# Patient Record
Sex: FEMALE | Race: WHITE | NOT HISPANIC OR LATINO | Employment: FULL TIME | URBAN - METROPOLITAN AREA
[De-identification: names, ages, dates, MRNs, and addresses within clinical notes are randomized per-mention and may not be internally consistent; named-entity substitution may affect disease eponyms.]

---

## 2017-03-14 ENCOUNTER — HOSPITAL ENCOUNTER (OUTPATIENT)
Facility: MEDICAL CENTER | Age: 62
End: 2017-03-14
Attending: FAMILY MEDICINE
Payer: COMMERCIAL

## 2017-03-14 ENCOUNTER — OFFICE VISIT (OUTPATIENT)
Dept: MEDICAL GROUP | Facility: PHYSICIAN GROUP | Age: 62
End: 2017-03-14
Payer: COMMERCIAL

## 2017-03-14 ENCOUNTER — HOSPITAL ENCOUNTER (OUTPATIENT)
Dept: RADIOLOGY | Facility: MEDICAL CENTER | Age: 62
End: 2017-03-14
Attending: FAMILY MEDICINE
Payer: COMMERCIAL

## 2017-03-14 ENCOUNTER — TELEPHONE (OUTPATIENT)
Dept: MEDICAL GROUP | Facility: PHYSICIAN GROUP | Age: 62
End: 2017-03-14

## 2017-03-14 VITALS
HEIGHT: 67 IN | BODY MASS INDEX: 33.12 KG/M2 | SYSTOLIC BLOOD PRESSURE: 160 MMHG | WEIGHT: 211 LBS | TEMPERATURE: 97.9 F | HEART RATE: 74 BPM | OXYGEN SATURATION: 98 % | DIASTOLIC BLOOD PRESSURE: 90 MMHG

## 2017-03-14 DIAGNOSIS — N30.01 ACUTE CYSTITIS WITH HEMATURIA: ICD-10-CM

## 2017-03-14 DIAGNOSIS — R30.0 BURNING WITH URINATION: ICD-10-CM

## 2017-03-14 DIAGNOSIS — R10.9 LEFT FLANK PAIN: ICD-10-CM

## 2017-03-14 PROBLEM — R19.7 ACUTE DIARRHEA: Status: ACTIVE | Noted: 2017-03-14

## 2017-03-14 PROBLEM — R19.7 ACUTE DIARRHEA: Status: RESOLVED | Noted: 2017-03-14 | Resolved: 2017-03-14

## 2017-03-14 PROCEDURE — 74176 CT ABD & PELVIS W/O CONTRAST: CPT

## 2017-03-14 PROCEDURE — 87186 SC STD MICRODIL/AGAR DIL: CPT

## 2017-03-14 PROCEDURE — 99214 OFFICE O/P EST MOD 30 MIN: CPT | Performed by: FAMILY MEDICINE

## 2017-03-14 PROCEDURE — 87086 URINE CULTURE/COLONY COUNT: CPT

## 2017-03-14 PROCEDURE — 87077 CULTURE AEROBIC IDENTIFY: CPT

## 2017-03-14 RX ORDER — CIPROFLOXACIN 500 MG/1
500 TABLET, FILM COATED ORAL 2 TIMES DAILY
Qty: 20 TAB | Refills: 0 | Status: SHIPPED | OUTPATIENT
Start: 2017-03-14 | End: 2017-03-16 | Stop reason: SINTOL

## 2017-03-14 NOTE — TELEPHONE ENCOUNTER
----- Message from Anca Barboza D.O. sent at 3/14/2017 10:49 AM PDT -----  Please call pt: CT was negative for a kidney stone.  Please take current antibiotic until it is completed.  If you have any further pain, please let me know.    Anca Barboza D.O.    #please note I have added a urine culture

## 2017-03-14 NOTE — MR AVS SNAPSHOT
"        Brenda Davidson   3/14/2017 8:20 AM   Office Visit   MRN: [id number]    Department:  [facility]   Dept Phone:  [phone]    Description:  Female : 1955   Provider:  Anca Barboza D.O.           Reason for Visit     Dysuria x2weeks    Diarrhea x2weeks      Allergies as of 3/14/2017     Allergen Noted Reactions    Codeine 2009   Rash, Itching    Vicodin doesn't cause her any problems    Ace Inhibitors 2013       cough      You were diagnosed with     Acute cystitis with hematuria   [115368]   Uncontrolled. Prescription for Cipro 500 mg one tab by mouth twice a day ×10 days. Monitor for tolerance and effect.    Burning with urination   [675534]   Uncontrolled; symptoms secondary to problem #1.    Left flank pain   [701920]   Uncontrolled. Given type of pain and associated urinary symptoms concerning for renal stones; sent for CT evaluation. Monitor for results      Vital Signs     Blood Pressure Pulse Temperature Height Weight Body Mass Index    160/90 mmHg 74 36.6 °C (97.9 °F) 1.702 m (5' 7\") 95.709 kg (211 lb) 33.04 kg/m2    Oxygen Saturation Smoking Status                98% Never Smoker           Basic Information     Date Of Birth Sex Race Ethnicity Preferred Language    1955 Female White Non- English      Your appointments     Mar 14, 2017 11:00 AM   CT BODY WO 30 with VISTA CT 1   [facility] [employer] ([employer])    [address] [place] [address] [phone]           Taking medications as regularly scheduled is strongly encouraged.            2017  4:00 PM   Established Patient with Anca Barboza D.O.   [facility] ([employer])    [address] [place] [address] [phone]           You will be receiving a confirmation call a few days before your appointment from our automated call confirmation system.              Problem List              ICD-10-CM Priority Class Noted - Resolved    Chronic low back pain M54.5, G89.29   " Unknown - Present    Type II or unspecified type diabetes mellitus without mention of complication, uncontrolled E11.65   10/27/2014 - Present    Dyslipidemia E78.5   6/22/2015 - Present    Malignant neoplasm of cecum (CMS-HCC) C18.0   8/21/2015 - Present    Right foot pain M79.671   3/1/2016 - Present    Type 2 diabetes mellitus without complication (CMS-HCC) E11.9   3/1/2016 - Present    Essential hypertension I10   3/1/2016 - Present    Burning with urination R30.0   12/2/2016 - Present    Left flank pain R10.9   3/14/2017 - Present      Health Maintenance        Date Due Completion Dates    DIABETES MONOFILAMENT / LE EXAM 1955 ---    IMM DTaP/Tdap/Td Vaccine (1 - Tdap) 5/14/1973 ---    IMM ZOSTER VACCINE 5/14/2014 ---    RETINAL SCREENING 10/13/2015 10/13/2014 (Done)    Override on 10/13/2014: Done    IMM PNEUMOCOCCAL 19-64 (ADULT) MEDIUM RISK SERIES (1 of 1 - PPSV23) 10/19/2015 8/24/2015    PAP SMEAR 2/4/2016 2/4/2013 (Done)    Override on 2/4/2013: Done    URINE ACR / MICROALBUMIN 3/10/2016 3/10/2015, 11/23/2013    MAMMOGRAM 6/29/2016 6/29/2015, 3/16/2012, 11/20/2008, 11/20/2008    IMM INFLUENZA (1) 9/1/2016 ---    A1C SCREENING 10/12/2016 4/12/2016, 6/22/2015, 3/10/2015, 10/25/2014, 11/23/2013    FASTING LIPID PROFILE 4/12/2017 4/12/2016, 6/22/2015, 3/10/2015, 10/25/2014, 11/23/2013    SERUM CREATININE 4/12/2017 4/12/2016, 8/25/2015, 8/23/2015, 8/22/2015, 8/18/2015, 6/22/2015, 3/10/2015, 10/25/2014, 11/23/2013, 4/6/2012    COLONOSCOPY 8/13/2025 8/13/2015            Current Immunizations     13-VALENT PCV PREVNAR 8/24/2015  9:36 AM      Below and/or attached are the medications your provider expects you to take. Review all of your home medications and newly ordered medications with your provider and/or pharmacist. Follow medication instructions as directed by your provider and/or pharmacist. Please keep your medication list with you and share with your provider. Update the information when medications  are discontinued, doses are changed, or new medications (including over-the-counter products) are added; and carry medication information at all times in the event of emergency situations     Allergies:  CODEINE - Rash,Itching     ACE INHIBITORS - (reactions not documented)               Medications  Valid as of: March 14, 2017 -  9:06 AM    Generic Name Brand Name Tablet Size Instructions for use    Atorvastatin Calcium (Tab) LIPITOR 40 MG Take 1 Tab by mouth every evening.        Blood Glucose Monitoring Suppl (Misc) Blood Glucose Monitoring Suppl SUPPLIES Test strips order: Test strips for Accu Check Compact meter. Sig: use once daily before breakfast and prn ssx high or low sugar #150 RF x 3        Ciprofloxacin HCl (Tab) CIPRO 500 MG Take 1 Tab by mouth 2 times a day.        DiltiaZEM HCl Coated Beads (CAPSULE SR 24 HR) CARTIA  MG TAKE ONE CAPSULE BY MOUTH DAILY        DiltiaZEM HCl Coated Beads (CAPSULE SR 24 HR) CARDIZEM  MG Take 1 Cap by mouth every day.        GlyBURIDE (Tab) DIABETA 5 MG TAKE ONE TABLET BY MOUTH EVERY MORNING WITH BREAKFAST(GENERIC DIABETA)        Ibuprofen (Tab) MOTRIN 800 MG Take 1 Tab by mouth every 8 hours as needed.        Losartan Potassium (Tab) COZAAR 100 MG Take 1 Tab by mouth every day.        MetFORMIN HCl (Tab) GLUCOPHAGE 1000 MG Take 1 Tab by mouth 2 times a day, with meals.        .                 Medicines prescribed today were sent to:     [facility][id number] - [place] - [address] [place] [address]    Phone: [phone] Fax: [phone]    Open 24 Hours?: No      Medication refill instructions:       If your prescription bottle indicates you have medication refills left, it is not necessary to call your provider’s office. Please contact your pharmacy and they will refill your medication.    If your prescription bottle indicates you do not have any refills left, you may request refills at any time through one of the following ways: The  online [employer] system (except Urgent Care), by calling your provider’s office, or by asking your pharmacy to contact your provider’s office with a refill request. Medication refills are processed only during regular business hours and may not be available until the next business day. Your provider may request additional information or to have a follow-up visit with you prior to refilling your medication.   *Please Note: Medication refills are assigned a new Rx number when refilled electronically. Your pharmacy may indicate that no refills were authorized even though a new prescription for the same medication is available at the pharmacy. Please request the medicine by name with the pharmacy before contacting your provider for a refill.        Your To Do List     Future Labs/Procedures Complete By Expires    CT-RENAL COLIC EVALUATION(A/P W/O)  As directed 3/14/2018         [employer] Access Code: Activation code not generated  Current [employer] Status: Active

## 2017-03-14 NOTE — ASSESSMENT & PLAN NOTE
New problem. Patient is reporting left flank pain is from going on for about 2 weeks. She states that she did have intermittent pain prior to that for the last 3 months but over the last 2 weeks it is now become persistent. She has pain that is in the upper outer part of the left flank, wraps around the iliac crest and shoots down into her groin. Patient reports that this pain is persistent throughout the day; is present at night to the point she cannot lie down on her left side. Along with that she's had other urinary symptoms including burning with urination. She reports that she has had some fevers and chills but no nausea and vomiting.

## 2017-03-14 NOTE — ASSESSMENT & PLAN NOTE
New problem. Patient is here today reporting burning with urination been present for approximately 2 weeks. Patient states it occurs every time that she urinates. She denies any blood in the urine. She reports that she has had some nausea but no vomiting; occasional fevers and chills. This is been associated with left flank pain.

## 2017-03-14 NOTE — PROGRESS NOTES
Subjective:   Brenda Davidson is a 61 y.o. female here today for flank pain and burning with urination    Left flank pain  New problem. Patient is reporting left flank pain is from going on for about 2 weeks. She states that she did have intermittent pain prior to that for the last 3 months but over the last 2 weeks it is now become persistent. She has pain that is in the upper outer part of the left flank, wraps around the iliac crest and shoots down into her groin. Patient reports that this pain is persistent throughout the day; is present at night to the point she cannot lie down on her left side. Along with that she's had other urinary symptoms including burning with urination. She reports that she has had some fevers and chills but no nausea and vomiting.    Burning with urination  New problem. Patient is here today reporting burning with urination been present for approximately 2 weeks. Patient states it occurs every time that she urinates. She denies any blood in the urine. She reports that she has had some nausea but no vomiting; occasional fevers and chills. This is been associated with left flank pain.           Current medicines (including changes today)  Current Outpatient Prescriptions   Medication Sig Dispense Refill   • ciprofloxacin (CIPRO) 500 MG Tab Take 1 Tab by mouth 2 times a day. 20 Tab 0   • metformin (GLUCOPHAGE) 1000 MG tablet Take 1 Tab by mouth 2 times a day, with meals. 180 Tab 3   • losartan (COZAAR) 100 MG Tab Take 1 Tab by mouth every day. 90 Tab 3   • glyBURIDE (DIABETA) 5 MG Tab TAKE ONE TABLET BY MOUTH EVERY MORNING WITH BREAKFAST(GENERIC DIABETA) 90 Tab 3   • atorvastatin (LIPITOR) 40 MG Tab Take 1 Tab by mouth every evening. 90 Tab 3   • diltiazem CD (CARTIA XT) 180 MG CAPSULE SR 24 HR Take 1 Cap by mouth every day. 90 Cap 3   • CARTIA  MG CAPSULE SR 24 HR TAKE ONE CAPSULE BY MOUTH DAILY 90 Cap 0   • ibuprofen (MOTRIN) 800 MG Tab Take 1 Tab by mouth every 8 hours as  "needed. 30 Tab 1   • Blood Glucose Monitoring Suppl SUPPLIES MISC Test strips order: Test strips for Accu Check Compact meter. Sig: use once daily before breakfast and prn ssx high or low sugar #150 RF x 3 150 Strip 5     No current facility-administered medications for this visit.     She  has a past medical history of Hypercholesteremia; Hypertension; Cold (3/29/12); Chronic low back pain; Other specified disorder of intestines; Dental disorder; Anesthesia; DM (diabetes mellitus) (CMS-HCC); and Mass of cecum (9/15). She also has no past medical history of Breast cancer (CMS-HCC).    ROS   No chest pain, no shortness of breath, no abdominal pain  +flank pain and burning with urination     Objective:     Blood pressure 160/90, pulse 74, temperature 36.6 °C (97.9 °F), height 1.702 m (5' 7\"), weight 95.709 kg (211 lb), SpO2 98 %. Body mass index is 33.04 kg/(m^2).   Physical Exam:  Alert, oriented in no acute distress.  Eye contact is good, speech goal directed, affect calm  HEENT: conjunctiva non-injected, sclera non-icteric.  Pinna normal. TM pearly gray.   Oral mucous membranes pink and moist with no lesions.  Neck No adenopathy or masses in the neck or supraclavicular regions.  Lungs: clear to auscultation bilaterally with good excursion.  CV: regular rate and rhythm.  Abdomen: soft; left flank pain on palpation with radiation over the iliac crest; suprapubic pain  Ext: no edema, color normal, vascularity normal, temperature normal        Assessment and Plan:   The following treatment plan was discussed     1. Acute cystitis with hematuria      Uncontrolled. Prescription for Cipro 500 mg one tab by mouth twice a day ×10 days. Monitor for tolerance and effect.   2. Burning with urination      Uncontrolled; symptoms secondary to problem #1.   3. Left flank pain  CT-RENAL COLIC EVALUATION(A/P W/O)    Uncontrolled. Given type of pain and associated urinary symptoms concerning for renal stones; sent for CT evaluation. " Monitor for results       Followup: Return if symptoms worsen or fail to improve.

## 2017-03-16 ENCOUNTER — TELEPHONE (OUTPATIENT)
Dept: MEDICAL GROUP | Facility: PHYSICIAN GROUP | Age: 62
End: 2017-03-16

## 2017-03-16 LAB
BACTERIA UR CULT: ABNORMAL
SIGNIFICANT IND 70042: ABNORMAL
SITE SITE: ABNORMAL
SOURCE SOURCE: ABNORMAL

## 2017-03-16 RX ORDER — LEVOFLOXACIN 250 MG/1
250 TABLET, FILM COATED ORAL DAILY
Qty: 5 TAB | Refills: 0 | Status: SHIPPED | OUTPATIENT
Start: 2017-03-16 | End: 2017-03-21

## 2017-03-16 NOTE — TELEPHONE ENCOUNTER
----- Message from Anca Barboza D.O. sent at 3/16/2017 12:46 PM PDT -----  Please call the pt: the urine culture was positive for infection.  Please ask if she is doing better on the antibiotic.    Anca Barboza D.O.

## 2017-03-16 NOTE — TELEPHONE ENCOUNTER
Pt said that this medication has started to make her vomit after she's taking it. I advised her to stop taking the medication and we will send in a new one.

## 2017-05-25 ENCOUNTER — OFFICE VISIT (OUTPATIENT)
Dept: MEDICAL GROUP | Facility: PHYSICIAN GROUP | Age: 62
End: 2017-05-25
Payer: COMMERCIAL

## 2017-05-25 VITALS
SYSTOLIC BLOOD PRESSURE: 136 MMHG | BODY MASS INDEX: 32.96 KG/M2 | DIASTOLIC BLOOD PRESSURE: 78 MMHG | HEART RATE: 87 BPM | TEMPERATURE: 98 F | HEIGHT: 67 IN | WEIGHT: 210 LBS | OXYGEN SATURATION: 94 %

## 2017-05-25 DIAGNOSIS — K57.32 DIVERTICULITIS OF LARGE INTESTINE WITHOUT PERFORATION OR ABSCESS WITHOUT BLEEDING: ICD-10-CM

## 2017-05-25 DIAGNOSIS — R19.7 DIARRHEA OF PRESUMED INFECTIOUS ORIGIN: ICD-10-CM

## 2017-05-25 PROCEDURE — 99214 OFFICE O/P EST MOD 30 MIN: CPT | Performed by: FAMILY MEDICINE

## 2017-05-25 RX ORDER — METRONIDAZOLE 500 MG/1
500 TABLET ORAL 2 TIMES DAILY
Qty: 20 TAB | Refills: 0 | Status: SHIPPED | OUTPATIENT
Start: 2017-05-25 | End: 2017-06-04

## 2017-05-25 ASSESSMENT — PATIENT HEALTH QUESTIONNAIRE - PHQ9: CLINICAL INTERPRETATION OF PHQ2 SCORE: 0

## 2017-05-25 NOTE — ASSESSMENT & PLAN NOTE
New problem: multiple episodes of diarrhea that occur everyday for the past 2 months; has had associated LLQ pain. Patient reports that she has also had some fevers and chills on occasion. She denies any blood in the stool; she denies any significant foul odor associated with the stool. She reports that she has had some associated nausea but no vomiting and is still able to eat and drink without issue. She states that she had a colonoscopy 2 months ago which did show diverticulosis but no other issues.

## 2017-05-25 NOTE — MR AVS SNAPSHOT
"        Brenda Davidson   2017 3:40 PM   Office Visit   MRN: [id number]    Department:  [facility]   Dept Phone:  [phone]    Description:  Female : 1955   Provider:  Anca Barboza D.O.           Reason for Visit     LLQ Pain i3mybdxy. frequent BM, hair loss      Allergies as of 2017     Allergen Noted Reactions    Codeine 2009   Rash, Itching    Vicodin doesn't cause her any problems    Ace Inhibitors 2013       cough      You were diagnosed with     Diverticulitis of large intestine without perforation or abscess without bleeding   [335164]   Uncontrolled. Prescription for Flagyl 500 mg one tab by mouth twice a day ×10 days. Monitor for tolerance and effect    Diarrhea of presumed infectious origin   [009.3.ICD-9-CM]   Uncontrolled; could be symptoms secondary to problem #1. Given longevity will also send for stool studies for further evaluation; monitor for results      Vital Signs     Blood Pressure Pulse Temperature Height Weight Body Mass Index    136/78 mmHg 87 36.7 °C (98 °F) 1.702 m (5' 7\") 95.255 kg (210 lb) 32.88 kg/m2    Oxygen Saturation Smoking Status                94% Never Smoker           Basic Information     Date Of Birth Sex Race Ethnicity Preferred Language    1955 Female White Non- English      Your appointments     2017  1:00 PM   NEW TO YOU with Kiah Perdue M.D.   [facility] [place] (--)    [address]  [address]  [facility] [address] [phone]              Problem List              ICD-10-CM Priority Class Noted - Resolved    Chronic low back pain M54.5, G89.29   Unknown - Present    Type II or unspecified type diabetes mellitus without mention of complication, uncontrolled E11.65   10/27/2014 - Present    Dyslipidemia E78.5   2015 - Present    Malignant neoplasm of cecum (CMS-HCC) C18.0   2015 - Present    Right foot pain M79.671   3/1/2016 - Present    Type 2 diabetes mellitus without " complication (CMS-HCC) E11.9   3/1/2016 - Present    Essential hypertension I10   3/1/2016 - Present    Burning with urination R30.0   12/2/2016 - Present    Left flank pain R10.9   3/14/2017 - Present    Diarrhea of presumed infectious origin A09   5/25/2017 - Present      Health Maintenance        Date Due Completion Dates    DIABETES MONOFILAMENT / LE EXAM 1955 ---    IMM DTaP/Tdap/Td Vaccine (1 - Tdap) 5/14/1973 ---    IMM ZOSTER VACCINE 5/14/2014 ---    RETINAL SCREENING 10/13/2015 10/13/2014 (Done)    Override on 10/13/2014: Done    IMM PNEUMOCOCCAL 19-64 (ADULT) MEDIUM RISK SERIES (1 of 1 - PPSV23) 10/19/2015 8/24/2015    PAP SMEAR 2/4/2016 2/4/2013 (Done)    Override on 2/4/2013: Done    URINE ACR / MICROALBUMIN 3/10/2016 3/10/2015, 11/23/2013    MAMMOGRAM 6/29/2016 6/29/2015, 3/16/2012, 11/20/2008, 11/20/2008    A1C SCREENING 10/12/2016 4/12/2016, 6/22/2015, 3/10/2015, 10/25/2014, 11/23/2013    FASTING LIPID PROFILE 4/12/2017 4/12/2016, 6/22/2015, 3/10/2015, 10/25/2014, 11/23/2013    SERUM CREATININE 4/12/2017 4/12/2016, 8/25/2015, 8/23/2015, 8/22/2015, 8/18/2015, 6/22/2015, 3/10/2015, 10/25/2014, 11/23/2013, 4/6/2012    COLONOSCOPY 8/13/2025 8/13/2015            Current Immunizations     13-VALENT PCV PREVNAR 8/24/2015  9:36 AM      Below and/or attached are the medications your provider expects you to take. Review all of your home medications and newly ordered medications with your provider and/or pharmacist. Follow medication instructions as directed by your provider and/or pharmacist. Please keep your medication list with you and share with your provider. Update the information when medications are discontinued, doses are changed, or new medications (including over-the-counter products) are added; and carry medication information at all times in the event of emergency situations     Allergies:  CODEINE - Rash,Itching     ACE INHIBITORS - (reactions not documented)               Medications  Valid  as of: May 25, 2017 -  4:04 PM    Generic Name Brand Name Tablet Size Instructions for use    Atorvastatin Calcium (Tab) LIPITOR 40 MG Take 1 Tab by mouth every evening.        Blood Glucose Monitoring Suppl (Misc) Blood Glucose Monitoring Suppl SUPPLIES Test strips order: Test strips for Accu Check Compact meter. Sig: use once daily before breakfast and prn ssx high or low sugar #150 RF x 3        DilTIAZem HCl Coated Beads (CAPSULE SR 24 HR) CARTIA  MG TAKE ONE CAPSULE BY MOUTH DAILY        DilTIAZem HCl Coated Beads (CAPSULE SR 24 HR) CARDIZEM  MG Take 1 Cap by mouth every day.        GlyBURIDE (Tab) DIABETA 5 MG TAKE ONE TABLET BY MOUTH EVERY MORNING WITH BREAKFAST(GENERIC DIABETA)        Ibuprofen (Tab) MOTRIN 800 MG Take 1 Tab by mouth every 8 hours as needed.        Losartan Potassium (Tab) COZAAR 100 MG Take 1 Tab by mouth every day.        MetFORMIN HCl (Tab) GLUCOPHAGE 1000 MG Take 1 Tab by mouth 2 times a day, with meals.        MetroNIDAZOLE (Tab) FLAGYL 500 MG Take 1 Tab by mouth 2 Times a Day for 10 days.        .                 Medicines prescribed today were sent to:     [facility][id number] - [place] [address]    Phone: [phone] Fax: [phone]    Open 24 Hours?: No      Medication refill instructions:       If your prescription bottle indicates you have medication refills left, it is not necessary to call your provider’s office. Please contact your pharmacy and they will refill your medication.    If your prescription bottle indicates you do not have any refills left, you may request refills at any time through one of the following ways: The online [employer] system (except Urgent Care), by calling your provider’s office, or by asking your pharmacy to contact your provider’s office with a refill request. Medication refills are processed only during regular business hours and may not be available until the next business day. Your provider may  request additional information or to have a follow-up visit with you prior to refilling your medication.   *Please Note: Medication refills are assigned a new Rx number when refilled electronically. Your pharmacy may indicate that no refills were authorized even though a new prescription for the same medication is available at the pharmacy. Please request the medicine by name with the pharmacy before contacting your provider for a refill.        Your To Do List     Future Labs/Procedures Complete By Expires    CDIFF BY PCR  As directed 5/26/2018    COMPLETE O&P  As directed 5/26/2018    CULTURE STOOL  As directed 5/26/2018    STOOL WBC'S  As directed 5/26/2018         MyChart Access Code: Activation code not generated  Current [employer] Status: Active

## 2017-05-25 NOTE — PROGRESS NOTES
Subjective:   Brenda Davidson is a 62 y.o. female here today for diarrhea    Diarrhea of presumed infectious origin  New problem: multiple episodes of diarrhea that occur everyday for the past 2 months; has had associated LLQ pain. Patient reports that she has also had some fevers and chills on occasion. She denies any blood in the stool; she denies any significant foul odor associated with the stool. She reports that she has had some associated nausea but no vomiting and is still able to eat and drink without issue. She states that she had a colonoscopy 2 months ago which did show diverticulosis but no other issues.         Current medicines (including changes today)  Current Outpatient Prescriptions   Medication Sig Dispense Refill   • metronidazole (FLAGYL) 500 MG Tab Take 1 Tab by mouth 2 Times a Day for 10 days. 20 Tab 0   • metformin (GLUCOPHAGE) 1000 MG tablet Take 1 Tab by mouth 2 times a day, with meals. 180 Tab 3   • losartan (COZAAR) 100 MG Tab Take 1 Tab by mouth every day. 90 Tab 3   • glyBURIDE (DIABETA) 5 MG Tab TAKE ONE TABLET BY MOUTH EVERY MORNING WITH BREAKFAST(GENERIC DIABETA) 90 Tab 3   • atorvastatin (LIPITOR) 40 MG Tab Take 1 Tab by mouth every evening. 90 Tab 3   • diltiazem CD (CARTIA XT) 180 MG CAPSULE SR 24 HR Take 1 Cap by mouth every day. 90 Cap 3   • CARTIA  MG CAPSULE SR 24 HR TAKE ONE CAPSULE BY MOUTH DAILY 90 Cap 0   • ibuprofen (MOTRIN) 800 MG Tab Take 1 Tab by mouth every 8 hours as needed. 30 Tab 1   • Blood Glucose Monitoring Suppl SUPPLIES MISC Test strips order: Test strips for Accu Check Compact meter. Sig: use once daily before breakfast and prn ssx high or low sugar #150 RF x 3 150 Strip 5     No current facility-administered medications for this visit.     She  has a past medical history of Hypercholesteremia; Hypertension; Cold (3/29/12); Chronic low back pain; Other specified disorder of intestines; Dental disorder; Anesthesia; DM (diabetes mellitus) (CMS-[facility]);  "and Mass of cecum (9/15). She also has no past medical history of Breast cancer (CMS-HCC).    ROS   No chest pain, no shortness of breath  +diarrhea     Objective:     Blood pressure 136/78, pulse 87, temperature 36.7 °C (98 °F), height 1.702 m (5' 7\"), weight 95.255 kg (210 lb), SpO2 94 %. Body mass index is 32.88 kg/(m^2).   Physical Exam:  Alert, oriented in no acute distress.  Eye contact is good, speech goal directed, affect calm  HEENT: conjunctiva non-injected, sclera non-icteric.  Pinna normal. Oral mucous membranes pink and moist with no lesions.  Neck No adenopathy or masses in the neck or supraclavicular regions.  Lungs: clear to auscultation bilaterally with good excursion.  CV: regular rate and rhythm.  Abdomen: soft, mild TTP along LLQ; no guarding  Ext: no edema, color normal, vascularity normal, temperature normal        Assessment and Plan:   The following treatment plan was discussed     1. Diverticulitis of large intestine without perforation or abscess without bleeding      Uncontrolled. Prescription for Flagyl 500 mg one tab by mouth twice a day ×10 days. Monitor for tolerance and effect   2. Diarrhea of presumed infectious origin  STOOL WBC'S    CDIFF BY PCR    COMPLETE O&P    CULTURE STOOL    Uncontrolled; could be symptoms secondary to problem #1. Given longevity will also send for stool studies for further evaluation; monitor for results       Followup: Return if symptoms worsen or fail to improve.            "

## 2017-05-30 ENCOUNTER — TELEPHONE (OUTPATIENT)
Dept: MEDICAL GROUP | Facility: PHYSICIAN GROUP | Age: 62
End: 2017-05-30

## 2017-05-30 RX ORDER — ONDANSETRON 4 MG/1
4 TABLET, FILM COATED ORAL EVERY 4 HOURS PRN
Qty: 60 TAB | Refills: 0 | Status: SHIPPED | OUTPATIENT
Start: 2017-05-30 | End: 2017-09-22

## 2017-05-30 NOTE — TELEPHONE ENCOUNTER
There is no other good option to treat this condition.  She should take with food and I will send in antinausea medication to help.    Anca Barboza D.O.

## 2017-05-30 NOTE — TELEPHONE ENCOUNTER
1. Caller Name: Brenda Davidson                                           Call Back Number: [phone] (home)         Patient approves a detailed voicemail message: N\A    Pt called stating the flagyl makes her nauseas and she would like a new antibiotic

## 2017-06-10 ENCOUNTER — HOSPITAL ENCOUNTER (OUTPATIENT)
Facility: MEDICAL CENTER | Age: 62
End: 2017-06-10
Attending: FAMILY MEDICINE
Payer: COMMERCIAL

## 2017-06-10 DIAGNOSIS — R19.7 DIARRHEA OF PRESUMED INFECTIOUS ORIGIN: ICD-10-CM

## 2017-06-10 LAB
C DIFF DNA SPEC QL NAA+PROBE: NEGATIVE
C DIFF TOX A+B STL QL IA: NEGATIVE
C DIFF TOX GENS STL QL NAA+PROBE: ABNORMAL
G LAMBLIA+C PARVUM AG STL QL RAPID: NORMAL
SIGNIFICANT IND 70042: NORMAL
SITE SITE: NORMAL
SOURCE SOURCE: NORMAL
WBC STL QL MICRO: NORMAL

## 2017-06-10 PROCEDURE — 87329 GIARDIA AG IA: CPT

## 2017-06-10 PROCEDURE — 87324 CLOSTRIDIUM AG IA: CPT

## 2017-06-10 PROCEDURE — 87328 CRYPTOSPORIDIUM AG IA: CPT

## 2017-06-10 PROCEDURE — 87899 AGENT NOS ASSAY W/OPTIC: CPT

## 2017-06-10 PROCEDURE — 87493 C DIFF AMPLIFIED PROBE: CPT

## 2017-06-10 PROCEDURE — 87045 FECES CULTURE AEROBIC BACT: CPT

## 2017-06-10 PROCEDURE — 89055 LEUKOCYTE ASSESSMENT FECAL: CPT

## 2017-06-10 PROCEDURE — 87046 STOOL CULTR AEROBIC BACT EA: CPT

## 2017-06-11 DIAGNOSIS — A04.72 C. DIFFICILE DIARRHEA: ICD-10-CM

## 2017-06-11 LAB
E COLI SXT1+2 STL IA: NORMAL
SIGNIFICANT IND 70042: NORMAL
SITE SITE: NORMAL
SOURCE SOURCE: NORMAL

## 2017-06-11 RX ORDER — METRONIDAZOLE 500 MG/1
500 TABLET ORAL 3 TIMES DAILY
Qty: 42 TAB | Refills: 0 | Status: SHIPPED | OUTPATIENT
Start: 2017-06-11 | End: 2017-07-18

## 2017-06-12 ENCOUNTER — TELEPHONE (OUTPATIENT)
Dept: MEDICAL GROUP | Facility: PHYSICIAN GROUP | Age: 62
End: 2017-06-12

## 2017-06-12 NOTE — TELEPHONE ENCOUNTER
Serenity at the lab called me with positive c diff resulta for Brenda    POS-SeeToxn (A)    Please call patient

## 2017-06-12 NOTE — TELEPHONE ENCOUNTER
----- Message from FILI Montes sent at 6/11/2017  4:07 PM PDT -----  Please advise patient that her stool studies came back positive for an infection called C. Diff. I realize she was previously prescribed Flagyl for diverticulitis and had some stomach upset with it. Please clarify if she took this whole 10 days or not.     I have sent in a new prescription for Flagyl 500 mg every 8 hours for 14 days rather than 2 times daily. I see she already got plenty of anti-nausea medication.     She will need a follow up stool study to confirm resolution 1 week after completing antibiotics. This order has been placed already.    FILI Montes  Covering for Dr. Barboza

## 2017-06-13 LAB
BACTERIA STL CULT: NORMAL
E COLI SXT1+2 STL IA: NORMAL
SIGNIFICANT IND 70042: NORMAL
SITE SITE: NORMAL
SOURCE SOURCE: NORMAL

## 2017-07-05 ENCOUNTER — TELEPHONE (OUTPATIENT)
Dept: MEDICAL GROUP | Facility: PHYSICIAN GROUP | Age: 62
End: 2017-07-05

## 2017-07-05 NOTE — TELEPHONE ENCOUNTER
There is an order already placed by Keyon Chauhan in chart.She needs to come to the lab and  the collection kit.    Rivera Zamorano M.D.  Covering for

## 2017-07-05 NOTE — TELEPHONE ENCOUNTER
1. Caller Name: Brenda Davidson                                           Call Back Number: [phone] (home)         Patient approves a detailed voicemail message: N\A    Pt called asking if she can just get a stool kit for her cdiff recheck instead of coming in for an appt on Friday

## 2017-07-07 ENCOUNTER — APPOINTMENT (OUTPATIENT)
Dept: MEDICAL GROUP | Facility: PHYSICIAN GROUP | Age: 62
End: 2017-07-07
Payer: COMMERCIAL

## 2017-07-15 ENCOUNTER — HOSPITAL ENCOUNTER (OUTPATIENT)
Facility: MEDICAL CENTER | Age: 62
End: 2017-07-15
Attending: NURSE PRACTITIONER
Payer: COMMERCIAL

## 2017-07-15 DIAGNOSIS — A04.72 C. DIFFICILE DIARRHEA: ICD-10-CM

## 2017-07-15 LAB
C DIFF DNA SPEC QL NAA+PROBE: NEGATIVE
C DIFF TOX A+B STL QL IA: NEGATIVE
C DIFF TOX GENS STL QL NAA+PROBE: ABNORMAL

## 2017-07-15 PROCEDURE — 87493 C DIFF AMPLIFIED PROBE: CPT

## 2017-07-15 PROCEDURE — 87324 CLOSTRIDIUM AG IA: CPT

## 2017-07-17 ENCOUNTER — TELEPHONE (OUTPATIENT)
Dept: MEDICAL GROUP | Facility: PHYSICIAN GROUP | Age: 62
End: 2017-07-17

## 2017-07-17 NOTE — TELEPHONE ENCOUNTER
1. Caller Name: Brenda Davidson                                         Call Back Number: [phone] (home)       Patient approves a detailed voicemail message: N\A    2. Patient is requesting lab results dated: 7/15/17    3. Confirmed results are in chart. Patient advised they will be contacted once interpreted by provider. Patient states she has been on antibiotic for 2 weeks

## 2017-07-18 ENCOUNTER — OFFICE VISIT (OUTPATIENT)
Dept: MEDICAL GROUP | Facility: PHYSICIAN GROUP | Age: 62
End: 2017-07-18
Payer: COMMERCIAL

## 2017-07-18 ENCOUNTER — HOSPITAL ENCOUNTER (OUTPATIENT)
Dept: LAB | Facility: MEDICAL CENTER | Age: 62
End: 2017-07-18
Attending: NURSE PRACTITIONER
Payer: COMMERCIAL

## 2017-07-18 VITALS
OXYGEN SATURATION: 97 % | DIASTOLIC BLOOD PRESSURE: 70 MMHG | HEIGHT: 67 IN | WEIGHT: 207 LBS | TEMPERATURE: 98.6 F | SYSTOLIC BLOOD PRESSURE: 138 MMHG | BODY MASS INDEX: 32.49 KG/M2 | HEART RATE: 77 BPM

## 2017-07-18 DIAGNOSIS — A04.72 C. DIFFICILE DIARRHEA: ICD-10-CM

## 2017-07-18 DIAGNOSIS — R10.32 LLQ ABDOMINAL PAIN: ICD-10-CM

## 2017-07-18 PROBLEM — R10.9 LEFT FLANK PAIN: Status: RESOLVED | Noted: 2017-03-14 | Resolved: 2017-07-18

## 2017-07-18 LAB
ALBUMIN SERPL BCP-MCNC: 4.3 G/DL (ref 3.2–4.9)
ALBUMIN/GLOB SERPL: 1.3 G/DL
ALP SERPL-CCNC: 48 U/L (ref 30–99)
ALT SERPL-CCNC: 57 U/L (ref 2–50)
ANION GAP SERPL CALC-SCNC: 12 MMOL/L (ref 0–11.9)
AST SERPL-CCNC: 38 U/L (ref 12–45)
BASOPHILS # BLD AUTO: 0.9 % (ref 0–1.8)
BASOPHILS # BLD: 0.06 K/UL (ref 0–0.12)
BILIRUB SERPL-MCNC: 0.4 MG/DL (ref 0.1–1.5)
BUN SERPL-MCNC: 25 MG/DL (ref 8–22)
CALCIUM SERPL-MCNC: 9.8 MG/DL (ref 8.5–10.5)
CHLORIDE SERPL-SCNC: 108 MMOL/L (ref 96–112)
CO2 SERPL-SCNC: 19 MMOL/L (ref 20–33)
CREAT SERPL-MCNC: 1.07 MG/DL (ref 0.5–1.4)
EOSINOPHIL # BLD AUTO: 0.14 K/UL (ref 0–0.51)
EOSINOPHIL NFR BLD: 2.1 % (ref 0–6.9)
ERYTHROCYTE [DISTWIDTH] IN BLOOD BY AUTOMATED COUNT: 48.7 FL (ref 35.9–50)
GFR SERPL CREATININE-BSD FRML MDRD: 52 ML/MIN/1.73 M 2
GLOBULIN SER CALC-MCNC: 3.3 G/DL (ref 1.9–3.5)
GLUCOSE SERPL-MCNC: 194 MG/DL (ref 65–99)
HCT VFR BLD AUTO: 39.3 % (ref 37–47)
HGB BLD-MCNC: 12.5 G/DL (ref 12–16)
IMM GRANULOCYTES # BLD AUTO: 0.01 K/UL (ref 0–0.11)
IMM GRANULOCYTES NFR BLD AUTO: 0.1 % (ref 0–0.9)
LYMPHOCYTES # BLD AUTO: 3.02 K/UL (ref 1–4.8)
LYMPHOCYTES NFR BLD: 44.6 % (ref 22–41)
MCH RBC QN AUTO: 28.7 PG (ref 27–33)
MCHC RBC AUTO-ENTMCNC: 31.8 G/DL (ref 33.6–35)
MCV RBC AUTO: 90.1 FL (ref 81.4–97.8)
MONOCYTES # BLD AUTO: 0.58 K/UL (ref 0–0.85)
MONOCYTES NFR BLD AUTO: 8.6 % (ref 0–13.4)
NEUTROPHILS # BLD AUTO: 2.96 K/UL (ref 2–7.15)
NEUTROPHILS NFR BLD: 43.7 % (ref 44–72)
NRBC # BLD AUTO: 0 K/UL
NRBC BLD AUTO-RTO: 0 /100 WBC
PLATELET # BLD AUTO: 301 K/UL (ref 164–446)
PMV BLD AUTO: 10.9 FL (ref 9–12.9)
POTASSIUM SERPL-SCNC: 3.7 MMOL/L (ref 3.6–5.5)
PROT SERPL-MCNC: 7.6 G/DL (ref 6–8.2)
RBC # BLD AUTO: 4.36 M/UL (ref 4.2–5.4)
SODIUM SERPL-SCNC: 139 MMOL/L (ref 135–145)
WBC # BLD AUTO: 6.8 K/UL (ref 4.8–10.8)

## 2017-07-18 PROCEDURE — 36415 COLL VENOUS BLD VENIPUNCTURE: CPT

## 2017-07-18 PROCEDURE — 80053 COMPREHEN METABOLIC PANEL: CPT

## 2017-07-18 PROCEDURE — 85025 COMPLETE CBC W/AUTO DIFF WBC: CPT

## 2017-07-18 PROCEDURE — 99213 OFFICE O/P EST LOW 20 MIN: CPT | Performed by: NURSE PRACTITIONER

## 2017-07-18 RX ORDER — VANCOMYCIN HYDROCHLORIDE 250 MG/1
250 CAPSULE ORAL 4 TIMES DAILY
Qty: 56 CAP | Refills: 0 | Status: SHIPPED | OUTPATIENT
Start: 2017-07-18 | End: 2017-09-22

## 2017-07-18 ASSESSMENT — PAIN SCALES - GENERAL: PAINLEVEL: NO PAIN

## 2017-07-18 NOTE — TELEPHONE ENCOUNTER
Please call patient to discuss the message I sent on [email or web address]:    I see that this stool sample still shows positive C. difficile. Are you having any further symptoms of fever, abdominal pain, diarrhea, or blood in stool? If you are still having symptoms, we will need to continue treating him with antibiotics. If your symptoms have resolved, we will need to repeat the stool sample once more in about 4 weeks as sometimes it can take up to 6 weeks for the stool sample to return to a negative result    JEANNE Montes.

## 2017-07-18 NOTE — ASSESSMENT & PLAN NOTE
5/25 dx with diverticulitis after colonoscopy earlier this year revealed diverticulosis and she presented with two months of diarrhea and LLQ pain. Then stool studies revealed +C. Diff on 6/10. She completed the full 2 weeks of Flagyl 500 mg TID after result returned positive. F/u stool study 3 weeks post completion still with positive C. Diff. She still has pain in LLQ, sometimes up to 10/10; almost brought herself to ER the other day. She has frequent diarrhea t/o the day. Pain is keeping her awake at night. She has been sleeping with fan on, but does not normally. She is not aware of fevers though.   Denies any malaise or fatigue, or weight loss. No blood in stool. Flagyl has made her nauseated, but no vomiting.

## 2017-07-18 NOTE — MR AVS SNAPSHOT
"Brenda Davidson   2017 2:40 PM   Office Visit   MRN: [id number]    Department:  [facility]   Dept Phone:  [phone]    Description:  Female : 1955   Provider:  FILI Montes           Reason for Visit     Follow-Up CDIFFresult Postive    Abdominal Pain LLQ into groin      Allergies as of 2017     Allergen Noted Reactions    Codeine 2009   Rash, Itching    Vicodin doesn't cause her any problems    Ace Inhibitors 2013       cough    Flagyl [Metronidazole] 2017   Nausea      You were diagnosed with     C. difficile diarrhea   [151493]       LLQ abdominal pain   [212095]         Vital Signs     Blood Pressure Pulse Temperature Height Weight Body Mass Index    138/70 mmHg 77 37 °C (98.6 °F) 1.702 m (5' 7.01\") 93.895 kg (207 lb) 32.41 kg/m2    Oxygen Saturation Breastfeeding? Smoking Status             97% No Never Smoker          Basic Information     Date Of Birth Sex Race Ethnicity Preferred Language    1955 Female White Non- English      Problem List              ICD-10-CM Priority Class Noted - Resolved    Chronic low back pain M54.5, G89.29   Unknown - Present    Type II or unspecified type diabetes mellitus without mention of complication, uncontrolled E11.65   10/27/2014 - Present    Dyslipidemia E78.5   2015 - Present    History of colon cancer Z85.038   2015 - Present    Right foot pain M79.671   3/1/2016 - Present    Type 2 diabetes mellitus without complication (CMS-HCC) E11.9   3/1/2016 - Present    Essential hypertension I10   3/1/2016 - Present    C. difficile diarrhea A04.7   2017 - Present      Health Maintenance        Date Due Completion Dates    DIABETES MONOFILAMENT / LE EXAM 1955 ---    IMM DTaP/Tdap/Td Vaccine (1 - Tdap) 1973 ---    IMM ZOSTER VACCINE 2014 ---    RETINAL SCREENING 10/13/2015 10/13/2014 (Done)    Override on 10/13/2014: Done    IMM PNEUMOCOCCAL 19-64 (ADULT) MEDIUM RISK SERIES " (1 of 1 - PPSV23) 10/19/2015 8/24/2015    PAP SMEAR 2/4/2016 2/4/2013 (Done)    Override on 2/4/2013: Done    URINE ACR / MICROALBUMIN 3/10/2016 3/10/2015, 11/23/2013    MAMMOGRAM 6/29/2016 6/29/2015, 3/16/2012, 11/20/2008, 11/20/2008    A1C SCREENING 10/12/2016 4/12/2016, 6/22/2015, 3/10/2015, 10/25/2014, 11/23/2013    FASTING LIPID PROFILE 4/12/2017 4/12/2016, 6/22/2015, 3/10/2015, 10/25/2014, 11/23/2013    SERUM CREATININE 4/12/2017 4/12/2016, 8/25/2015, 8/23/2015, 8/22/2015, 8/18/2015, 6/22/2015, 3/10/2015, 10/25/2014, 11/23/2013, 4/6/2012    IMM INFLUENZA (1) 9/1/2017 ---    COLONOSCOPY 8/13/2025 8/13/2015            Current Immunizations     13-VALENT PCV PREVNAR 8/24/2015  9:36 AM      Below and/or attached are the medications your provider expects you to take. Review all of your home medications and newly ordered medications with your provider and/or pharmacist. Follow medication instructions as directed by your provider and/or pharmacist. Please keep your medication list with you and share with your provider. Update the information when medications are discontinued, doses are changed, or new medications (including over-the-counter products) are added; and carry medication information at all times in the event of emergency situations     Allergies:  CODEINE - Rash,Itching     ACE INHIBITORS - (reactions not documented)     FLAGYL - Nausea               Medications  Valid as of: July 18, 2017 -  3:17 PM    Generic Name Brand Name Tablet Size Instructions for use    Atorvastatin Calcium (Tab) LIPITOR 40 MG Take 1 Tab by mouth every evening.        Blood Glucose Monitoring Suppl (Misc) Blood Glucose Monitoring Suppl SUPPLIES Test strips order: Test strips for Accu Check Compact meter. Sig: use once daily before breakfast and prn ssx high or low sugar #150 RF x 3        DilTIAZem HCl Coated Beads (CAPSULE SR 24 HR) CARTIA  MG TAKE ONE CAPSULE BY MOUTH DAILY        DilTIAZem HCl Coated Beads (CAPSULE SR 24  HR) CARDIZEM  MG Take 1 Cap by mouth every day.        GlyBURIDE (Tab) DIABETA 5 MG TAKE ONE TABLET BY MOUTH EVERY MORNING WITH BREAKFAST(GENERIC DIABETA)        Ibuprofen (Tab) MOTRIN 800 MG Take 1 Tab by mouth every 8 hours as needed.        Losartan Potassium (Tab) COZAAR 100 MG Take 1 Tab by mouth every day.        MetFORMIN HCl (Tab) GLUCOPHAGE 1000 MG Take 1 Tab by mouth 2 times a day, with meals.        Ondansetron HCl (Tab) ZOFRAN 4 MG Take 1 Tab by mouth every four hours as needed for Nausea/Vomiting.        Vancomycin HCl (Cap) VANCOCIN 250 MG Take 1 Cap by mouth 4 times a day.        .                 Medicines prescribed today were sent to:     \[facility]\"" [facility] #[id number] - [place] - [address]    [address] [place] [address]    Phone: [phone] Fax: [phone]    Open 24 Hours?: No      Medication refill instructions:       If your prescription bottle indicates you have medication refills left, it is not necessary to call your provider’s office. Please contact your pharmacy and they will refill your medication.    If your prescription bottle indicates you do not have any refills left, you may request refills at any time through one of the following ways: The online [employer] system (except Urgent Care), by calling your provider’s office, or by asking your pharmacy to contact your provider’s office with a refill request. Medication refills are processed only during regular business hours and may not be available until the next business day. Your provider may request additional information or to have a follow-up visit with you prior to refilling your medication.   *Please Note: Medication refills are assigned a new Rx number when refilled electronically. Your pharmacy may indicate that no refills were authorized even though a new prescription for the same medication is available at the pharmacy. Please request the medicine by name with the pharmacy before contacting your provider for a refill.           Your To Do List     Future Labs/Procedures Complete By Expires    CBC WITH DIFFERENTIAL  As directed 7/19/2018    COMP METABOLIC PANEL  As directed 7/19/2018         MyChart Access Code: Activation code not generated  Current MyChart Status: Active

## 2017-07-19 ENCOUNTER — HOSPITAL ENCOUNTER (OUTPATIENT)
Dept: RADIOLOGY | Facility: MEDICAL CENTER | Age: 62
End: 2017-07-19
Attending: NURSE PRACTITIONER
Payer: COMMERCIAL

## 2017-07-19 PROCEDURE — 74177 CT ABD & PELVIS W/CONTRAST: CPT

## 2017-07-19 PROCEDURE — 700117 HCHG RX CONTRAST REV CODE 255: Performed by: NURSE PRACTITIONER

## 2017-07-19 RX ADMIN — IOHEXOL 100 ML: 350 INJECTION, SOLUTION INTRAVENOUS at 15:56

## 2017-07-19 NOTE — PROGRESS NOTES
Subjective:     Chief Complaint   Patient presents with   • Follow-Up     CDIFFresult Postive   • Abdominal Pain     LLQ into groin       HPI  Brenda Davidson is a 62 y.o. female here today for continued LLE pain and C Diff    C. difficile diarrhea  5/25 dx with diverticulitis after colonoscopy earlier this year revealed diverticulosis and she presented with two months of diarrhea and LLQ pain. Then stool studies revealed +C. Diff on 6/10. She completed the full 2 weeks of Flagyl 500 mg TID after result returned positive. F/u stool study 3 weeks post completion still with positive C. Diff. She still has pain in LLQ, sometimes up to 10/10; almost brought herself to ER the other day. She has frequent diarrhea t/o the day. Pain is keeping her awake at night. She has been sleeping with fan on, but does not normally. She is not aware of fevers though.   Denies any malaise or fatigue, or weight loss. No blood in stool. Flagyl has made her nauseated, but no vomiting.          Diagnoses of C. difficile diarrhea and LLQ abdominal pain were pertinent to this visit.    Allergies: Codeine; Ace inhibitors; and Flagyl  Current medicines (including changes today)  Current Outpatient Prescriptions   Medication Sig Dispense Refill   • vancomycin (VANCOCIN) 250 MG capsule Take 1 Cap by mouth 4 times a day. 56 Cap 0   • ondansetron (ZOFRAN) 4 MG Tab tablet Take 1 Tab by mouth every four hours as needed for Nausea/Vomiting. 60 Tab 0   • metformin (GLUCOPHAGE) 1000 MG tablet Take 1 Tab by mouth 2 times a day, with meals. 180 Tab 3   • losartan (COZAAR) 100 MG Tab Take 1 Tab by mouth every day. 90 Tab 3   • glyBURIDE (DIABETA) 5 MG Tab TAKE ONE TABLET BY MOUTH EVERY MORNING WITH BREAKFAST(GENERIC DIABETA) 90 Tab 3   • atorvastatin (LIPITOR) 40 MG Tab Take 1 Tab by mouth every evening. 90 Tab 3   • diltiazem CD (CARTIA XT) 180 MG CAPSULE SR 24 HR Take 1 Cap by mouth every day. 90 Cap 3   • CARTIA  MG CAPSULE SR 24 HR TAKE ONE  "CAPSULE BY MOUTH DAILY 90 Cap 0   • ibuprofen (MOTRIN) 800 MG Tab Take 1 Tab by mouth every 8 hours as needed. 30 Tab 1   • Blood Glucose Monitoring Suppl SUPPLIES MISC Test strips order: Test strips for Accu Check Compact meter. Sig: use once daily before breakfast and prn ssx high or low sugar #150 RF x 3 150 Strip 5     No current facility-administered medications for this visit.       She  has a past medical history of Hypercholesteremia; Hypertension; Cold (3/29/12); Chronic low back pain; Other specified disorder of intestines; Dental disorder; Anesthesia; DM (diabetes mellitus) (CMS-HCC); and Mass of cecum (9/15). She also has no past medical history of Breast cancer (CMS-HCC).      ROS  As stated in HPI      Objective:     Blood pressure 138/70, pulse 77, temperature 37 °C (98.6 °F), height 1.702 m (5' 7.01\"), weight 93.895 kg (207 lb), SpO2 97 %, not currently breastfeeding. Body mass index is 32.41 kg/(m^2).  Physical Exam:  General: Alert, oriented, in no acute distress.  Eye contact is good, speech goal directed, affect calm  CNs grossly intact.  Gross hearing intact.  Abdomen: Soft, ND, TTP over LLQ and upper abdomen and both epigastric region and BUQ. No rebound tenderness or guarding. No hepatosplenomegaly. Bowel sounds active.   Ext: no edema, normal color and temperature.   Gait steady.     Assessment and Plan:   Assessment/Plan:  1. C. difficile diarrhea  Not resolved based on symptoms, too soon for stool to be accurate with the negative result. Check labs. Start oral vanco due to continued symptoms   - CBC WITH DIFFERENTIAL; Future  - COMP METABOLIC PANEL; Future  - CT-ABDOMEN-PELVIS WITH  - vancomycin (VANCOCIN) 250 MG capsule; Take 1 Cap by mouth 4 times a day.  Dispense: 56 Cap; Refill: 0    2. LLQ abdominal pain  Not resolved. Due to history of diverticula on colonoscopy, left lower quadrant pain, diarrhea, and failed treatment with Flagyl, it is reasonable to obtain a CT scan at this time to " evaluate for diverticulitis. She has been having symptoms now for a total of nearly 4 months.  - CT-ABDOMEN-PELVIS WITH       Follow up:  Return pending results .    Educated in proper administration of medication(s) ordered today including safety, possible SE, risks, benefits, rationale and alternatives to therapy.   Supportive care, differential diagnoses, and indications for immediate follow-up discussed with patient.    Pathogenesis of diagnosis discussed including typical length and natural progression.    Instructed to return to clinic or nearest emergency department for any change in condition, further concerns, or worsening of symptoms.  Patient states understanding of the plan of care and discharge instructions.      Please note that this dictation was created using voice recognition software. I have made every reasonable attempt to correct obvious errors, but I expect that there are errors of grammar and possibly content that I did not discover before finalizing the note.    Followup: Return pending results . sooner should new symptoms or problems arise.

## 2017-07-20 ENCOUNTER — TELEPHONE (OUTPATIENT)
Dept: MEDICAL GROUP | Facility: PHYSICIAN GROUP | Age: 62
End: 2017-07-20

## 2017-07-20 RX ORDER — CIPROFLOXACIN 500 MG/1
500 TABLET, FILM COATED ORAL 2 TIMES DAILY
Qty: 14 TAB | Refills: 0 | Status: SHIPPED | OUTPATIENT
Start: 2017-07-20 | End: 2017-09-22

## 2017-07-20 NOTE — TELEPHONE ENCOUNTER
I have tried to call patient multiple times yesterday and today and she doesn't have her voicemail box set up. Please tell her to email me on [employer] to avoid this telephone tag. I sent her one just now.    JEANNE Montes.

## 2017-07-20 NOTE — TELEPHONE ENCOUNTER
Pt received the results of her CT scan.  She is concerned about the pain she is still having.  She is wondering if the scan said anything about the colon.

## 2017-08-09 ENCOUNTER — TELEPHONE (OUTPATIENT)
Dept: MEDICAL GROUP | Facility: PHYSICIAN GROUP | Age: 62
End: 2017-08-09

## 2017-08-09 DIAGNOSIS — A04.72 C. DIFFICILE DIARRHEA: ICD-10-CM

## 2017-08-09 NOTE — TELEPHONE ENCOUNTER
Stool test is ordered but she should wait to do this for at least 3 weeks after antibiotics have been completed.    JEANNE Montes.

## 2017-08-09 NOTE — TELEPHONE ENCOUNTER
1. Caller Name: Brenda Davidson                                         Call Back Number: [phone] (home)       Patient approves a detailed voicemail message: N\A    2. SPECIFIC Action To Be Taken: lab orders     3. Diagnosis/Clinical Reason for Request: C-Diff  Pt wants to see if it has cleared up Just finished antibiotics    4. Specialty & Provider Name/Lab/Imaging Location: [facility]    5. Is appointment scheduled for requested order/referral: no    Patient informed they will receive a return phone call from the office ONLY if there are any questions before processing their request. Advised to call back if they haven't received a call from the referral department in 5 days.

## 2017-08-09 NOTE — TELEPHONE ENCOUNTER
VOICEMAIL  1. Caller Name: Brenda Davidson                      Call Back Number: [phone] (home)     2. Message: Left detail message any question to call    3. Patient approves office to leave a detailed voicemail/MyChart message: N\A

## 2017-08-26 ENCOUNTER — HOSPITAL ENCOUNTER (OUTPATIENT)
Facility: MEDICAL CENTER | Age: 62
End: 2017-08-26
Attending: NURSE PRACTITIONER
Payer: COMMERCIAL

## 2017-08-26 DIAGNOSIS — A04.72 C. DIFFICILE DIARRHEA: ICD-10-CM

## 2017-08-26 LAB
C DIFF DNA SPEC QL NAA+PROBE: NEGATIVE
C DIFF TOX A+B STL QL IA: NEGATIVE
C DIFF TOX GENS STL QL NAA+PROBE: NORMAL

## 2017-08-26 PROCEDURE — 87493 C DIFF AMPLIFIED PROBE: CPT

## 2017-08-26 PROCEDURE — 87324 CLOSTRIDIUM AG IA: CPT

## 2017-09-22 ENCOUNTER — OFFICE VISIT (OUTPATIENT)
Dept: MEDICAL GROUP | Facility: PHYSICIAN GROUP | Age: 62
End: 2017-09-22
Payer: COMMERCIAL

## 2017-09-22 VITALS
HEIGHT: 67 IN | BODY MASS INDEX: 32.18 KG/M2 | WEIGHT: 205 LBS | TEMPERATURE: 98.8 F | RESPIRATION RATE: 14 BRPM | OXYGEN SATURATION: 98 % | SYSTOLIC BLOOD PRESSURE: 148 MMHG | HEART RATE: 90 BPM | DIASTOLIC BLOOD PRESSURE: 84 MMHG

## 2017-09-22 DIAGNOSIS — E11.9 TYPE 2 DIABETES MELLITUS WITHOUT COMPLICATION, WITHOUT LONG-TERM CURRENT USE OF INSULIN (HCC): ICD-10-CM

## 2017-09-22 DIAGNOSIS — A04.72 C. DIFFICILE DIARRHEA: ICD-10-CM

## 2017-09-22 DIAGNOSIS — I10 ESSENTIAL HYPERTENSION: ICD-10-CM

## 2017-09-22 PROCEDURE — 99214 OFFICE O/P EST MOD 30 MIN: CPT | Performed by: FAMILY MEDICINE

## 2017-09-22 NOTE — PROGRESS NOTES
Subjective:   Brenda Davidson is a 62 y.o. female here today for Diabetes, hypertension, C. difficile    Type 2 diabetes mellitus without complication  Ongoing issue. Patient reports 100% compliance with metformin 1000 mg twice daily and glyburide 5 mg once daily. She denies any issues with abdominal pain, bloating, cramping secondary to medication. Blood work completed 2 months ago did show mildly elevated fasting blood sugar but at that time she was dealing with an infection.    Essential hypertension  Ongoing issue. Patient reports 100% compliance with losartan 100 mg daily and diltiazem 180 mg daily. Patient denies any issues with headache, dizziness, chest pain. Chart review shows that blood pressure slightly above the recommended range.    C. difficile diarrhea  Ongoing issue. Patient reports that she has been evaluated with new test which shows that the infection has been resolved. She continues to have some abdominal bloating and diarrhea that occurs once to twice a month. Overall she states that she is doing significantly better.         Current medicines (including changes today)  Current Outpatient Prescriptions   Medication Sig Dispense Refill   • metformin (GLUCOPHAGE) 1000 MG tablet Take 1 Tab by mouth 2 times a day, with meals. 180 Tab 3   • losartan (COZAAR) 100 MG Tab Take 1 Tab by mouth every day. 90 Tab 3   • glyBURIDE (DIABETA) 5 MG Tab TAKE ONE TABLET BY MOUTH EVERY MORNING WITH BREAKFAST(GENERIC DIABETA) 90 Tab 3   • atorvastatin (LIPITOR) 40 MG Tab Take 1 Tab by mouth every evening. 90 Tab 3   • diltiazem CD (CARTIA XT) 180 MG CAPSULE SR 24 HR Take 1 Cap by mouth every day. 90 Cap 3   • CARTIA  MG CAPSULE SR 24 HR TAKE ONE CAPSULE BY MOUTH DAILY 90 Cap 0   • Blood Glucose Monitoring Suppl SUPPLIES MISC Test strips order: Test strips for Accu Check Compact meter. Sig: use once daily before breakfast and prn ssx high or low sugar #150 RF x 3 150 Strip 5   • ibuprofen (MOTRIN) 800 MG  "Tab Take 1 Tab by mouth every 8 hours as needed. 30 Tab 1     No current facility-administered medications for this visit.      She  has a past medical history of Anesthesia; Chronic low back pain; Cold (3/29/12); Dental disorder; DM (diabetes mellitus) (CMS-HCC); Hypercholesteremia; Hypertension; Mass of cecum (9/15); and Other specified disorder of intestines. She also has no past medical history of Breast cancer (CMS-HCC).    ROS   No chest pain, no shortness of breath, no abdominal pain  +Diarrhea     Objective:     Blood pressure 148/84, pulse 90, temperature 37.1 °C (98.8 °F), resp. rate 14, height 1.702 m (5' 7\"), weight 93 kg (205 lb), SpO2 98 %. Body mass index is 32.11 kg/m².   Physical Exam:  Alert, oriented in no acute distress.  Eye contact is good, speech goal directed, affect calm  HEENT: conjunctiva non-injected, sclera non-icteric.  Pinna normal. Oral mucous membranes pink and moist with no lesions.  Neck No adenopathy or masses in the neck or supraclavicular regions.  Lungs: clear to auscultation bilaterally with good excursion.  CV: regular rate and rhythm.  Abdomen: soft, nontender, No CVAT; Obese; no guarding  Ext: no edema, color normal, vascularity normal, temperature normal        Assessment and Plan:   The following treatment plan was discussed     1. C. difficile diarrhea      Resolved. Monitor   2. Type 2 diabetes mellitus without complication, without long-term current use of insulin (CMS-HCC)  COMP METABOLIC PANEL    HEMOGLOBIN A1C    Stable. Recheck labs in 3 months; continue current medications; monitor   3. Essential hypertension      Stable. Continue current medications; reevaluate at next appointment       Followup: Return in about 3 months (around 12/22/2017) for lab review, Short.            "

## 2017-09-22 NOTE — ASSESSMENT & PLAN NOTE
Ongoing issue. Patient reports 100% compliance with metformin 1000 mg twice daily and glyburide 5 mg once daily. She denies any issues with abdominal pain, bloating, cramping secondary to medication. Blood work completed 2 months ago did show mildly elevated fasting blood sugar but at that time she was dealing with an infection.

## 2017-09-22 NOTE — ASSESSMENT & PLAN NOTE
Ongoing issue. Patient reports that she has been evaluated with new test which shows that the infection has been resolved. She continues to have some abdominal bloating and diarrhea that occurs once to twice a month. Overall she states that she is doing significantly better.

## 2017-11-06 ENCOUNTER — OFFICE VISIT (OUTPATIENT)
Dept: MEDICAL GROUP | Facility: PHYSICIAN GROUP | Age: 62
End: 2017-11-06
Payer: COMMERCIAL

## 2017-11-06 ENCOUNTER — HOSPITAL ENCOUNTER (OUTPATIENT)
Facility: MEDICAL CENTER | Age: 62
End: 2017-11-06
Attending: FAMILY MEDICINE
Payer: COMMERCIAL

## 2017-11-06 VITALS
BODY MASS INDEX: 32.49 KG/M2 | DIASTOLIC BLOOD PRESSURE: 96 MMHG | SYSTOLIC BLOOD PRESSURE: 150 MMHG | TEMPERATURE: 99.3 F | OXYGEN SATURATION: 93 % | RESPIRATION RATE: 12 BRPM | HEIGHT: 67 IN | HEART RATE: 82 BPM | WEIGHT: 207 LBS

## 2017-11-06 DIAGNOSIS — B02.9 HERPES ZOSTER WITHOUT COMPLICATION: ICD-10-CM

## 2017-11-06 DIAGNOSIS — R82.90 BAD ODOR OF URINE: ICD-10-CM

## 2017-11-06 LAB
APPEARANCE UR: NORMAL
BILIRUB UR STRIP-MCNC: NORMAL MG/DL
COLOR UR AUTO: NORMAL
GLUCOSE UR STRIP.AUTO-MCNC: NORMAL MG/DL
KETONES UR STRIP.AUTO-MCNC: NORMAL MG/DL
LEUKOCYTE ESTERASE UR QL STRIP.AUTO: NORMAL
NITRITE UR QL STRIP.AUTO: NORMAL
PH UR STRIP.AUTO: 5 [PH] (ref 5–8)
PROT UR QL STRIP: NORMAL MG/DL
RBC UR QL AUTO: NORMAL
SP GR UR STRIP.AUTO: 1.01
UROBILINOGEN UR STRIP-MCNC: NORMAL MG/DL

## 2017-11-06 PROCEDURE — 87077 CULTURE AEROBIC IDENTIFY: CPT

## 2017-11-06 PROCEDURE — 87186 SC STD MICRODIL/AGAR DIL: CPT

## 2017-11-06 PROCEDURE — 87086 URINE CULTURE/COLONY COUNT: CPT

## 2017-11-06 PROCEDURE — 99214 OFFICE O/P EST MOD 30 MIN: CPT | Performed by: FAMILY MEDICINE

## 2017-11-06 PROCEDURE — 81002 URINALYSIS NONAUTO W/O SCOPE: CPT | Performed by: FAMILY MEDICINE

## 2017-11-06 RX ORDER — VALACYCLOVIR HYDROCHLORIDE 1 G/1
1000 TABLET, FILM COATED ORAL 3 TIMES DAILY
Qty: 21 TAB | Refills: 0 | Status: SHIPPED | OUTPATIENT
Start: 2017-11-06 | End: 2017-11-13

## 2017-11-06 RX ORDER — OXYCODONE HYDROCHLORIDE 5 MG/1
5 TABLET ORAL EVERY 6 HOURS PRN
Qty: 30 TAB | Refills: 0 | Status: SHIPPED | OUTPATIENT
Start: 2017-11-06 | End: 2017-12-13

## 2017-11-06 NOTE — PROGRESS NOTES
Subjective:   Brenda Davidson is a 62 y.o. female here today for rash and dysuria    HPI :   Patient is here with painful rash that started 4 days back. Located in the right lower flank up to the upper back. Reports that it is very painful, to touch. She has also been experiencing some chills but no fever. She has a history of shingles about 10 years back when she had to be hospitalized. She did take Advil 800 mg for pain but did not help her.Rash is also itchy.    She is also a type II diabetic.    Patient is also complaining of occasional dysuria and bad odor in urine. No vaginal discharge.      Current medicines (including changes today)  Current Outpatient Prescriptions   Medication Sig Dispense Refill   • valacyclovir (VALTREX) 1 GM Tab Take 1 Tab by mouth 3 times a day for 7 days. 21 Tab 0   • oxycodone immediate-release (ROXICODONE) 5 MG Tab Take 1 Tab by mouth every 6 hours as needed for Severe Pain. 30 Tab 0   • metformin (GLUCOPHAGE) 1000 MG tablet Take 1 Tab by mouth 2 times a day, with meals. 180 Tab 3   • losartan (COZAAR) 100 MG Tab Take 1 Tab by mouth every day. 90 Tab 3   • glyBURIDE (DIABETA) 5 MG Tab TAKE ONE TABLET BY MOUTH EVERY MORNING WITH BREAKFAST(GENERIC DIABETA) 90 Tab 3   • atorvastatin (LIPITOR) 40 MG Tab Take 1 Tab by mouth every evening. 90 Tab 3   • diltiazem CD (CARTIA XT) 180 MG CAPSULE SR 24 HR Take 1 Cap by mouth every day. 90 Cap 3   • ibuprofen (MOTRIN) 800 MG Tab Take 1 Tab by mouth every 8 hours as needed. 30 Tab 1   • Blood Glucose Monitoring Suppl SUPPLIES MISC Test strips order: Test strips for Accu Check Compact meter. Sig: use once daily before breakfast and prn ssx high or low sugar #150 RF x 3 150 Strip 5   • CARTIA  MG CAPSULE SR 24 HR TAKE ONE CAPSULE BY MOUTH DAILY 90 Cap 0     No current facility-administered medications for this visit.      She  has a past medical history of Anesthesia; Chronic low back pain; Cold (3/29/12); Dental disorder; DM (diabetes  "mellitus) (CMS-HCC); Hypercholesteremia; Hypertension; Mass of cecum (9/15); and Other specified disorder of intestines. She also has no past medical history of Breast cancer (CMS-HCC).    ROS   No chest pain, no shortness of breath, no abdominal pain       Objective:     Blood pressure 150/96, pulse 82, temperature 37.4 °C (99.3 °F), resp. rate 12, height 1.702 m (5' 7\"), weight 93.9 kg (207 lb), SpO2 93 %. Body mass index is 32.42 kg/m².     PHYSICAL EXAM     GEN: Alert and oriented,well appearing, no acute distress  SKIN: warm, dry to touch, erythematous vesiculobullous rash without crusting on the right flank to the right upper back, + tenderness  PSYCH: mood and affect normal, judgement normal  EYES: Conjunctiva clear, lids normal,pupils equal round and reactive  ENMT: Normal external nose and ears,EACs normal appearing, TM pearly gray with normal light reflex bilaterally,nasal mucosa and turbinates normal appearing without erythema or edema, lips without lesions,good dentition,oropharynx clear  Neck : Trachea midline, no masses or swelling, no thyromegaly  LYMPHATIC : No cervical or supraclavicular lymphadenopathy  RESPIRATORY : Unlabored respiratory effort, no distress noted, clear to auscultation bilaterally, no wheeze, rhonchi, crackles  CARDIOVASCULAR: RRR, S1 S2 normal, no murmurs  MUSCULOSKELETAL : Normal gait and stance, no obvious abnormalities   NEURO: No overt focal neurologic deficits,sensation intact        Assessment and Plan:   The following treatment plan was discussed    1. Herpes zoster without complication  New problem.Painful rash as described above consistent with shingles.  Will treat with Valtrex for 7 days due to active vesicles present.Continue ibuprofen as needed for pain.Rx for oxycodone to take as needed for severe pain.  Advised to apply Caladryl lotion locally.May take Benadryl prn for itching.  Keep lesions covered and practice hand hygiene measures  - valacyclovir (VALTREX) 1 GM " Tab; Take 1 Tab by mouth 3 times a day for 7 days.  Dispense: 21 Tab; Refill: 0  - oxycodone immediate-release (ROXICODONE) 5 MG Tab; Take 1 Tab by mouth every 6 hours as needed for Severe Pain.  Dispense: 30 Tab; Refill: 0    2. Bad odor of urine  New problem.POCT UA - few leuks, no other signs for infection  Will send for urine culture.No antibiotics at this time  - POCT Urinalysis  - URINE CULTURE(NEW); Future    Followup: Return in about 4 weeks (around 12/4/2017) for follow up on on shingles.         Please note that this dictation was created using voice recognition software. I have made every reasonable attempt to correct obvious errors, but I expect that there are errors of grammar and possibly content that I did not discover before finalizing the note.

## 2017-11-06 NOTE — LETTER
November 6, 2017         Patient: Brenda Davidson   YOB: 1955   Date of Visit: 11/6/2017           To Whom it May Concern:    Brenda Davidson was seen in my clinic on 11/6/2017. She may return to work on 11/13/2017.  She may work if symptoms start to improve.    If you have any questions or concerns, please don't hesitate to call.        Sincerely,           Rivera Zamorano M.D.  Electronically Signed

## 2017-11-07 DIAGNOSIS — R82.90 BAD ODOR OF URINE: ICD-10-CM

## 2017-11-09 ENCOUNTER — TELEPHONE (OUTPATIENT)
Dept: MEDICAL GROUP | Facility: PHYSICIAN GROUP | Age: 62
End: 2017-11-09

## 2017-11-09 LAB
BACTERIA UR CULT: ABNORMAL
BACTERIA UR CULT: ABNORMAL
SIGNIFICANT IND 70042: ABNORMAL
SITE SITE: ABNORMAL
SOURCE SOURCE: ABNORMAL

## 2017-11-12 RX ORDER — NITROFURANTOIN 25; 75 MG/1; MG/1
100 CAPSULE ORAL 2 TIMES DAILY
Qty: 10 CAP | Refills: 0 | Status: SHIPPED | OUTPATIENT
Start: 2017-11-12 | End: 2017-11-17

## 2017-12-13 ENCOUNTER — OFFICE VISIT (OUTPATIENT)
Dept: MEDICAL GROUP | Facility: PHYSICIAN GROUP | Age: 62
End: 2017-12-13
Payer: COMMERCIAL

## 2017-12-13 VITALS
HEART RATE: 79 BPM | TEMPERATURE: 98.8 F | BODY MASS INDEX: 32.96 KG/M2 | SYSTOLIC BLOOD PRESSURE: 132 MMHG | DIASTOLIC BLOOD PRESSURE: 82 MMHG | HEIGHT: 67 IN | WEIGHT: 210 LBS | OXYGEN SATURATION: 95 %

## 2017-12-13 DIAGNOSIS — M25.512 ACUTE PAIN OF LEFT SHOULDER: ICD-10-CM

## 2017-12-13 PROCEDURE — 99213 OFFICE O/P EST LOW 20 MIN: CPT | Performed by: FAMILY MEDICINE

## 2017-12-14 NOTE — ASSESSMENT & PLAN NOTE
New problem. Patient is reporting that 3 days ago she woke up and started having pain at the base of the neck on the left side that is radiated into the top part of the shoulder. She describes the pain as achy to sharp in nature; comes and goes; it is painful to mobilize the arm. She denies any numbness or tingling; she denies any arm weakness. She has tried over-the-counter ibuprofen and this is only mildly beneficial.

## 2017-12-19 RX ORDER — CYCLOBENZAPRINE HCL 5 MG
5 TABLET ORAL 2 TIMES DAILY PRN
Qty: 30 TAB | Refills: 0 | Status: SHIPPED | OUTPATIENT
Start: 2017-12-19 | End: 2018-08-17

## 2018-02-21 RX ORDER — GLYBURIDE 5 MG/1
TABLET ORAL
Qty: 90 TAB | Refills: 2 | Status: SHIPPED | OUTPATIENT
Start: 2018-02-21 | End: 2018-10-31 | Stop reason: SDUPTHER

## 2018-02-23 RX ORDER — ATORVASTATIN CALCIUM 40 MG/1
TABLET, FILM COATED ORAL
Qty: 90 TAB | Refills: 2 | Status: SHIPPED | OUTPATIENT
Start: 2018-02-23 | End: 2018-12-31 | Stop reason: SDUPTHER

## 2018-03-07 ENCOUNTER — OFFICE VISIT (OUTPATIENT)
Dept: URGENT CARE | Facility: PHYSICIAN GROUP | Age: 63
End: 2018-03-07
Payer: COMMERCIAL

## 2018-03-07 VITALS
TEMPERATURE: 100 F | HEART RATE: 88 BPM | WEIGHT: 210 LBS | HEIGHT: 67 IN | RESPIRATION RATE: 20 BRPM | DIASTOLIC BLOOD PRESSURE: 94 MMHG | SYSTOLIC BLOOD PRESSURE: 156 MMHG | BODY MASS INDEX: 32.96 KG/M2 | OXYGEN SATURATION: 93 %

## 2018-03-07 DIAGNOSIS — J11.1 INFLUENZA: ICD-10-CM

## 2018-03-07 PROCEDURE — 99214 OFFICE O/P EST MOD 30 MIN: CPT | Performed by: PHYSICIAN ASSISTANT

## 2018-03-07 RX ORDER — ALBUTEROL SULFATE 90 UG/1
2 AEROSOL, METERED RESPIRATORY (INHALATION) EVERY 4 HOURS PRN
Qty: 1 INHALER | Refills: 0 | Status: SHIPPED | OUTPATIENT
Start: 2018-03-07 | End: 2018-10-31

## 2018-03-07 ASSESSMENT — ENCOUNTER SYMPTOMS
NAUSEA: 1
COUGH: 1
FEVER: 1
PSYCHIATRIC NEGATIVE: 1
MYALGIAS: 1
EYES NEGATIVE: 1
SORE THROAT: 1
CHILLS: 1
CARDIOVASCULAR NEGATIVE: 1
HEADACHES: 1
SINUS PAIN: 1

## 2018-03-07 NOTE — PATIENT INSTRUCTIONS
Flonase and nasal saline irrigation (netti pot or Vitaly Med Sinus Rinse).  Used distilled water or boiled tap water with nasal flushes, not straight tap water.  Humidifier at bedtime.  Hot steam showers to loosen up mucous.  Cough medicine at bedtime (nyquil).  Delsym during the day.  Lots of fluids, tea with honey.  Ibuprofen for headache, fever, chills.  Be sure to take with food.  Return if worsening: Yellow thicker mucus changes, worsening pain around the eyes and radiates to the teeth, and fever over 101°F.

## 2018-03-07 NOTE — PROGRESS NOTES
Subjective:      Brenda Davidson is a 62 y.o. female who presents with Cough (sore throat, congestion x 4 days)            HPI  Chief Complaint   Patient presents with   • Cough     sore throat, congestion x 4 days       HPI:  Brenda Davidson is a 62 y.o. female who presents with URI x 4 days.  Fever has been 102 on Sunday.  No flu yet.  Body aches.  Nausea.  Low appetite.  Nyquil at bedtime and zicam.  Cough is non productive.  Patient denies SOB, chest pain, palpitations, no v/d.      Past Medical History:   Diagnosis Date   • Anesthesia     hard time waking up   • Chronic low back pain    • Cold 3/29/12    COLD   • Dental disorder    • DM (diabetes mellitus) (CMS-[facility])     oral medication   • Hypercholesteremia    • Hypertension    • Mass of cecum 9/15    tubulovillous adenoma polyp   • Other specified disorder of intestines        Past Surgical History:   Procedure Laterality Date   • BOWEL RESECTION LAPAROSCOPIC  8/21/2015    Procedure: LAPAROSCOPIC ILEOCOLIC RESECTION;  Surgeon: Kris Garrison M.D.;  Location: SURGERY [facility];  Service:    • CHOLECYSTECTOMY  8/21/2015    Procedure: LAPAROSCOPIC CHOLECYSTECTOMY;  Surgeon: Kris Garrison M.D.;  Location: SURGERY [facility];  Service:    • COLONOSCOPY WITH POLYP  8/13/15    multiple polyps, tumor cecum, diverticulosis, int hemorrhoids   • ARTHROSCOPY, KNEE  3/14    torn meniscus   • PELVISCOPY  4/6/2012    Performed by HILDA MACE at SURGERY SAME DAY [facility] ORS   • LYSIS ADHESIONS GYN  4/6/2012    Performed by HILDA MACE at SURGERY SAME DAY [facility] ORS   • COLONOSCOPY WITH POLYP  2012    3 polyps - hyperplastic, benign serrated, tubulovillous adenoma , scattered diverticula sigmoid colon - [facility]   • OTHER      TONSILLECTOMY   • OTHER      REPAIR LT URETER AGE 19       Family History   Problem Relation Age of Onset   • Cancer Mother      breast cancer     No pertinent family history.    Social History     Social History   • Marital  "status: Single     Spouse name: N/A   • Number of children: N/A   • Years of education: N/A     Occupational History   • Not on file.     Social History Main Topics   • Smoking status: Never Smoker   • Smokeless tobacco: Never Used   • Alcohol use No   • Drug use: No   • Sexual activity: Not Currently      Comment: work: retail.      Other Topics Concern   • Not on file     Social History Narrative   • No narrative on file         Current Outpatient Prescriptions:   •  atorvastatin, TAKE ONE TABLET BY MOUTH EVERY EVENING, 3/6/2018  •  glyBURIDE, TAKE ONE TABLET BY MOUTH EVERY MORNING WITH BREAKFAST, 3/6/2018  •  metformin, TAKE ONE TABLET BY MOUTH TWICE A DAY WITH MEALS, 3/6/2018  •  losartan, TAKE ONE TABLET BY MOUTH DAILY, 3/6/2018  •  cyclobenzaprine, 5 mg, Oral, BID PRN, 3/6/2018  •  CARTIA XT, TAKE ONE CAPSULE BY MOUTH DAILY, 3/6/2018  •  ibuprofen, 800 mg, Oral, Q8HRS PRN, 3/6/2018  •  Blood Glucose Monitoring Suppl, Test strips order: Test strips for Accu Check Compact meter. Sig: use once daily before breakfast and prn ssx high or low sugar #150 RF x 3, 3/6/2018  •  CARTIA XT, TAKE ONE CAPSULE BY MOUTH DAILY    Allergies   Allergen Reactions   • Codeine Rash and Itching     Vicodin doesn't cause her any problems   • Ace Inhibitors      cough   • Flagyl [Metronidazole] Nausea        Review of Systems   Constitutional: Positive for chills, fever and malaise/fatigue.   HENT: Positive for congestion, sinus pain and sore throat.    Eyes: Negative.    Respiratory: Positive for cough.    Cardiovascular: Negative.    Gastrointestinal: Positive for nausea.   Genitourinary: Negative.    Musculoskeletal: Positive for myalgias.   Skin: Negative.    Neurological: Positive for headaches.   Endo/Heme/Allergies: Negative.    Psychiatric/Behavioral: Negative.           Objective:     /94   Pulse 88   Temp 37.8 °C (100 °F)   Resp 20   Ht 1.702 m (5' 7\")   Wt 95.3 kg (210 lb)   SpO2 93%   BMI 32.89 kg/m²  "     Physical Exam       Nursing note and vital signs reviewed.    Constitutional:  Appropriately groomed, pleasant affect, well nourished, and in no acute distress.    HEENT:  Head: Atruamatic, normocephalic.    Ears:  EAC with mild cerumen bilaterally, not erythematous.  TM’s pearly gray with cone of light present and umbo and malleolus visible bilaterally.  No bulging or fluid bubbles present in middle ear.    Eyes:  PERRLA, EOM's full, sclera white, conjunctiva not erythematous, and medial canthus without exudate bilaterally.    Nose:  Nares patent bilaterally.  Nasal mucosa edematous with clear rhinorrhea bilaterally.  Frontal and maxillary sinuses not tender to percussion.    Throat:  Oropharynx erythematous, with no enlargement of the palatine tonsils bilaterally with no exudates.    Posterior oropharynx with cobblestoning and clear to white post nasal drainage present.  Soft palate rises symmetrically bilaterally and uvula midline.  Neck supple, with mild proximal anterior cervical chain lymphadenopathy that is soft and mobile to palpation.      Lungs: Respiratory effort within normal limits. Lungs with slight inspiratory wheezes to auscultation. No crackles or rhonchi noted.     Heart:  Regular rate and rhythm without rubs, murmurs, or gallops. Dorsalis pedis and radial pulses 2+ bilaterally. No lower extremity edema.    Muscle skeletal:  Gait and station wnl, non antalgic.    Derm:  No lesions or rashes. Overall good turgor pressure.     Psychiatric:  Normal judgement, mood and affect.;     Assessment/Plan:     1. Influenza  Hydrocod Polst-CPM Polst ER (TUSSIONEX PENNKINETIC ER) 10-8 MG/5ML Suspension Extended Release    albuterol 108 (90 Base) MCG/ACT Aero Soln inhalation aerosol      Patient presents with flulike symptoms outside of treatment or testing window. On exam patient does have slight inspiratory wheezes to auscultation with a slightly elevated temperature. Oxygen saturation 93% room air. Plan to  treat with cough medicine and albuterol inhaler.    Narcotic use report obtained with no indication of narcotic overuse or misuse and deemed necessary for treatment. Sedation, dependence, and constipation precautions given. Avoid use while driving or operating heavy machinery.     Patient was in agreement with this treatment plan and seemed to understand without barriers. All questions were encouraged and answered.  Reviewed signs and symptoms of when to seek emergency medical care.     Please note that this dictation was created using voice recognition software.  I have made every reasonable attempt to correct obvious errors, but I expect there are errors of sia and possibly content that I did not discover before finalizing the note.

## 2018-03-07 NOTE — LETTER
March 7, 2018         Patient: Brenda Davidson   YOB: 1955   Date of Visit: 3/7/2018           To Whom it May Concern:    Brenda Davidson was seen in my clinic on 3/7/2018. Please excuse her from work 3/6-3/9.  May return sooner if able.    If you have any questions or concerns, please don't hesitate to call.        Sincerely,           Ludwin Arce P.A.-C.  Electronically Signed

## 2018-03-17 ENCOUNTER — APPOINTMENT (OUTPATIENT)
Dept: RADIOLOGY | Facility: IMAGING CENTER | Age: 63
End: 2018-03-17
Attending: PHYSICIAN ASSISTANT
Payer: COMMERCIAL

## 2018-03-17 ENCOUNTER — OFFICE VISIT (OUTPATIENT)
Dept: URGENT CARE | Facility: PHYSICIAN GROUP | Age: 63
End: 2018-03-17
Payer: COMMERCIAL

## 2018-03-17 VITALS
HEART RATE: 90 BPM | TEMPERATURE: 98.9 F | DIASTOLIC BLOOD PRESSURE: 82 MMHG | OXYGEN SATURATION: 93 % | BODY MASS INDEX: 32.8 KG/M2 | SYSTOLIC BLOOD PRESSURE: 150 MMHG | WEIGHT: 209 LBS | HEIGHT: 67 IN

## 2018-03-17 DIAGNOSIS — J22 LOWER RESPIRATORY INFECTION (E.G., BRONCHITIS, PNEUMONIA, PNEUMONITIS, PULMONITIS): Primary | ICD-10-CM

## 2018-03-17 DIAGNOSIS — R05.9 COUGH: ICD-10-CM

## 2018-03-17 DIAGNOSIS — G93.31 POST-INFLUENZA SYNDROME: ICD-10-CM

## 2018-03-17 PROCEDURE — 99214 OFFICE O/P EST MOD 30 MIN: CPT | Performed by: PHYSICIAN ASSISTANT

## 2018-03-17 PROCEDURE — 71046 X-RAY EXAM CHEST 2 VIEWS: CPT | Mod: TC | Performed by: PHYSICIAN ASSISTANT

## 2018-03-17 RX ORDER — AZITHROMYCIN 250 MG/1
TABLET, FILM COATED ORAL
Qty: 6 TAB | Refills: 0 | Status: SHIPPED | OUTPATIENT
Start: 2018-03-17 | End: 2018-04-02

## 2018-03-17 ASSESSMENT — ENCOUNTER SYMPTOMS
COUGH: 1
SHORTNESS OF BREATH: 1
MUSCULOSKELETAL NEGATIVE: 1
CHILLS: 1
CARDIOVASCULAR NEGATIVE: 1
WHEEZING: 1
HEADACHES: 1

## 2018-03-17 NOTE — PROGRESS NOTES
Subjective:      Brenda Davidson is a 62 y.o. female who presents with Flu Like Symptoms (x 1.5 week, dx of flu.. cough is not getting better )            HPI  Chief Complaint   Patient presents with   • Flu Like Symptoms     x 1.5 week, dx of flu.. cough is not getting better        HPI:  Brenda Davidson is a 62 y.o. female who presents with flu lke symptoms not improving now for 1.5 weeks.  Missed work last week and feeling worse.  Nonproductive cough. Worsening chest tightness. Albuterol inhaler does seem to help slightly. Cough medicine did work it at times but she is not out of this. Patient denies  chest pain, palpitations, fever, or n/v/d.      Past Medical History:   Diagnosis Date   • Anesthesia     hard time waking up   • Chronic low back pain    • Cold 3/29/12    COLD   • Dental disorder    • DM (diabetes mellitus) (CMS-[facility])     oral medication   • Hypercholesteremia    • Hypertension    • Mass of cecum 9/15    tubulovillous adenoma polyp   • Other specified disorder of intestines        Past Surgical History:   Procedure Laterality Date   • BOWEL RESECTION LAPAROSCOPIC  8/21/2015    Procedure: LAPAROSCOPIC ILEOCOLIC RESECTION;  Surgeon: Kris Garrison M.D.;  Location: SURGERY [facility];  Service:    • CHOLECYSTECTOMY  8/21/2015    Procedure: LAPAROSCOPIC CHOLECYSTECTOMY;  Surgeon: Kris Garrison M.D.;  Location: SURGERY [facility];  Service:    • COLONOSCOPY WITH POLYP  8/13/15    multiple polyps, tumor cecum, diverticulosis, int hemorrhoids   • ARTHROSCOPY, KNEE  3/14    torn meniscus   • PELVISCOPY  4/6/2012    Performed by HILDA MACE at SURGERY SAME DAY [facility] ORS   • LYSIS ADHESIONS GYN  4/6/2012    Performed by HILDA MACE at SURGERY SAME DAY [facility] ORS   • COLONOSCOPY WITH POLYP  2012    3 polyps - hyperplastic, benign serrated, tubulovillous adenoma , scattered diverticula sigmoid colon - [facility]   • OTHER      TONSILLECTOMY   • OTHER      REPAIR LT URETER AGE 19        Family History   Problem Relation Age of Onset   • Cancer Mother      breast cancer     No pertinent family history.    Social History     Social History   • Marital status: Single     Spouse name: N/A   • Number of children: N/A   • Years of education: N/A     Occupational History   • Not on file.     Social History Main Topics   • Smoking status: Never Smoker   • Smokeless tobacco: Never Used   • Alcohol use No   • Drug use: No   • Sexual activity: Not Currently      Comment: work: retail.      Other Topics Concern   • Not on file     Social History Narrative   • No narrative on file         Current Outpatient Prescriptions:   •  atorvastatin, TAKE ONE TABLET BY MOUTH EVERY EVENING, 3/17/2018  •  glyBURIDE, TAKE ONE TABLET BY MOUTH EVERY MORNING WITH BREAKFAST, 3/17/2018  •  metformin, TAKE ONE TABLET BY MOUTH TWICE A DAY WITH MEALS, 3/17/2018  •  losartan, TAKE ONE TABLET BY MOUTH DAILY, 3/17/2018  •  cyclobenzaprine, 5 mg, Oral, BID PRN, 3/17/2018  •  CARTIA XT, TAKE ONE CAPSULE BY MOUTH DAILY, 3/17/2018  •  ibuprofen, 800 mg, Oral, Q8HRS PRN, 3/17/2018  •  Blood Glucose Monitoring Suppl, Test strips order: Test strips for Accu Check Compact meter. Sig: use once daily before breakfast and prn ssx high or low sugar #150 RF x 3, 3/17/2018  •  albuterol, 2 Puff, Inhalation, Q4HRS PRN  •  CARTIA XT, TAKE ONE CAPSULE BY MOUTH DAILY    Allergies   Allergen Reactions   • Codeine Rash and Itching     Vicodin doesn't cause her any problems   • Ace Inhibitors      cough   • Flagyl [Metronidazole] Nausea        Review of Systems   Constitutional: Positive for chills and malaise/fatigue.   HENT: Positive for congestion.    Respiratory: Positive for cough, shortness of breath and wheezing.    Cardiovascular: Negative.    Genitourinary: Negative.    Musculoskeletal: Negative.    Skin: Negative.    Neurological: Positive for headaches.   All other systems reviewed and are negative.         Objective:     /82    "Pulse 90   Temp 37.2 °C (98.9 °F)   Ht 1.702 m (5' 7\")   Wt 94.8 kg (209 lb)   SpO2 93%   Breastfeeding? No   BMI 32.73 kg/m²      Physical Exam        Nursing note and vitals reviewed.    Constitutional:   Appropriately groomed, pleasant affect, well nourished, in NAD.    Head:   Normocephalic, atraumatic.    Eyes:   PERRLA, EOM's full, sclera white, conjunctiva not erythematous, and medial canthus without exudate bilaterally.    Ears:  Auricle and tragus non-tender to manipulation.  No pre-auricular lymphadenopathy or mastoid ttp.  EACs with mild cerumen bilaterally, not erythematous.  TM’s pearly gray with cone of light present and umbo and malleolus visible bilaterally.  No bulging or fluid bubbles present in middle ear.  Hearing grossly intact to voice.    Nose:  Nares not patent bilaterally.  Nasal mucosa edematous with white rhinorrhea bilaterally.  Mild sinus tenderness to percussion.    Throat:  Dentition wnl, mucosa moist without lesions.  Oropharynx mildly erythematous, with no enlargement of the palatine tonsils bilaterally with no exudates.    Post nasal drainage  present.  Soft palate rises symmetrically bilaterally and uvula midline.      Neck: Neck supple, with mild anterior lymphadenopathy that is soft and mobile to palpation. Thyroid non-palpable without tenderness or nodules. No supraclavicular lymphadenopathy.    Lungs:  Respiratory effort not labored without accessory muscle use.  Lungs with inspiratory wheezes to auscultation. No rales. Rhonchi cleared with cough.    Heart:  RRR, without murmurs rubs or gallops.  Radial and dorsalis pedis pulse 2+ bilaterally.  No LE edema.    Musculoskeletal:  Gait non-antalgic with a narrow base.    Derm:  Skin without rashes or lesions with good turgor pressure.      Psychiatric:  Mood, affect, and judgement appropriate.       3/17/2018 2:19 PM    HISTORY/REASON FOR EXAM:  Post influenza syndrome with cough for one week      TECHNIQUE/EXAM DESCRIPTION " AND NUMBER OF VIEWS:  Two views of the chest.    COMPARISON:  5/8/2013    FINDINGS:      The mediastinal and cardiac silhouette is unremarkable.    There is atherosclerosis of the aorta.    The lung parenchyma is clear.    There is no significant pleural effusion.    There is no visible pneumothorax.    There are no acute bony abnormalities.      Impression       1.  Unremarkable two view chest.       Assessment/Plan:     1. Post-influenza syndrome  DX-CHEST-2 VIEWS   2. Cough  DX-CHEST-2 VIEWS      Patient presents with post-influenza syndrome now with worsening cough is still nonproductive. Has been using albuterol inhaler with slight improvement. On exam patient's oxygen saturation is 92% room air. Lungs tight to auscultation with no significant crackles noted. The patient's diabetic status plan to treat with azithromycin and advised increasing probiotics. Also refilled patient's cough medicine. Advised continue with albuterol inhaler to improving.    Patient was in agreement with this treatment plan and seemed to understand without barriers. All questions were encouraged and answered.  Reviewed signs and symptoms of when to seek emergency medical care.     Please note that this dictation was created using voice recognition software.  I have made every reasonable attempt to correct obvious errors, but I expect there are errors of sia and possibly content that I did not discover before finalizing the note.

## 2018-04-02 ENCOUNTER — OFFICE VISIT (OUTPATIENT)
Dept: MEDICAL GROUP | Facility: PHYSICIAN GROUP | Age: 63
End: 2018-04-02
Payer: COMMERCIAL

## 2018-04-02 VITALS
TEMPERATURE: 99.1 F | BODY MASS INDEX: 33.27 KG/M2 | SYSTOLIC BLOOD PRESSURE: 136 MMHG | RESPIRATION RATE: 16 BRPM | WEIGHT: 212 LBS | DIASTOLIC BLOOD PRESSURE: 72 MMHG | HEART RATE: 82 BPM | OXYGEN SATURATION: 94 % | HEIGHT: 67 IN

## 2018-04-02 DIAGNOSIS — K62.5 BRIGHT RED RECTAL BLEEDING: ICD-10-CM

## 2018-04-02 PROBLEM — R82.90 BAD ODOR OF URINE: Status: RESOLVED | Noted: 2017-11-06 | Resolved: 2018-04-02

## 2018-04-02 PROBLEM — A04.72 C. DIFFICILE DIARRHEA: Status: RESOLVED | Noted: 2017-05-25 | Resolved: 2018-04-02

## 2018-04-02 PROCEDURE — 99214 OFFICE O/P EST MOD 30 MIN: CPT | Performed by: NURSE PRACTITIONER

## 2018-04-02 RX ORDER — HYDROCORTISONE ACETATE 25 MG/1
25 SUPPOSITORY RECTAL EVERY 12 HOURS
Qty: 10 SUPPOSITORY | Refills: 0 | Status: SHIPPED | OUTPATIENT
Start: 2018-04-02 | End: 2018-08-17

## 2018-04-02 NOTE — ASSESSMENT & PLAN NOTE
She has been having bright red blood in stool for two weeks with every bowel movement. It is occurring every single time. She feels like it is a significant amount.   There has been no diarrhea, constipation, or change in stool.   No pain with bowel movement. Has occasional nausea and lower abd pain, but it is very sporadic and mild.   States she does know she has had hemorrhoids externally, but there is no pain with wiping.  She did have C. Diff infection about 12 months ago and this resolved. Last colonoscopy 2015 showed diverticula in sigmoid and descending colon.    No F/C. +fatigue. No dizziness or lightheadedness.   No chest pain or PAIGE.   No change in medications. She did take a z-pack 3 weeks ago for URI. She did have colon resection in 2015 with Dr. Garrison.

## 2018-04-02 NOTE — PROGRESS NOTES
Subjective:     Chief Complaint   Patient presents with   • Stool Color Change     bleeding when using bathroom, bright red blood x2 wks, cramping, nausea       HPI  Brenda Davidson is a 62 y.o. female here today for blood in stool     Bright red rectal bleeding  She has been having bright red blood in stool for two weeks with every bowel movement. It is occurring every single time. She feels like it is a significant amount.   There has been no diarrhea, constipation, or change in stool.   No pain with bowel movement. Has occasional nausea and lower abd pain, but it is very sporadic and mild.   States she does know she has had hemorrhoids externally, but there is no pain with wiping.  She did have C. Diff infection about 12 months ago and this resolved. Last colonoscopy 2015 showed diverticula in sigmoid and descending colon.    No F/C. +fatigue. No dizziness or lightheadedness.   No chest pain or PAIGE.   No change in medications. She did take a z-pack 3 weeks ago for URI. She did have colon resection in 2015 with Dr. Garrison.        The encounter diagnosis was Bright red rectal bleeding.    Allergies: Codeine; Ace inhibitors; and Flagyl [metronidazole]  Current medicines (including changes today)  Current Outpatient Prescriptions   Medication Sig Dispense Refill   • hydrocortisone (ANUSOL-HC) 25 MG Suppos Insert 1 Suppository in rectum every 12 hours. 10 Suppository 0   • atorvastatin (LIPITOR) 40 MG Tab TAKE ONE TABLET BY MOUTH EVERY EVENING 90 Tab 2   • glyBURIDE (DIABETA) 5 MG Tab TAKE ONE TABLET BY MOUTH EVERY MORNING WITH BREAKFAST 90 Tab 2   • metformin (GLUCOPHAGE) 1000 MG tablet TAKE ONE TABLET BY MOUTH TWICE A DAY WITH MEALS 180 Tab 1   • CARTIA  MG CAPSULE SR 24 HR TAKE ONE CAPSULE BY MOUTH DAILY 90 Cap 2   • losartan (COZAAR) 100 MG Tab TAKE ONE TABLET BY MOUTH DAILY 90 Tab 2   • cyclobenzaprine (FLEXERIL) 5 MG tablet Take 1 Tab by mouth 2 times a day as needed for Muscle Spasms. 30 Tab 0   •  "ibuprofen (MOTRIN) 800 MG Tab Take 1 Tab by mouth every 8 hours as needed. 30 Tab 1   • Blood Glucose Monitoring Suppl SUPPLIES MISC Test strips order: Test strips for Accu Check Compact meter. Sig: use once daily before breakfast and prn ssx high or low sugar #150 RF x 3 150 Strip 5   • albuterol 108 (90 Base) MCG/ACT Aero Soln inhalation aerosol Inhale 2 Puffs by mouth every four hours as needed for Shortness of Breath. 1 Inhaler 0     No current facility-administered medications for this visit.        She  has a past medical history of Anesthesia; Chronic low back pain; Cold (3/29/12); Dental disorder; DM (diabetes mellitus) (CMS-HCC); Hypercholesteremia; Hypertension; Mass of cecum (9/15); and Other specified disorder of intestines. She also has no past medical history of Breast cancer (CMS-HCC).        ROS  As stated in HPI      Objective:     Blood pressure 136/72, pulse 82, temperature 37.3 °C (99.1 °F), resp. rate 16, height 1.702 m (5' 7\"), weight 96.2 kg (212 lb), SpO2 94 %. Body mass index is 33.2 kg/m².  Physical Exam:  General: Alert, oriented, in no acute distress.  Eye contact is good, speech goal directed, affect calm  CNs grossly intact.  HEENT: conjunctiva non-injected, sclera non-icteric, EOMs intact.   Gross hearing intact.  Lungs: unlabored. clear to auscultation bilaterally with good excursion.  CV: regular rate and rhythm  Abdomen: Soft, ND, mild TTP RUQ and LLQ. No TTP of RLQ or epigastric region. Bowel sounds active. Rectum with non-inflamed external hemorrhoids. No palpable masses of rectum. TTP at anterior side, but no definitely hemorrhoids only feel inflamed tissue. Occult blood positive.   Ext: no edema, normal color and temperature.   Skin: No rashes or lesions in visible areas  Gait steady.     Assessment and Plan:   Assessment/Plan:  1. Bright red rectal bleeding  Differentials: internal hemorrhoids, IBD, diverticulitis. Check labs. Pending CRP and sed rate, likely will treat for " presumptive diverticulitis d/t pain in LLQ. If labs normal, will treat as hemorrhoid with Anusol and f/u with GI. Monitor for diagnostic results.   - CBC WITH DIFFERENTIAL; Future  - Calprotectin, Fecal; Future  - CDIFF BY PCR; Future  - CULTURE STOOL; Future  - STOOL WBC'S; Future  - hydrocortisone (ANUSOL-HC) 25 MG Suppos; Insert 1 Suppository in rectum every 12 hours.  Dispense: 10 Suppository; Refill: 0  - CRP QUANTITIVE (NON-CARDIAC); Future  - WESTERGREN SED RATE; Future       Follow up:  Return in about 1 week (around 4/9/2018).    Educated in proper administration of medication(s) ordered today including safety, possible SE, risks, benefits, rationale and alternatives to therapy.   Supportive care, differential diagnoses, and indications for immediate follow-up discussed with patient.    Pathogenesis of diagnosis discussed including typical length and natural progression.    Instructed to return to clinic or nearest emergency department for any change in condition, further concerns, or worsening of symptoms.  Patient states understanding of the plan of care and discharge instructions.      Please note that this dictation was created using voice recognition software. I have made every reasonable attempt to correct obvious errors, but I expect that there are errors of grammar and possibly content that I did not discover before finalizing the note.    Followup: Return in about 1 week (around 4/9/2018). sooner should new symptoms or problems arise.

## 2018-04-03 ENCOUNTER — TELEPHONE (OUTPATIENT)
Dept: MEDICAL GROUP | Facility: PHYSICIAN GROUP | Age: 63
End: 2018-04-03

## 2018-04-03 ENCOUNTER — HOSPITAL ENCOUNTER (OUTPATIENT)
Dept: LAB | Facility: MEDICAL CENTER | Age: 63
End: 2018-04-03
Attending: NURSE PRACTITIONER
Payer: COMMERCIAL

## 2018-04-03 DIAGNOSIS — K62.5 BRIGHT RED RECTAL BLEEDING: ICD-10-CM

## 2018-04-03 LAB
BASOPHILS # BLD AUTO: 0.9 % (ref 0–1.8)
BASOPHILS # BLD: 0.06 K/UL (ref 0–0.12)
BUN BLD-MCNC: 36 MG/DL (ref 8–22)
CHLORIDE BLD-SCNC: 110 MMOL/L (ref 96–112)
CO2 BLD-SCNC: 23 MMOL/L (ref 20–33)
CREAT BLD-MCNC: 1.2 MG/DL (ref 0.5–1.4)
CRP SERPL HS-MCNC: 0.33 MG/DL (ref 0–0.75)
EOSINOPHIL # BLD AUTO: 0.31 K/UL (ref 0–0.51)
EOSINOPHIL NFR BLD: 4.5 % (ref 0–6.9)
ERYTHROCYTE [DISTWIDTH] IN BLOOD BY AUTOMATED COUNT: 48.3 FL (ref 35.9–50)
ERYTHROCYTE [SEDIMENTATION RATE] IN BLOOD BY WESTERGREN METHOD: 26 MM/HOUR (ref 0–30)
GLUCOSE BLD-MCNC: 223 MG/DL (ref 65–99)
HCT VFR BLD AUTO: 39.5 % (ref 37–47)
HGB BLD-MCNC: 12.9 G/DL (ref 12–16)
IMM GRANULOCYTES # BLD AUTO: 0.01 K/UL (ref 0–0.11)
IMM GRANULOCYTES NFR BLD AUTO: 0.1 % (ref 0–0.9)
LYMPHOCYTES # BLD AUTO: 2.77 K/UL (ref 1–4.8)
LYMPHOCYTES NFR BLD: 40.4 % (ref 22–41)
MCH RBC QN AUTO: 30 PG (ref 27–33)
MCHC RBC AUTO-ENTMCNC: 32.7 G/DL (ref 33.6–35)
MCV RBC AUTO: 91.9 FL (ref 81.4–97.8)
MONOCYTES # BLD AUTO: 0.64 K/UL (ref 0–0.85)
MONOCYTES NFR BLD AUTO: 9.3 % (ref 0–13.4)
NEUTROPHILS # BLD AUTO: 3.07 K/UL (ref 2–7.15)
NEUTROPHILS NFR BLD: 44.8 % (ref 44–72)
NRBC # BLD AUTO: 0 K/UL
NRBC BLD-RTO: 0 /100 WBC
PLATELET # BLD AUTO: 328 K/UL (ref 164–446)
PMV BLD AUTO: 10.5 FL (ref 9–12.9)
POTASSIUM BLD-SCNC: 4 MMOL/L (ref 3.6–5.5)
RBC # BLD AUTO: 4.3 M/UL (ref 4.2–5.4)
SODIUM BLD-SCNC: 141 MMOL/L (ref 135–145)
WBC # BLD AUTO: 6.9 K/UL (ref 4.8–10.8)

## 2018-04-03 PROCEDURE — 84132 ASSAY OF SERUM POTASSIUM: CPT

## 2018-04-03 PROCEDURE — 86140 C-REACTIVE PROTEIN: CPT

## 2018-04-03 PROCEDURE — 36415 COLL VENOUS BLD VENIPUNCTURE: CPT

## 2018-04-03 PROCEDURE — 85025 COMPLETE CBC W/AUTO DIFF WBC: CPT

## 2018-04-03 PROCEDURE — 82565 ASSAY OF CREATININE: CPT

## 2018-04-03 PROCEDURE — 84295 ASSAY OF SERUM SODIUM: CPT

## 2018-04-03 PROCEDURE — 84520 ASSAY OF UREA NITROGEN: CPT

## 2018-04-03 PROCEDURE — 82374 ASSAY BLOOD CARBON DIOXIDE: CPT

## 2018-04-03 PROCEDURE — 82435 ASSAY OF BLOOD CHLORIDE: CPT

## 2018-04-03 PROCEDURE — 82947 ASSAY GLUCOSE BLOOD QUANT: CPT

## 2018-04-03 PROCEDURE — 85652 RBC SED RATE AUTOMATED: CPT

## 2018-04-03 NOTE — TELEPHONE ENCOUNTER
MEDICATION PRIOR AUTHORIZATION NEEDED:    1. Name of Medication: hydrocortisone (ANUSOL-HC) 25 MG Suppos    2. Requested By (Name of Pharmacy): [facility]     3. Is insurance on file current? Yes      4. What is the name & phone number of the 3rd party payor? CoverMyMeds:    Key: [id number]    Patient last name: Stuart VEGA 1955

## 2018-04-04 ENCOUNTER — PATIENT MESSAGE (OUTPATIENT)
Dept: MEDICAL GROUP | Facility: PHYSICIAN GROUP | Age: 63
End: 2018-04-04

## 2018-04-04 DIAGNOSIS — K62.5 BRIGHT RED BLOOD PER RECTUM: ICD-10-CM

## 2018-04-04 DIAGNOSIS — K92.1 BLOODY STOOL: ICD-10-CM

## 2018-04-04 NOTE — TELEPHONE ENCOUNTER
FINAL PRIOR AUTHORIZATION STATUS:    1.  Name of Medication & Dose: hydrocortisone (ANUSOL-HC) 25 MG Suppos     2. Prior Auth Status: Denied.  Reason: Plan Exclusion    3. Action Taken: Pharmacy Notified: N\A Patient Notified: N\A

## 2018-04-06 ENCOUNTER — TELEPHONE (OUTPATIENT)
Dept: MEDICAL GROUP | Facility: PHYSICIAN GROUP | Age: 63
End: 2018-04-06

## 2018-04-06 RX ORDER — CIPROFLOXACIN 500 MG/1
500 TABLET, FILM COATED ORAL 2 TIMES DAILY
Qty: 14 TAB | Refills: 0 | Status: SHIPPED | OUTPATIENT
Start: 2018-04-06 | End: 2018-08-17

## 2018-04-06 RX ORDER — AMOXICILLIN AND CLAVULANATE POTASSIUM 875; 125 MG/1; MG/1
1 TABLET, FILM COATED ORAL 2 TIMES DAILY
Qty: 14 TAB | Refills: 0 | Status: SHIPPED | OUTPATIENT
Start: 2018-04-06 | End: 2018-08-17

## 2018-04-06 RX ORDER — CIPROFLOXACIN 500 MG/1
500 TABLET, FILM COATED ORAL 2 TIMES DAILY
Qty: 14 TAB | Refills: 0 | Status: CANCELLED | OUTPATIENT
Start: 2018-04-06

## 2018-04-06 NOTE — TELEPHONE ENCOUNTER
The patient has informed me that she cannot get in touch with GI for my referral as I highly suspect hemorrhoids. She does still have bright red blood in her stool. She is going to get the stool studies to me tomorrow. Still denies any pain, but due to the bright red blood in the stool and LLQ pain on exam with history of diverticulosis, I am going to prophylactically treat for diverticulitis. I will get in touch with her Monday to follow up    JEANNE Urena.

## 2018-04-06 NOTE — TELEPHONE ENCOUNTER
Please call patient to tell her the two antibiotics have been sent to pharmacy. If she notices symptoms of lower blood sugars, please stop glyburide for 7 days while on antibiotics as cipro and glyburide can make sugars lower.     JEANNE Urena.            Patient informed me she could not get in touch with GI. She will try again on Monday. I am going to treat her for diverticulitis that she has history of diverticulosis and colonoscopy, LLQ tenderness on exam, and bright red blood in the stool. She will continue to call to follow up with GI for my suspicion of internal bleeding hemorrhoids. She will do stool studies Saturday.     JEANNE Urena.

## 2018-04-06 NOTE — TELEPHONE ENCOUNTER
Medication hydrocortisone (ANUSOL-HC) 25 MG Suppos has been denied for coverage.    Please send in a new Rx for different medication.

## 2018-04-07 ENCOUNTER — HOSPITAL ENCOUNTER (OUTPATIENT)
Facility: MEDICAL CENTER | Age: 63
End: 2018-04-07
Attending: NURSE PRACTITIONER
Payer: COMMERCIAL

## 2018-04-07 DIAGNOSIS — K62.5 BRIGHT RED RECTAL BLEEDING: ICD-10-CM

## 2018-04-07 LAB
C DIFF DNA SPEC QL NAA+PROBE: NEGATIVE
C DIFF TOX GENS STL QL NAA+PROBE: NEGATIVE
WBC STL QL MICRO: NORMAL

## 2018-04-07 PROCEDURE — 87045 FECES CULTURE AEROBIC BACT: CPT

## 2018-04-07 PROCEDURE — 87899 AGENT NOS ASSAY W/OPTIC: CPT

## 2018-04-07 PROCEDURE — 89055 LEUKOCYTE ASSESSMENT FECAL: CPT

## 2018-04-07 PROCEDURE — 83993 ASSAY FOR CALPROTECTIN FECAL: CPT

## 2018-04-07 PROCEDURE — 87046 STOOL CULTR AEROBIC BACT EA: CPT

## 2018-04-07 PROCEDURE — 87493 C DIFF AMPLIFIED PROBE: CPT

## 2018-04-08 LAB
E COLI SXT1+2 STL IA: NORMAL
SIGNIFICANT IND 70042: NORMAL
SITE SITE: NORMAL
SOURCE SOURCE: NORMAL

## 2018-04-10 LAB
BACTERIA STL CULT: NORMAL
CALPROTECTIN STL-MCNT: 105 UG/G
E COLI SXT1+2 STL IA: NORMAL
SIGNIFICANT IND 70042: NORMAL
SITE SITE: NORMAL
SOURCE SOURCE: NORMAL

## 2018-08-17 ENCOUNTER — OFFICE VISIT (OUTPATIENT)
Dept: MEDICAL GROUP | Facility: PHYSICIAN GROUP | Age: 63
End: 2018-08-17
Payer: COMMERCIAL

## 2018-08-17 ENCOUNTER — PATIENT MESSAGE (OUTPATIENT)
Dept: MEDICAL GROUP | Facility: PHYSICIAN GROUP | Age: 63
End: 2018-08-17

## 2018-08-17 VITALS
HEIGHT: 67 IN | SYSTOLIC BLOOD PRESSURE: 134 MMHG | DIASTOLIC BLOOD PRESSURE: 88 MMHG | TEMPERATURE: 98.8 F | WEIGHT: 212 LBS | HEART RATE: 85 BPM | OXYGEN SATURATION: 97 % | BODY MASS INDEX: 33.27 KG/M2

## 2018-08-17 DIAGNOSIS — E11.9 TYPE 2 DIABETES MELLITUS WITHOUT COMPLICATION, WITHOUT LONG-TERM CURRENT USE OF INSULIN (HCC): ICD-10-CM

## 2018-08-17 DIAGNOSIS — R35.1 NOCTURIA MORE THAN TWICE PER NIGHT: ICD-10-CM

## 2018-08-17 DIAGNOSIS — I10 ESSENTIAL HYPERTENSION: ICD-10-CM

## 2018-08-17 DIAGNOSIS — S91.311A LACERATION OF RIGHT FOOT, INITIAL ENCOUNTER: ICD-10-CM

## 2018-08-17 PROBLEM — K62.5 BRIGHT RED RECTAL BLEEDING: Status: RESOLVED | Noted: 2018-04-02 | Resolved: 2018-08-17

## 2018-08-17 PROBLEM — B02.9 HERPES ZOSTER WITHOUT COMPLICATION: Status: RESOLVED | Noted: 2017-11-06 | Resolved: 2018-08-17

## 2018-08-17 PROBLEM — M25.512 ACUTE PAIN OF LEFT SHOULDER: Status: RESOLVED | Noted: 2017-12-13 | Resolved: 2018-08-17

## 2018-08-17 PROCEDURE — 99214 OFFICE O/P EST MOD 30 MIN: CPT | Performed by: NURSE PRACTITIONER

## 2018-08-17 ASSESSMENT — PATIENT HEALTH QUESTIONNAIRE - PHQ9: CLINICAL INTERPRETATION OF PHQ2 SCORE: 0

## 2018-08-17 NOTE — PROGRESS NOTES
Subjective:     Chief Complaint   Patient presents with   • Laceration     right foot [id number]   • Urinary Frequency     getting up mult/times at night       HPI  Brenda Davidson is a 63 y.o. female here today for foot injury. She additionally has chronic medical conditions that she has not been following up on.    She lacerated her right foot by stepping on a ladder 3 weeks ago. She was not evaluated at the time. There was an open wound and she treated this on her own at home. She says the wound has closed, but there continues to be ongoing severe pain in the bottom of the foot. There is no erythema, edema, or purulent drainage.    She has urinary frequency during nighttime only for the past few years. Going about 3-4 times night, moderate-large amount of urine produced. No frequency during the day. She hardly goes at all during the day. No dysuria, foul odor, hematuria. There is no LE during the day.  She does have diabetes and eats dinner about 3 hours before bed. She has not had any labs to monitor diabetes now for about 2 years. She feels well.     Essential hypertension  Long-standing problem currently managed with losartan and cartia. Blood pressure generally remains 130s/70s-80s. Due for annual labs    Type 2 diabetes mellitus without complication  Ongoing chronic problem that has not been followed up on as recommended by patient. Last A1c was 2 years ago. She continues to use metformin and glyburide       Diagnoses of Laceration of right foot, initial encounter, Essential hypertension, Type 2 diabetes mellitus without complication, without long-term current use of insulin (HCC), and Nocturia more than twice per night were pertinent to this visit.    Allergies: Codeine; Ace inhibitors; and Flagyl [metronidazole]  Current medicines (including changes today)  Current Outpatient Prescriptions   Medication Sig Dispense Refill   • atorvastatin (LIPITOR) 40 MG Tab TAKE ONE TABLET BY MOUTH EVERY EVENING 90 Tab  "2   • glyBURIDE (DIABETA) 5 MG Tab TAKE ONE TABLET BY MOUTH EVERY MORNING WITH BREAKFAST 90 Tab 2   • metformin (GLUCOPHAGE) 1000 MG tablet TAKE ONE TABLET BY MOUTH TWICE A DAY WITH MEALS 180 Tab 1   • CARTIA  MG CAPSULE SR 24 HR TAKE ONE CAPSULE BY MOUTH DAILY 90 Cap 2   • losartan (COZAAR) 100 MG Tab TAKE ONE TABLET BY MOUTH DAILY 90 Tab 2   • ibuprofen (MOTRIN) 800 MG Tab Take 1 Tab by mouth every 8 hours as needed. 30 Tab 1   • Blood Glucose Monitoring Suppl SUPPLIES MISC Test strips order: Test strips for Accu Check Compact meter. Sig: use once daily before breakfast and prn ssx high or low sugar #150 RF x 3 150 Strip 5   • tetanus-dipth-acell pertussis (ADACEL) 5-2-15.5 LF-MCG/0.5 Suspension 0.5 mL by Intramuscular route Once PRN for up to 1 dose. 0.5 mL 0   • albuterol 108 (90 Base) MCG/ACT Aero Soln inhalation aerosol Inhale 2 Puffs by mouth every four hours as needed for Shortness of Breath. 1 Inhaler 0     No current facility-administered medications for this visit.        She  has a past medical history of Anesthesia; Chronic low back pain; Cold (3/29/12); Dental disorder; DM (diabetes mellitus) ([facility]); Hypercholesteremia; Hypertension; Mass of cecum (9/15); and Other specified disorder of intestines. She also has no past medical history of Breast cancer ([facility]).      ROS  As stated in HPI and additionally  Gen: No F/C  Neuro: No HA or dizziness  CV: +nocturia. No chest pain, PND, or LE edema  Endo: No polyuria or polydipsia  : see HPI      Objective:     Blood pressure 134/88, pulse 85, temperature 37.1 °C (98.8 °F), height 1.702 m (5' 7\"), weight 96.2 kg (212 lb), SpO2 97 %. Body mass index is 33.2 kg/m².  Physical Exam:  General: Alert, oriented, in no acute distress.  Eye contact is good, speech goal directed, affect calm  CNs grossly intact.  HEENT: conjunctiva non-injected, sclera non-icteric, EOMs intact  Gross hearing intact.  Lungs: unlabored. clear to auscultation bilaterally with good " excursion.  CV: regular rate and rhythm.  Ext: no edema, normal color and temperature.   Skin: No rashes or lesions in visible areas  MSK: bottom of right foot directly in center with closed puncture with and significant TTP within 0.5 cm around site. No erythema, edema, warmth, or drainage  Gait steady.     Assessment and Plan:   Assessment/Plan:  1. Laceration of right foot, initial encounter  Check x-ray. She will get TDAP at pharmacy. Consider MRI d/t severity of pain and concern for tendon involvement  - CBC WITH DIFFERENTIAL; Future  - DX-FOOT-COMPLETE 3+ RIGHT; Future    2. Essential hypertension  Stable. Continue current meds check labs f/u with PCP  - COMP METABOLIC PANEL; Future  - LIPID PROFILE; Future  - TSH WITH REFLEX TO FT4; Future    3. Type 2 diabetes mellitus without complication, without long-term current use of insulin (HCC)  Status unk. Check labs  - LIPID PROFILE; Future  - HEMOGLOBIN A1C; Future  - MICROALBUMIN CREAT RATIO URINE; Future    4. Nocturia more than twice per night  Check UA. F/u with PCP  - URINALYSIS,CULTURE IF INDICATED; Future       Follow up:  Return pending labs.    Educated in proper administration of medication(s) ordered today including safety, possible SE, risks, benefits, rationale and alternatives to therapy.   Supportive care, differential diagnoses, and indications for immediate follow-up discussed with patient.    Pathogenesis of diagnosis discussed including typical length and natural progression.    Instructed to return to clinic or nearest emergency department for any change in condition, further concerns, or worsening of symptoms.  Patient states understanding of the plan of care and discharge instructions.      Please note that this dictation was created using voice recognition software. I have made every reasonable attempt to correct obvious errors, but I expect that there are errors of grammar and possibly content that I did not discover before finalizing the  note.    Followup: Return pending labs. sooner should new symptoms or problems arise.

## 2018-08-18 ENCOUNTER — HOSPITAL ENCOUNTER (OUTPATIENT)
Dept: RADIOLOGY | Facility: MEDICAL CENTER | Age: 63
End: 2018-08-18
Attending: NURSE PRACTITIONER
Payer: COMMERCIAL

## 2018-08-18 DIAGNOSIS — S91.311A LACERATION OF RIGHT FOOT, INITIAL ENCOUNTER: ICD-10-CM

## 2018-08-18 PROCEDURE — 73630 X-RAY EXAM OF FOOT: CPT | Mod: RT

## 2018-08-20 ENCOUNTER — PATIENT MESSAGE (OUTPATIENT)
Dept: MEDICAL GROUP | Facility: PHYSICIAN GROUP | Age: 63
End: 2018-08-20

## 2018-08-20 DIAGNOSIS — S99.921A INJURY OF RIGHT FOOT, INITIAL ENCOUNTER: ICD-10-CM

## 2018-08-20 DIAGNOSIS — Z23 NEED FOR VACCINATION: ICD-10-CM

## 2018-08-20 NOTE — TELEPHONE ENCOUNTER
From: Brenda Davidson  To: FILI Urena  Sent: 8/17/2018 5:37 PM PDT  Subject: RE:(No subject)    I will come in ASAP to get the xray,i can get my shot at [facility] in [facility],If it is a torn tendon what will be next? Thank you for your message   ----- Message -----  From: FILI Urena  Sent: 8/17/2018 4:58 PM PDT  To: Brenda Davidson  Subject: (No subject)  Jeremias Gutierrez,    MRI is recommended to evaluate the tendon in your foot, but your insurance will not cover this without x-ray first. This unfortunately will not give us any information, but is part of the process. Come in ASAP to get the x-ray. Open today until 7, Saturday and Sunday 8-5. Once x-ray is resulted, I can order MRI. Dr. Barboza does also want you to get a Tetanus vaccine even though it was plastic. We will be here tonight until 5:30 pm if you want to come in, otherwise anyone can give you the vaccine at your pharmacy I can send Rx over now for you.     FILI Urena

## 2018-08-22 NOTE — ASSESSMENT & PLAN NOTE
Long-standing problem currently managed with losartan and cartia. Blood pressure generally remains 130s/70s-80s. Due for annual labs

## 2018-08-22 NOTE — ASSESSMENT & PLAN NOTE
Ongoing chronic problem that has not been followed up on as recommended by patient. Last A1c was 2 years ago. She continues to use metformin and glyburide

## 2018-09-06 DIAGNOSIS — N81.10 VAGINAL PROLAPSE: ICD-10-CM

## 2018-09-11 ENCOUNTER — HOSPITAL ENCOUNTER (OUTPATIENT)
Dept: LAB | Facility: MEDICAL CENTER | Age: 63
End: 2018-09-11
Attending: NURSE PRACTITIONER
Payer: COMMERCIAL

## 2018-09-11 DIAGNOSIS — E11.9 TYPE 2 DIABETES MELLITUS WITHOUT COMPLICATION, WITHOUT LONG-TERM CURRENT USE OF INSULIN (HCC): ICD-10-CM

## 2018-09-11 DIAGNOSIS — I10 ESSENTIAL HYPERTENSION: ICD-10-CM

## 2018-09-11 DIAGNOSIS — R35.1 NOCTURIA MORE THAN TWICE PER NIGHT: ICD-10-CM

## 2018-09-11 DIAGNOSIS — S91.311A LACERATION OF RIGHT FOOT, INITIAL ENCOUNTER: ICD-10-CM

## 2018-09-11 LAB
ALBUMIN SERPL BCP-MCNC: 4.1 G/DL (ref 3.2–4.9)
ALBUMIN/GLOB SERPL: 1.2 G/DL
ALP SERPL-CCNC: 53 U/L (ref 30–99)
ALT SERPL-CCNC: 37 U/L (ref 2–50)
ANION GAP SERPL CALC-SCNC: 9 MMOL/L (ref 0–11.9)
APPEARANCE UR: CLEAR
AST SERPL-CCNC: 23 U/L (ref 12–45)
BACTERIA #/AREA URNS HPF: NEGATIVE /HPF
BASOPHILS # BLD AUTO: 1 % (ref 0–1.8)
BASOPHILS # BLD: 0.07 K/UL (ref 0–0.12)
BILIRUB SERPL-MCNC: 0.6 MG/DL (ref 0.1–1.5)
BILIRUB UR QL STRIP.AUTO: NEGATIVE
BUN SERPL-MCNC: 31 MG/DL (ref 8–22)
CALCIUM SERPL-MCNC: 9.8 MG/DL (ref 8.5–10.5)
CHLORIDE SERPL-SCNC: 108 MMOL/L (ref 96–112)
CHOLEST SERPL-MCNC: 166 MG/DL (ref 100–199)
CO2 SERPL-SCNC: 23 MMOL/L (ref 20–33)
COLOR UR: YELLOW
CREAT SERPL-MCNC: 1.13 MG/DL (ref 0.5–1.4)
CREAT UR-MCNC: 113 MG/DL
EOSINOPHIL # BLD AUTO: 0.31 K/UL (ref 0–0.51)
EOSINOPHIL NFR BLD: 4.4 % (ref 0–6.9)
EPI CELLS #/AREA URNS HPF: ABNORMAL /HPF
ERYTHROCYTE [DISTWIDTH] IN BLOOD BY AUTOMATED COUNT: 50.3 FL (ref 35.9–50)
EST. AVERAGE GLUCOSE BLD GHB EST-MCNC: 217 MG/DL
FASTING STATUS PATIENT QL REPORTED: NORMAL
GLOBULIN SER CALC-MCNC: 3.5 G/DL (ref 1.9–3.5)
GLUCOSE SERPL-MCNC: 192 MG/DL (ref 65–99)
GLUCOSE UR STRIP.AUTO-MCNC: NEGATIVE MG/DL
HBA1C MFR BLD: 9.2 % (ref 0–5.6)
HCT VFR BLD AUTO: 39.6 % (ref 37–47)
HDLC SERPL-MCNC: 45 MG/DL
HGB BLD-MCNC: 12.6 G/DL (ref 12–16)
HYALINE CASTS #/AREA URNS LPF: ABNORMAL /LPF
IMM GRANULOCYTES # BLD AUTO: 0.01 K/UL (ref 0–0.11)
IMM GRANULOCYTES NFR BLD AUTO: 0.1 % (ref 0–0.9)
KETONES UR STRIP.AUTO-MCNC: NEGATIVE MG/DL
LDLC SERPL CALC-MCNC: 87 MG/DL
LEUKOCYTE ESTERASE UR QL STRIP.AUTO: ABNORMAL
LYMPHOCYTES # BLD AUTO: 2.72 K/UL (ref 1–4.8)
LYMPHOCYTES NFR BLD: 38.2 % (ref 22–41)
MCH RBC QN AUTO: 29 PG (ref 27–33)
MCHC RBC AUTO-ENTMCNC: 31.8 G/DL (ref 33.6–35)
MCV RBC AUTO: 91.2 FL (ref 81.4–97.8)
MICRO URNS: ABNORMAL
MICROALBUMIN UR-MCNC: 1.6 MG/DL
MICROALBUMIN/CREAT UR: 14 MG/G (ref 0–30)
MONOCYTES # BLD AUTO: 0.63 K/UL (ref 0–0.85)
MONOCYTES NFR BLD AUTO: 8.8 % (ref 0–13.4)
NEUTROPHILS # BLD AUTO: 3.38 K/UL (ref 2–7.15)
NEUTROPHILS NFR BLD: 47.5 % (ref 44–72)
NITRITE UR QL STRIP.AUTO: NEGATIVE
NRBC # BLD AUTO: 0 K/UL
NRBC BLD-RTO: 0 /100 WBC
PH UR STRIP.AUTO: 6 [PH]
PLATELET # BLD AUTO: 301 K/UL (ref 164–446)
PMV BLD AUTO: 10.7 FL (ref 9–12.9)
POTASSIUM SERPL-SCNC: 4.4 MMOL/L (ref 3.6–5.5)
PROT SERPL-MCNC: 7.6 G/DL (ref 6–8.2)
PROT UR QL STRIP: NEGATIVE MG/DL
RBC # BLD AUTO: 4.34 M/UL (ref 4.2–5.4)
RBC # URNS HPF: ABNORMAL /HPF
RBC UR QL AUTO: NEGATIVE
SODIUM SERPL-SCNC: 140 MMOL/L (ref 135–145)
SP GR UR STRIP.AUTO: 1.02
TRIGL SERPL-MCNC: 170 MG/DL (ref 0–149)
TSH SERPL DL<=0.005 MIU/L-ACNC: 3.52 UIU/ML (ref 0.38–5.33)
UROBILINOGEN UR STRIP.AUTO-MCNC: 0.2 MG/DL
WBC # BLD AUTO: 7.1 K/UL (ref 4.8–10.8)
WBC #/AREA URNS HPF: ABNORMAL /HPF

## 2018-09-11 PROCEDURE — 84443 ASSAY THYROID STIM HORMONE: CPT

## 2018-09-11 PROCEDURE — 83036 HEMOGLOBIN GLYCOSYLATED A1C: CPT

## 2018-09-11 PROCEDURE — 36415 COLL VENOUS BLD VENIPUNCTURE: CPT

## 2018-09-11 PROCEDURE — 85025 COMPLETE CBC W/AUTO DIFF WBC: CPT

## 2018-09-11 PROCEDURE — 82570 ASSAY OF URINE CREATININE: CPT

## 2018-09-11 PROCEDURE — 80053 COMPREHEN METABOLIC PANEL: CPT

## 2018-09-11 PROCEDURE — 80061 LIPID PANEL: CPT

## 2018-09-11 PROCEDURE — 82043 UR ALBUMIN QUANTITATIVE: CPT

## 2018-09-11 PROCEDURE — 81001 URINALYSIS AUTO W/SCOPE: CPT

## 2018-10-03 ENCOUNTER — HOSPITAL ENCOUNTER (OUTPATIENT)
Dept: RADIOLOGY | Facility: MEDICAL CENTER | Age: 63
End: 2018-10-03
Attending: NURSE PRACTITIONER
Payer: COMMERCIAL

## 2018-10-03 ENCOUNTER — OFFICE VISIT (OUTPATIENT)
Dept: URGENT CARE | Facility: PHYSICIAN GROUP | Age: 63
End: 2018-10-03
Payer: COMMERCIAL

## 2018-10-03 VITALS
DIASTOLIC BLOOD PRESSURE: 92 MMHG | SYSTOLIC BLOOD PRESSURE: 136 MMHG | RESPIRATION RATE: 16 BRPM | OXYGEN SATURATION: 93 % | WEIGHT: 209 LBS | BODY MASS INDEX: 32.8 KG/M2 | HEIGHT: 67 IN | TEMPERATURE: 98.1 F | HEART RATE: 88 BPM

## 2018-10-03 DIAGNOSIS — M25.511 ACUTE PAIN OF RIGHT SHOULDER: ICD-10-CM

## 2018-10-03 DIAGNOSIS — M79.601 PAIN OF RIGHT UPPER EXTREMITY: ICD-10-CM

## 2018-10-03 PROCEDURE — 93000 ELECTROCARDIOGRAM COMPLETE: CPT | Performed by: NURSE PRACTITIONER

## 2018-10-03 PROCEDURE — 73080 X-RAY EXAM OF ELBOW: CPT | Mod: RT

## 2018-10-03 PROCEDURE — 99214 OFFICE O/P EST MOD 30 MIN: CPT | Performed by: NURSE PRACTITIONER

## 2018-10-03 PROCEDURE — 73030 X-RAY EXAM OF SHOULDER: CPT | Mod: RT

## 2018-10-03 RX ORDER — HYDROCODONE BITARTRATE AND ACETAMINOPHEN 5; 325 MG/1; MG/1
1-2 TABLET ORAL EVERY 6 HOURS PRN
Qty: 10 TAB | Refills: 0 | Status: SHIPPED | OUTPATIENT
Start: 2018-10-03 | End: 2018-10-11

## 2018-10-03 RX ORDER — PREDNISONE 10 MG/1
20 TABLET ORAL 2 TIMES DAILY
Qty: 20 TAB | Refills: 0 | Status: CANCELLED | OUTPATIENT
Start: 2018-10-03 | End: 2018-10-08

## 2018-10-03 NOTE — LETTER
October 3, 2018         Patient: Brenda Davidson   YOB: 1955   Date of Visit: 10/3/2018           To Whom it May Concern:    Brenda Davidson was seen in my clinic on 10/3/2018. She may be excused from work for three days.     If you have any questions or concerns, please don't hesitate to call.        Sincerely,           JEANNE Mccracken.  Electronically Signed

## 2018-10-03 NOTE — PROGRESS NOTES
Chief Complaint   Patient presents with   • Arm Pain     R arm, [id number]        HISTORY OF PRESENT ILLNESS: Patient is a 63 y.o. female who presents to urgent care today with complaints of right shoulder and arm pain. She awoke on Monday with her symptoms. She has had constant pain since. The pain is exacerbated by movement, is having limited range of motrin. Admits to intermittent tingling to 3/4/5th digits of right arm. Also reports some nausea with the pain, right sided neck, and upper back pain. She has taken advil and using heat for symptom relief. Denies chest pain, fever, chills, malaise, or shortness of breath. Denies any injury or trauma precipitating pain. She is diabetic and her BS have been in the upper 100s.     Patient Active Problem List    Diagnosis Date Noted   • Right foot pain 03/01/2016   • Type 2 diabetes mellitus without complication (HCC) 03/01/2016   • Essential hypertension 03/01/2016   • Dyslipidemia 06/22/2015   • Type II or unspecified type diabetes mellitus without mention of complication, uncontrolled 10/27/2014   • Chronic low back pain        Allergies:Codeine; Ace inhibitors; and Flagyl [metronidazole]    Current Outpatient Prescriptions Ordered in [facility]   Medication Sig Dispense Refill   • atorvastatin (LIPITOR) 40 MG Tab TAKE ONE TABLET BY MOUTH EVERY EVENING 90 Tab 2   • glyBURIDE (DIABETA) 5 MG Tab TAKE ONE TABLET BY MOUTH EVERY MORNING WITH BREAKFAST 90 Tab 2   • metformin (GLUCOPHAGE) 1000 MG tablet TAKE ONE TABLET BY MOUTH TWICE A DAY WITH MEALS 180 Tab 1   • CARTIA  MG CAPSULE SR 24 HR TAKE ONE CAPSULE BY MOUTH DAILY 90 Cap 2   • losartan (COZAAR) 100 MG Tab TAKE ONE TABLET BY MOUTH DAILY 90 Tab 2   • Blood Glucose Monitoring Suppl SUPPLIES MISC Test strips order: Test strips for Accu Check Compact meter. Sig: use once daily before breakfast and prn ssx high or low sugar #150 RF x 3 150 Strip 5   • tetanus-dipth-acell pertussis (ADACEL) 5-2-15.5 LF-MCG/0.5  Suspension 0.5 mL by Intramuscular route Once PRN for up to 1 dose. 0.5 mL 0   • albuterol 108 (90 Base) MCG/ACT Aero Soln inhalation aerosol Inhale 2 Puffs by mouth every four hours as needed for Shortness of Breath. 1 Inhaler 0   • ibuprofen (MOTRIN) 800 MG Tab Take 1 Tab by mouth every 8 hours as needed. 30 Tab 1     No current Epic-ordered facility-administered medications on file.        Past Medical History:   Diagnosis Date   • Anesthesia     hard time waking up   • Chronic low back pain    • Cold 3/29/12    COLD   • Dental disorder    • DM (diabetes mellitus) (HCC)     oral medication   • Hypercholesteremia    • Hypertension    • Mass of cecum 9/15    tubulovillous adenoma polyp   • Other specified disorder of intestines        Social History   Substance Use Topics   • Smoking status: Never Smoker   • Smokeless tobacco: Never Used   • Alcohol use No       Family Status   Relation Status   • Mo Alive   • Fa Alive     Family History   Problem Relation Age of Onset   • Cancer Mother         breast cancer       ROS:  Review of Systems   Constitutional: Negative for fever, chills, weight loss, malaise, and fatigue.   HENT: Negative for ear pain, nosebleeds, congestion, sore throat and neck pain.    Eyes: Negative for vision changes.   Neuro: Negative for headache, sensory changes, weakness, seizure, LOC.   Cardiovascular: Negative for chest pain, palpitations, orthopnea and leg swelling.   Respiratory: Negative for cough, sputum production, shortness of breath and wheezing.   Gastrointestinal: Negative for abdominal pain, nausea, vomiting or diarrhea.   Genitourinary: Negative for dysuria, urgency and frequency.  Musculoskeletal: Positive for right shoulder pain, radiating down right arm. Negative for falls, neck pain, back pain, joint pain, myalgias.   Skin: Negative for rash, diaphoresis.     Exam:  Blood pressure 136/92, pulse 88, temperature 36.7 °C (98.1 °F), temperature source Temporal, resp. rate 16,  "height 1.702 m (5' 7\"), weight 94.8 kg (209 lb), SpO2 93 %, not currently breastfeeding.  General: well-nourished, well-developed female in NAD  Head: normocephalic, atraumatic  Eyes: PERRLA, no conjunctival injection, acuity grossly intact, lids normal.  Ears: normal shape and symmetry, no tenderness, no discharge. External canals are without any significant edema or erythema. Tympanic membranes are without any inflammation, no effusion. Gross auditory acuity is intact.  Nose: symmetrical without tenderness, no discharge.  Mouth/Throat: reasonable hygiene, no erythema, exudates or tonsillar enlargement.  Neck: no masses, range of motion within normal limits, no tracheal deviation. No obvious thyroid enlargement.   Lymph: no cervical adenopathy. No supraclavicular adenopathy.   Neuro: alert and oriented. Cranial nerves 1-12 grossly intact. No sensory deficit.   Cardiovascular: regular rate and rhythm. No edema.  Pulmonary: no distress. Chest is symmetrical with respiration, no wheezes, crackles, or rhonchi.   Musculoskeletal: no clubbing, appropriate muscle tone, gait is stable. Right shoulder tenderness over deltoid, limited ROM in all directions, no deformity, bilateral arm strength 4/5. N/V intact.   Skin: warm, dry, intact, no clubbing, no cyanosis, no rashes.   Psych: appropriate mood, affect, judgement.         Assessment/Plan:  1. Acute pain of right shoulder  DX-SHOULDER 2+ RIGHT           DX shoulder reviewed by myself, radiology reading \"There is no evidence of acute fracture.  Mild osteoarthritis is noted at the AC joint.\"    DX elbow reviewed by myself, radiology reading \"No evidence of fracture or dislocation.\"      EKG Interpretation   Interpreted by me   Rhythm: normal sinus   Rate: normal   Axis: normal   Ectopy: none   Conduction: normal   ST Segments: no acute change   T Waves: borderline T waves in V2, V3  Clinical Impression: no acute ST changes, no acute changes to ECG from 8/2015        X-rays " are negative, pain is reproducible with movement, and EKG is without significant changes or ST elevation leading to believe cardiac etiology, suspect shoulder origin such as adhesive capsulitis. RICE. Norco for pain, sedative effects of medication discussed with patient, consent signed. Referral placed to ortho. [facility] Aware web site evaluation: I have obtained and reviewed patient utilization report from [facility] pharmacy database prior to writing prescription for controlled substance II, III or IV per [place] bill . Based on the report and my clinical assessment the prescription is medically necessary.   Supportive care, differential diagnoses, and indications for immediate follow-up discussed with patient.   Pathogenesis of diagnosis discussed including typical length and natural progression.   Instructed to return to clinic or nearest emergency department for any change in condition, further concerns, or worsening of symptoms.  Patient states understanding of the plan of care and discharge instructions.  Instructed to make an appointment, for follow up, with her primary care provider.        Please note that this dictation was created using voice recognition software. I have made every reasonable attempt to correct obvious errors, but I expect that there are errors of grammar and possibly content that I did not discover before finalizing the note.      JEANNE Mccracken.

## 2018-10-23 ENCOUNTER — HOSPITAL ENCOUNTER (OUTPATIENT)
Facility: MEDICAL CENTER | Age: 63
End: 2018-10-23
Attending: OBSTETRICS & GYNECOLOGY
Payer: COMMERCIAL

## 2018-10-23 ENCOUNTER — GYNECOLOGY VISIT (OUTPATIENT)
Dept: OBGYN | Facility: CLINIC | Age: 63
End: 2018-10-23
Payer: COMMERCIAL

## 2018-10-23 VITALS
HEIGHT: 67 IN | DIASTOLIC BLOOD PRESSURE: 112 MMHG | BODY MASS INDEX: 33.27 KG/M2 | WEIGHT: 212 LBS | SYSTOLIC BLOOD PRESSURE: 166 MMHG

## 2018-10-23 DIAGNOSIS — N95.2 VAGINAL ATROPHY: ICD-10-CM

## 2018-10-23 DIAGNOSIS — N81.10 VAGINAL PROLAPSE: ICD-10-CM

## 2018-10-23 PROCEDURE — 88175 CYTOPATH C/V AUTO FLUID REDO: CPT

## 2018-10-23 PROCEDURE — 99204 OFFICE O/P NEW MOD 45 MIN: CPT | Performed by: OBSTETRICS & GYNECOLOGY

## 2018-10-23 PROCEDURE — 87624 HPV HI-RISK TYP POOLED RSLT: CPT

## 2018-10-23 RX ORDER — ATORVASTATIN CALCIUM 20 MG/1
20 TABLET, FILM COATED ORAL NIGHTLY
COMMUNITY
End: 2018-10-31

## 2018-10-23 NOTE — PROGRESS NOTES
Brenda Davidson is a 63 y.o.  who presents due to concerns about vaginal prolapse.  She reports that for the past few months she has noticed a ball between her legs when she bends over and she occasionally feels a lump when she wipes.  Denies any urinary incontinence, pelvic pain, abnormal vaginal discharge, or bowel complaints.    GYN History:  No LMP recorded. Patient is postmenopausal.. Menopause age 49.  Menarche @14.  Menses regular, lasting 5 days, not particularly heavy or painful.  Last pap about 6 years ago, no h/o abnormal pap, nor history of cone biopsy, LEEP or any other cervical, uterine or gynecologic surgery. No history of sexually transmitted diseases.       OB History    Para Term  AB Living   3 3           SAB TAB Ectopic Molar Multiple Live Births                    # Outcome Date GA Lbr Earl/2nd Weight Sex Delivery Anes PTL Lv   3 Para      Vag-Spont      2 Para      Vag-Spont      1 Para      Vag-Spont             Review of Systems:   Pertinent positives documented in HPI and all other systems reviewed & are negative.    All PMH, PSH, allergies, social history and FH reviewed and updated today:  Past Medical History:   Diagnosis Date   • Anesthesia     hard time waking up   • Chronic low back pain    • Cold 3/29/12    COLD   • Dental disorder    • DM (diabetes mellitus) (HCC)     oral medication   • Hypercholesteremia    • Hypertension    • Mass of cecum 9/15    tubulovillous adenoma polyp   • Other specified disorder of intestines        Past Surgical History:   Procedure Laterality Date   • CHOLECYSTECTOMY  2015    Procedure: LAPAROSCOPIC CHOLECYSTECTOMY;  Surgeon: Kris Garrison M.D.;  Location: [facility];  Service:    • BOWEL RESECTION LAPAROSCOPIC  2015    Procedure: LAPAROSCOPIC ILEOCOLIC RESECTION;  Surgeon: Kris Garrison M.D.;  Location: [facility];  Service:    • COLONOSCOPY WITH POLYP  8/13/15    multiple polyps, tumor cecum,  diverticulosis, int hemorrhoids   • APPENDECTOMY  2015   • ARTHROSCOPY, KNEE  3/14    torn meniscus   • PELVISCOPY  4/6/2012    Performed by HILDA MACE at SURGERY SAME DAY [facility]   • LYSIS ADHESIONS GYN  4/6/2012    Performed by HILDA MACE at SURGERY SAME DAY [facility]   • COLONOSCOPY WITH POLYP  2012    3 polyps - hyperplastic, benign serrated, tubulovillous adenoma , scattered diverticula sigmoid colon - [facility]   • OTHER      TONSILLECTOMY   • OTHER      REPAIR LT URETER AGE 19       Medications:   Current Outpatient Prescriptions Ordered in [facility]   Medication Sig Dispense Refill   • atorvastatin (LIPITOR) 20 MG Tab Take 20 mg by mouth every evening.     • conjugated estrogen (PREMARIN) 0.625 MG/GM Cream Insert 0.5 g in vagina every day. Use daily for one week and then use two times weekly 1 Tube 3   • albuterol 108 (90 Base) MCG/ACT Aero Soln inhalation aerosol Inhale 2 Puffs by mouth every four hours as needed for Shortness of Breath. (Patient not taking: Reported on 10/23/2018) 1 Inhaler 0   • atorvastatin (LIPITOR) 40 MG Tab TAKE ONE TABLET BY MOUTH EVERY EVENING 90 Tab 2   • glyBURIDE (DIABETA) 5 MG Tab TAKE ONE TABLET BY MOUTH EVERY MORNING WITH BREAKFAST 90 Tab 2   • metformin (GLUCOPHAGE) 1000 MG tablet TAKE ONE TABLET BY MOUTH TWICE A DAY WITH MEALS 180 Tab 1   • CARTIA  MG CAPSULE SR 24 HR TAKE ONE CAPSULE BY MOUTH DAILY 90 Cap 2   • losartan (COZAAR) 100 MG Tab TAKE ONE TABLET BY MOUTH DAILY 90 Tab 2   • Blood Glucose Monitoring Suppl SUPPLIES MISC Test strips order: Test strips for Accu Check Compact meter. Sig: use once daily before breakfast and prn ssx high or low sugar #150 RF x 3 150 Strip 5     No current Epic-ordered facility-administered medications on file.        Codeine; Ace inhibitors; and Flagyl [metronidazole]    Social History     Social History   • Marital status: Single     Spouse name: N/A   • Number of children: N/A   • Years of education: N/A     Social  "History Main Topics   • Smoking status: Never Smoker   • Smokeless tobacco: Never Used   • Alcohol use No   • Drug use: No   • Sexual activity: Not Currently      Comment: work: retail.      Other Topics Concern   • Not on file     Social History Narrative   • No narrative on file       Family History   Problem Relation Age of Onset   • Cancer Mother         breast cancer           Objective:   Vital measurements:  Blood pressure (!) 166/112, height 1.702 m (5' 7\"), weight 96.2 kg (212 lb), not currently breastfeeding.  Body mass index is 33.2 kg/m². (Goal BM I>18 <25)    Physical Exam   Nursing note and vitals reviewed.  Physical Exam   Constitutional: She is oriented to person, place, and time and well-developed, well-nourished, and in no distress.   HENT:   Head: Normocephalic and atraumatic.   Eyes: Pupils are equal, round, and reactive to light.   Neck: Normal range of motion. Neck supple.   Cardiovascular: Normal rate and regular rhythm.    Pulmonary/Chest: Effort normal and breath sounds normal.   Abdominal: Soft. Bowel sounds are normal.   Genitourinary: Cervix normal, right adnexa normal and left adnexa normal. No vaginal discharge found.   Musculoskeletal: Normal range of motion. She exhibits no edema.   Neurological: She is alert and oriented to person, place, and time.   Skin: Skin is warm and dry.   Psychiatric: Mood, memory, affect and judgment normal.     Genitourinary: Normal-appearing external female genitalia, vagina is pale pink, moist, with slightly flattened rugae, cervix normal in appearance multiparous without masses or lesions, bulging sidewalls, Pap obtained  POP Q:  Aa: -1 Ba: -1 C: -6  Gh: 4 PB: 3 TVL: 8  Ap: 2 Bp: 2 D: -7        Assessment/Plan:     1. Vaginal atrophy  THINPREP PAP WITH HPV   2. Vaginal prolapse  THINPREP PAP WITH HPV       Brenda Davidson is a 63 y.o.  female who presents for POP   #Pelvic organ prolapse.  Discussed diagnosis with patient today.  Discussed that " this is not not dangerous and she has an option to do nothing.  Other options include physical therapy, cable exercises, vaginal estrogen cream, pessary, and surgical management.  Patient is interested in surgery however her work will be busy throughout the holidays and therefore she would like to wait until after the holiday season.  In the meantime I will prescribed vaginal estrogen cream.  Additionally I have asked that she see her primary care physician to be cleared for surgery as her blood pressure is quite high today.  #Hypertension.  Patient's blood pressure is high in clinic today.  She reports that this often happens when she goes to the doctor but typically her blood pressure is well controlled on her medications.  Discussed importance of having comorbidities well controlled prior to proceeding to the operating room for elective surgery.  #Cervical cancer screening. Pap performed  # Obesity: Diet and exercise discussed as well as finding lifestyle habits that work for patient.  # Follow up in 1-2 months for further management.  Patient will further consider her options but she thinks she will want surgery and if that is the case at the follow-up, will place prior Auth for surgery and evaluate comorbidities.    Patient was seen for 45 minutes face to face of which > 50% of appointment time was spent on counseling and coordination of care regarding the above.

## 2018-10-23 NOTE — PROGRESS NOTES
GYN visit for lump in vagina  WT: 212 lb  BP: 166/112  Last pap: 5 yrs plus   Last mammogram: 2 yrs ago, normal per patient  Good # [phone]

## 2018-10-24 DIAGNOSIS — N95.2 VAGINAL ATROPHY: ICD-10-CM

## 2018-10-24 DIAGNOSIS — N81.10 VAGINAL PROLAPSE: ICD-10-CM

## 2018-10-24 LAB
CYTOLOGY REG CYTOL: NORMAL
HPV HR 12 DNA CVX QL NAA+PROBE: NEGATIVE
HPV16 DNA SPEC QL NAA+PROBE: NEGATIVE
HPV18 DNA SPEC QL NAA+PROBE: NEGATIVE
SPECIMEN SOURCE: NORMAL

## 2018-10-31 ENCOUNTER — OFFICE VISIT (OUTPATIENT)
Dept: MEDICAL GROUP | Facility: PHYSICIAN GROUP | Age: 63
End: 2018-10-31
Payer: COMMERCIAL

## 2018-10-31 VITALS
DIASTOLIC BLOOD PRESSURE: 82 MMHG | SYSTOLIC BLOOD PRESSURE: 136 MMHG | WEIGHT: 215 LBS | HEART RATE: 86 BPM | OXYGEN SATURATION: 99 % | BODY MASS INDEX: 33.74 KG/M2 | HEIGHT: 67 IN | TEMPERATURE: 98.4 F

## 2018-10-31 DIAGNOSIS — M25.512 ACUTE PAIN OF LEFT SHOULDER: ICD-10-CM

## 2018-10-31 DIAGNOSIS — E11.9 TYPE 2 DIABETES MELLITUS WITHOUT COMPLICATION, WITHOUT LONG-TERM CURRENT USE OF INSULIN (HCC): ICD-10-CM

## 2018-10-31 DIAGNOSIS — I10 ESSENTIAL HYPERTENSION: ICD-10-CM

## 2018-10-31 PROCEDURE — 99214 OFFICE O/P EST MOD 30 MIN: CPT | Mod: 25 | Performed by: NURSE PRACTITIONER

## 2018-10-31 PROCEDURE — 20610 DRAIN/INJ JOINT/BURSA W/O US: CPT | Performed by: NURSE PRACTITIONER

## 2018-10-31 RX ORDER — DILTIAZEM HYDROCHLORIDE 240 MG/1
240 CAPSULE, COATED, EXTENDED RELEASE ORAL DAILY
Qty: 30 CAP | Refills: 0 | Status: SHIPPED | OUTPATIENT
Start: 2018-10-31 | End: 2018-11-26 | Stop reason: SDUPTHER

## 2018-10-31 RX ORDER — TRIAMCINOLONE ACETONIDE 40 MG/ML
60 INJECTION, SUSPENSION INTRA-ARTICULAR; INTRAMUSCULAR ONCE
Status: COMPLETED | OUTPATIENT
Start: 2018-10-31 | End: 2018-10-31

## 2018-10-31 RX ORDER — GLYBURIDE 5 MG/1
5 TABLET ORAL 2 TIMES DAILY WITH MEALS
Qty: 180 TAB | Refills: 1 | Status: SHIPPED | OUTPATIENT
Start: 2018-10-31 | End: 2018-12-31 | Stop reason: SDUPTHER

## 2018-10-31 RX ADMIN — TRIAMCINOLONE ACETONIDE 60 MG: 40 INJECTION, SUSPENSION INTRA-ARTICULAR; INTRAMUSCULAR at 08:40

## 2018-10-31 NOTE — ASSESSMENT & PLAN NOTE
Long-standing problem managed with Cartia  mg daily and losartan 100 mg daily. Does not monitor bp at home.     Exercise: no  Nutrition: rarely eats out, but does have processed foods high in carbs and lacking vegetables   Salt: does not add  Caffeine: 1 4 oz c coffee daily   Smoking: none  NSAIDs: advil occasionally     Vitals 8/17/2018 10/3/2018 10/23/2018 10/31/2018   SYSTOLIC 134 136 166 136   DIASTOLIC 88 92 112 82   PULSE 85 88  86

## 2018-10-31 NOTE — PROGRESS NOTES
Subjective:     Chief Complaint   Patient presents with   • Labs Only     lab review   • Hypertension     fv   • Shoulder Pain     left shoulder pain would like shot       HPI  Brenda Davidson is a 63 y.o. female here today for HTN and shoulder pain. She has ongoing left shoulder pain that is worsening over the past 3 months. The pain is anterior shoulder and into lateral shoulder and trapezius. Pain worse at night. Cannot sleep on left side. She did see ortho at [facility] requesting an injection, but he wanted her to do PT for 6 weeks prior to any injection. She states she cannot get the time off to do PT at this time and she would like an injection.     Essential hypertension  Long-standing problem managed with Cartia  mg daily and losartan 100 mg daily. Does not monitor bp at home.     Exercise: no  Nutrition: rarely eats out, but does have processed foods high in carbs and lacking vegetables   Salt: does not add  Caffeine: 1 4 oz c coffee daily   Smoking: none  NSAIDs: advil occasionally     Vitals 8/17/2018 10/3/2018 10/23/2018 10/31/2018   SYSTOLIC 134 136 166 136   DIASTOLIC 88 92 112 82   PULSE 85 88  86       Type 2 diabetes mellitus without complication  Long-standing history of diabetes.  Hemoglobin A1c tested 6 weeks ago and is uncontrolled level of 9.2.  She reports compliance with metformin 1000 mg twice daily and glyburide 5 mg daily.  She has never had diabetes education and does not exercise.    BMI 33.0-33.9,adult  She does not exercise.  Diet is high in carbohydrates and processed foods.  Lacking vegetables, fruits, and whole grains.  Reports she does drink adequate fluid intake       Diagnoses of Essential hypertension, Type 2 diabetes mellitus without complication, without long-term current use of insulin (HCC), Acute pain of left shoulder, and BMI 33.0-33.9,adult were pertinent to this visit.    Allergies: Codeine; Ace inhibitors; and Flagyl [metronidazole]  Current medicines  "(including changes today)  Current Outpatient Prescriptions   Medication Sig Dispense Refill   • DILTIAZem CD (CARTIA XT) 240 MG CAPSULE SR 24 HR Take 1 Cap by mouth every day. 30 Cap 0   • glyBURIDE (DIABETA) 5 MG Tab Take 1 Tab by mouth 2 times a day, with meals. 180 Tab 1   • conjugated estrogen (PREMARIN) 0.625 MG/GM Cream Insert 0.5 g in vagina every day. Use daily for one week and then use two times weekly 1 Tube 3   • atorvastatin (LIPITOR) 40 MG Tab TAKE ONE TABLET BY MOUTH EVERY EVENING 90 Tab 2   • metformin (GLUCOPHAGE) 1000 MG tablet TAKE ONE TABLET BY MOUTH TWICE A DAY WITH MEALS 180 Tab 1   • losartan (COZAAR) 100 MG Tab TAKE ONE TABLET BY MOUTH DAILY 90 Tab 2   • Blood Glucose Monitoring Suppl SUPPLIES MISC Test strips order: Test strips for Accu Check Compact meter. Sig: use once daily before breakfast and prn ssx high or low sugar #150 RF x 3 150 Strip 5     Current Facility-Administered Medications   Medication Dose Route Frequency Provider Last Rate Last Dose   • triamcinolone acetonide (KENALOG-40) injection 60 mg  60 mg Intra-articular Once [place] SHANON Chauhan, A.P.R.N.           She  has a past medical history of Anesthesia; Chronic low back pain; Cold (3/29/12); Dental disorder; DM (diabetes mellitus) (HCC); Hypercholesteremia; Hypertension; Mass of cecum (9/15); and Other specified disorder of intestines. She also has no past medical history of Breast cancer (HCC).        ROS  As stated in HPI and additionally  Gen: No F/C, fatigue, malaise, weight loss  Neuro: No HA or dizziness  CV: no chest pain, PAIGE, LE edema  Endo: No polyuria or polydipsia     Objective:     Blood pressure 136/82, pulse 86, temperature 36.9 °C (98.4 °F), temperature source Temporal, height 1.702 m (5' 7\"), weight 97.5 kg (215 lb), SpO2 99 %, not currently breastfeeding. Body mass index is 33.67 kg/m².  Physical Exam:  General: Alert, oriented, in no acute distress.  Eye contact is good, speech goal directed, affect calm  CNs " grossly intact.  HEENT: conjunctiva non-injected, sclera non-icteric, EOMs intact.   Gross hearing intact.  Lungs: unlabored. clear to auscultation bilaterally with good excursion.  CV: regular rate and rhythm. No murmurs.   Left shoulder: No swelling, deformity, atrophy, surgical scars, lesions, erythema. No deformity of clavicle, acromioclavicular joint, borders of scapula, or greater or lesser tuberosities of humerus. No tenderness to palpation, no instability; limited ROM through forward flexion and abduction. No crepitus.   Remainding ipsilateral elbow, wrist and hand exam is normal, contralateral shoulder exam is normal.  Rotator Cuff eval:  Empty Can test negative Supraspinatus 5/5  External rotation negative Infraspinatus and teres minor 5/5  AC Joint/Impingement: No tenderness of AC. Cross arm test without pain at AC joint  Ext: no edema, normal color and temperature.   Skin: No rashes or lesions in visible areas  Gait steady.     Assessment and Plan:   Assessment/Plan:  1. Essential hypertension  Not controlled.  Increase Cartia XR to 240 mg daily.  Encouraged healthy diet changes and increased exercise.  Follow-up in 2 weeks for blood pressure check.  Goal BP less than 130/80.    2. Type 2 diabetes mellitus without complication, without long-term current use of insulin (HCC)  Not controlled.  Increase glyburide to 5 mg twice daily.  Refer for nutritional counseling and encouraged increased physical activity.  Follow-up in December  - REFERRAL TO [facility] (HIP) Services Requested: Registered Dietitian for Medical Nutrition Therapy; Reason for Visit: Medical Condition Requiring Nutrition Counseling, Overweight/Obesity    3. Acute pain of left shoulder  Not controlled.  Differentials include rotator cuff injury versus bursitis.  Injection given today.  Patient aware that this may increase her sugars, therefore we have discussed increasing glyburide to 10 mg twice daily if she  notices any increased thirst or urination.  She will also drink minimum 80 ounces of water over the next 3 days.  - triamcinolone acetonide (KENALOG-40) injection 60 mg; 1.5 mL by Intra-articular route Once.    4. BMI 33.0-33.9,adult  - REFERRAL TO [facility] (HIP) Services Requested: Registered Dietitian for Medical Nutrition Therapy; Reason for Visit: Medical Condition Requiring Nutrition Counseling, Overweight/Obesity       Follow up:  Return keep 12/31 appt.    Educated in proper administration of medication(s) ordered today including safety, possible SE, risks, benefits, rationale and alternatives to therapy.   Supportive care, differential diagnoses, and indications for immediate follow-up discussed with patient.    Pathogenesis of diagnosis discussed including typical length and natural progression.    Instructed to return to clinic or nearest emergency department for any change in condition, further concerns, or worsening of symptoms.  Patient states understanding of the plan of care and discharge instructions.      Please note that this dictation was created using voice recognition software. I have made every reasonable attempt to correct obvious errors, but I expect that there are errors of grammar and possibly content that I did not discover before finalizing the note.    Followup: Return keep 12/31 appt. sooner should new symptoms or problems arise.

## 2018-10-31 NOTE — ASSESSMENT & PLAN NOTE
Long-standing history of diabetes.  Hemoglobin A1c tested 6 weeks ago and is uncontrolled level of 9.2.  She reports compliance with metformin 1000 mg twice daily and glyburide 5 mg daily.  She has never had diabetes education and does not exercise.

## 2018-10-31 NOTE — ASSESSMENT & PLAN NOTE
She does not exercise.  Diet is high in carbohydrates and processed foods.  Lacking vegetables, fruits, and whole grains.  Reports she does drink adequate fluid intake

## 2018-11-01 ENCOUNTER — TELEPHONE (OUTPATIENT)
Dept: OBGYN | Facility: CLINIC | Age: 63
End: 2018-11-01

## 2018-11-01 NOTE — TELEPHONE ENCOUNTER
----- Message from She Yee D.O. sent at 10/29/2018  5:39 PM PDT -----  Please let patient know her pap is negative    11/1/18 @ 3915 Unable to contact pt, msg left to call back.  Letter sent out today.

## 2018-12-02 ENCOUNTER — PATIENT MESSAGE (OUTPATIENT)
Dept: MEDICAL GROUP | Facility: PHYSICIAN GROUP | Age: 63
End: 2018-12-02

## 2018-12-04 RX ORDER — DILTIAZEM HYDROCHLORIDE 240 MG/1
240 CAPSULE, COATED, EXTENDED RELEASE ORAL DAILY
Qty: 90 CAP | Refills: 0 | Status: SHIPPED | OUTPATIENT
Start: 2018-12-04 | End: 2019-03-22 | Stop reason: SDUPTHER

## 2018-12-31 ENCOUNTER — OFFICE VISIT (OUTPATIENT)
Dept: MEDICAL GROUP | Facility: PHYSICIAN GROUP | Age: 63
End: 2018-12-31
Payer: COMMERCIAL

## 2018-12-31 VITALS
BODY MASS INDEX: 33.74 KG/M2 | TEMPERATURE: 97.6 F | SYSTOLIC BLOOD PRESSURE: 124 MMHG | DIASTOLIC BLOOD PRESSURE: 76 MMHG | HEART RATE: 81 BPM | WEIGHT: 215 LBS | HEIGHT: 67 IN | OXYGEN SATURATION: 92 %

## 2018-12-31 DIAGNOSIS — I10 ESSENTIAL HYPERTENSION: ICD-10-CM

## 2018-12-31 DIAGNOSIS — M54.42 CHRONIC LEFT-SIDED LOW BACK PAIN WITH LEFT-SIDED SCIATICA: ICD-10-CM

## 2018-12-31 DIAGNOSIS — E78.5 DYSLIPIDEMIA: ICD-10-CM

## 2018-12-31 DIAGNOSIS — G89.29 CHRONIC LEFT SHOULDER PAIN: ICD-10-CM

## 2018-12-31 DIAGNOSIS — N81.2 CERVIX PROLAPSED INTO VAGINA: ICD-10-CM

## 2018-12-31 DIAGNOSIS — M25.512 CHRONIC LEFT SHOULDER PAIN: ICD-10-CM

## 2018-12-31 DIAGNOSIS — E11.9 TYPE 2 DIABETES MELLITUS WITHOUT COMPLICATION, WITHOUT LONG-TERM CURRENT USE OF INSULIN (HCC): ICD-10-CM

## 2018-12-31 DIAGNOSIS — Z12.31 ENCOUNTER FOR SCREENING MAMMOGRAM FOR BREAST CANCER: ICD-10-CM

## 2018-12-31 DIAGNOSIS — G89.29 CHRONIC LEFT-SIDED LOW BACK PAIN WITH LEFT-SIDED SCIATICA: ICD-10-CM

## 2018-12-31 DIAGNOSIS — J40 BRONCHITIS: ICD-10-CM

## 2018-12-31 PROBLEM — J20.9 BRONCHITIS, ACUTE, WITH BRONCHOSPASM: Status: ACTIVE | Noted: 2018-12-31

## 2018-12-31 PROBLEM — J02.9 SORE THROAT: Status: ACTIVE | Noted: 2018-12-31

## 2018-12-31 PROBLEM — J21.8 ACUTE BRONCHIOLITIS DUE TO OTHER SPECIFIED ORGANISMS: Status: ACTIVE | Noted: 2018-12-31

## 2018-12-31 LAB
INT CON NEG: NEGATIVE
INT CON POS: POSITIVE
S PYO AG THROAT QL: NEGATIVE

## 2018-12-31 PROCEDURE — 87880 STREP A ASSAY W/OPTIC: CPT | Performed by: NURSE PRACTITIONER

## 2018-12-31 PROCEDURE — 99214 OFFICE O/P EST MOD 30 MIN: CPT | Performed by: NURSE PRACTITIONER

## 2018-12-31 RX ORDER — LOSARTAN POTASSIUM 100 MG/1
100 TABLET ORAL DAILY
Qty: 90 TAB | Refills: 3 | Status: SHIPPED | OUTPATIENT
Start: 2018-12-31 | End: 2020-01-07 | Stop reason: SDUPTHER

## 2018-12-31 RX ORDER — ATORVASTATIN CALCIUM 40 MG/1
40 TABLET, FILM COATED ORAL
Qty: 90 TAB | Refills: 3 | Status: SHIPPED | OUTPATIENT
Start: 2018-12-31 | End: 2020-01-07 | Stop reason: SDUPTHER

## 2018-12-31 RX ORDER — BENZONATATE 100 MG/1
100 CAPSULE ORAL 3 TIMES DAILY PRN
Qty: 60 CAP | Refills: 0 | Status: SHIPPED | OUTPATIENT
Start: 2018-12-31 | End: 2019-03-25

## 2018-12-31 RX ORDER — AZITHROMYCIN 250 MG/1
TABLET, FILM COATED ORAL
Qty: 6 TAB | Refills: 0 | Status: SHIPPED | OUTPATIENT
Start: 2018-12-31 | End: 2019-03-25

## 2018-12-31 RX ORDER — GLYBURIDE 5 MG/1
5 TABLET ORAL 2 TIMES DAILY WITH MEALS
Qty: 180 TAB | Refills: 3 | Status: SHIPPED | OUTPATIENT
Start: 2018-12-31 | End: 2020-02-20

## 2018-12-31 NOTE — ASSESSMENT & PLAN NOTE
This is a chronic problem for the patient that is uncontrolled. The patient's most recent hemoglobin A1c was checked on 9/11/2018 with a result of 9.2% (estimated average glucose 217).  The patient reports compliance with taking metformin 1000 mg twice daily and glyburide 5 mg twice daily. The patient's glyburide was increased to twice daily on 10/31/2018.  Patient reports that she does check her blood sugar at home every 1-2 weeks and states that it ranges 104-120.  The patient denies any side effects from the medication.  Patient states that she has not had a diabetes RN/PCP combo visit and is interested.

## 2018-12-31 NOTE — ASSESSMENT & PLAN NOTE
This is an acute problem for the patient that started last Wednesday requiring her to call in sick on Thursday.  The patient states that her symptoms started with a severe sore throat, fever of 101.0, and nausea/vomiting.  She now has a persistent, productive yellow sputum cough.  Her cough is preventing her from sleeping.  She is also experiencing sinus and nasal congestion/pain.  Denies any ear pain.  The patient reports that some of her coworkers have been sick.  She has an albuterol inhaler from a prior acute illness that she has not tried yet.  She has been taking over-the-counter Delia-Hartman cold plus, Advil, NyQuil, cough drops which are not helping.    Obtained and reviewed the patient utilization report state pharmacy database on 12/31/2018.  Based on assessment of report prescription for TUSSIONEX PENNKINETIC ER is medically necessary.

## 2018-12-31 NOTE — ASSESSMENT & PLAN NOTE
This is a chronic problem for the patient that is uncontrolled. The patient's last lipid profile was completed on 9/11/2018.   Lab Results  Component Value Date/Time   CHOLSTRLTOT 166 09/11/2018 0639   TRIGLYCERIDE 170 (H) 09/11/2018 0639   HDL 45 09/11/2018 0639   LDL 87 09/11/2018 0639     The patient is taking atorvastatin 40 mg 1 time daily at bedtime.  She denies any side effects of the medication.

## 2018-12-31 NOTE — LETTER
[facility]  FILI Kaminski  [address]  [place] [address]  Fax: [phone]   Authorization for Release/Disclosure of   Protected Health Information   Name: NISHA DAVIDSON : 1955 SSN: [id number]   [facility] [address] [place] [address] Phone:    [phone] (home)    I authorize the entity listed below to release/disclose the PHI below to:   [facility]/FILI Kaminski and FILI Kaminski   Provider or Entity Name:  [id number]   Address   City, State, Zip   Phone:      Fax:     Reason for request: continuity of care   Information to be released:    [  ] LAST COLONOSCOPY,  including any PATH REPORT and follow-up  [  ] LAST FIT/COLOGUARD RESULT [  ] LAST DEXA  [  ] LAST MAMMOGRAM  [  ] LAST PAP  [  ] LAST LABS [  ] RETINA EXAM REPORT  [X] IMMUNIZATION RECORDS  [  ] Release all info      [  ] Check here and initial the line next to each item to release ALL health information INCLUDING  _____ Care and treatment for drug and / or alcohol abuse  _____ HIV testing, infection status, or AIDS  _____ Genetic Testing    DATES OF SERVICE OR TIME PERIOD TO BE DISCLOSED: _____________  I understand and acknowledge that:  * This Authorization may be revoked at any time by you in writing, except if your health information has already been used or disclosed.  * Your health information that will be used or disclosed as a result of you signing this authorization could be re-disclosed by the recipient. If this occurs, your re-disclosed health information may no longer be protected by State or Federal laws.  * You may refuse to sign this Authorization. Your refusal will not affect your ability to obtain treatment.  * This Authorization becomes effective upon signing and will  on (date) __________.      If no date is indicated, this Authorization will  one (1) year from the signature date.    Name: Nisha Davidson    Signature:   Date:     2018            PLEASE FAX REQUESTED RECORDS BACK TO: [phone]

## 2018-12-31 NOTE — ASSESSMENT & PLAN NOTE
This is a chronic problem patient that is uncontrolled.  The patient's weight today is 215 pounds with a BMI of 33.67.  The patient reports that she owns a treadmill that she tries to use a couple times per week.  She works at [email or web address] and is on her feet all day while working and frequently and climbing stairs.  Patient reports that she works Monday through Friday and is exhausted at the end of the day to generally does not exercise during the week.  Patient reports that her diet has been poor with the holidays, but normally she only eats breakfast on the weekends will have a light sandwich for lunch and her dinner usually consists of frozen dinners ([employer]).  The patient lives alone and does not have any motivation to cook meals for only herself.  She does plan to work on her diet once the holidays are over.

## 2018-12-31 NOTE — ASSESSMENT & PLAN NOTE
"This is a chronic problem for the patient that has been ongoing for a couple of years.  Patient states that she was seen by a \"back doctor\" and told that she had \"disc issues\".  She has had a few epidurals in the past that are effective in relieving pain, states it has been at least 2 years since her last epidural, and her pain has been managed so she has not returned.  She reports that her pain is \"not too bad\" and will take Advil as needed.  Patient states that if the pain returns she will return to the back doctor.  "

## 2018-12-31 NOTE — PROGRESS NOTES
"CC:   Chief Complaint   Patient presents with   • Hyperlipidemia   • Hypertension   • Diabetes     refill on glyburide 90 days    • Pharyngitis     x4 days cough x3       HISTORY OF THE PRESENT ILLNESS: Patient is a 63 y.o. female. This pleasant patient is here today to establish care and discuss multiple issues as listed below.    Health Maintenance: Reviewed, patient reports that she had her flu vaccine at work from [employer] around September 2018.    BMI 33.0-33.9,adult  This is a chronic problem patient that is uncontrolled.  The patient's weight today is 215 pounds with a BMI of 33.67.  The patient reports that she owns a treadmill that she tries to use a couple times per week.  She works at [employer] and is on her feet all day while working and frequently and climbing stairs.  Patient reports that she works Monday through Friday and is exhausted at the end of the day to generally does not exercise during the week.  Patient reports that her diet has been poor with the holidays, but normally she only eats breakfast on the weekends will have a light sandwich for lunch and her dinner usually consists of frozen dinners ([employer]).  The patient lives alone and does not have any motivation to cook meals for only herself.  She does plan to work on her diet once the holidays are over.      Chronic left shoulder pain  This is a chronic problem for the patient that started a few months ago.  This problem is managed by [facility] orthopedics.  She was seen by Ortho around [facility] and was told that she has \"possible rotator cuff problem\".  It was recommended for her to complete physical therapy 2-3 times per week and if there is no improvement to get an MRI.  Due to the holiday schedule with her work she was unable to fit physical therapy into her schedule.  Now that the holidays are over and her work schedule is slowing down she plans to return to orthopedics to have this further worked up.  The patient states that the " "pain is worse at night when she sleeps, she will use a heating pad, Aspercreme, or salonpas with slight relief.  She reports that she has mild pain throughout the day that is worse with certain positions.  She denies any injury or trauma to her left shoulder.    Chronic left-sided low back pain with left-sided sciatica  This is a chronic problem for the patient that has been ongoing for a couple of years.  Patient states that she was seen by a \"back doctor\" and told that she had \"disc issues\".  She has had a few epidurals in the past that are effective in relieving pain, states it has been at least 2 years since her last epidural, and her pain has been managed so she has not returned.  She reports that her pain is \"not too bad\" and will take Advil as needed.  Patient states that if the pain returns she will return to the back doctor.    Dyslipidemia  This is a chronic problem for the patient that is uncontrolled. The patient's last lipid profile was completed on 9/11/2018.   Lab Results  Component Value Date/Time   CHOLSTRLTOT 166 09/11/2018 0639   TRIGLYCERIDE 170 (H) 09/11/2018 0639   HDL 45 09/11/2018 0639   LDL 87 09/11/2018 0639     The patient is taking atorvastatin 40 mg 1 time daily at bedtime.  She denies any side effects of the medication.    Essential hypertension  This is a chronic problem for the patient that is currently controlled.  The patient's blood pressure today is 124/76 with a heart rate of 81.  The patient reports compliance with diltiazem  mg/day and losartan 100 mg/day.  Patient does have a history of a cough with lisinopril but denies any side effects with losartan.  The patient reports that she does not check her blood pressure at home, sometimes will when she is in a store. The patient denies chest pain, shortness of breath, headaches, dizziness, blurry vision, or dyspnea on exertion.    Cervix prolapsed into vagina  This is a chronic problem for the patient that is managed by " gynecology, Dr. Yee.  The patient was told that she needs to have a hysterectomy but her blood pressure was uncontrolled and it was recommended by gynecology to follow-up with PCP to get blood pressure under control prior to hysterectomy.  Now that blood pressure is under control she will be returning to gynecology to schedule hysterectomy.    Type 2 diabetes mellitus without complication  This is a chronic problem for the patient that is uncontrolled. The patient's most recent hemoglobin A1c was checked on 9/11/2018 with a result of 9.2% (estimated average glucose 217).  The patient reports compliance with taking metformin 1000 mg twice daily and glyburide 5 mg twice daily. The patient's glyburide was increased to twice daily on 10/31/2018.  Patient reports that she does check her blood sugar at home every 1-2 weeks and states that it ranges 104-120.  The patient denies any side effects from the medication.  Patient states that she has not had a diabetes RN/PCP combo visit and is interested.    Bronchitis, acute, with bronchospasm  This is an acute problem for the patient that started last Wednesday requiring her to call in sick on Thursday.  The patient states that her symptoms started with a severe sore throat, fever of 101.0, and nausea/vomiting.  She now has a persistent, productive yellow sputum cough.  Her cough is preventing her from sleeping.  She is also experiencing sinus and nasal congestion/pain.  Denies any ear pain.  The patient reports that some of her coworkers have been sick.  She has an albuterol inhaler from a prior acute illness that she has not tried yet.  She has been taking over-the-counter Delia-Carrie cold plus, Advil, NyQuil, cough drops which are not helping.    Obtained and reviewed the patient utilization report state pharmacy database on 12/31/2018.  Based on assessment of report prescription for TUSSIONEX PENNKINETIC ER is medically necessary.      Allergies: Codeine; Ace  inhibitors; and Flagyl [metronidazole]    Current Outpatient Prescriptions Ordered in [facility]   Medication Sig Dispense Refill   • atorvastatin (LIPITOR) 40 MG Tab Take 1 Tab by mouth every bedtime. 90 Tab 3   • glyBURIDE (DIABETA) 5 MG Tab Take 1 Tab by mouth 2 times a day, with meals. 180 Tab 3   • losartan (COZAAR) 100 MG Tab Take 1 Tab by mouth every day. 90 Tab 3   • azithromycin (ZITHROMAX) 250 MG Tab 500 mg PO x 1 on day 1, then 250 mg PO q 24 hours x 4 days 6 Tab 0   • benzonatate (TESSALON) 100 MG Cap Take 1 Cap by mouth 3 times a day as needed for Cough. 60 Cap 0   • Hydrocod Polst-CPM Polst ER (TUSSIONEX PENNKINETIC ER) 10-8 MG/5ML Suspension Extended Release Take 5 mL by mouth every 12 hours for 7 days. 120 mL 0   • DILTIAZem CD (CARTIA XT) 240 MG CAPSULE SR 24 HR Take 1 Cap by mouth every day. 90 Cap 0   • conjugated estrogen (PREMARIN) 0.625 MG/GM Cream Insert 0.5 g in vagina every day. Use daily for one week and then use two times weekly 1 Tube 3   • metformin (GLUCOPHAGE) 1000 MG tablet TAKE ONE TABLET BY MOUTH TWICE A DAY WITH MEALS 180 Tab 1   • Blood Glucose Monitoring Suppl SUPPLIES MISC Test strips order: Test strips for Accu Check Compact meter. Sig: use once daily before breakfast and prn ssx high or low sugar #150 RF x 3 150 Strip 5     No current Epic-ordered facility-administered medications on file.        Past Medical History:   Diagnosis Date   • Anesthesia     hard time waking up   • Chronic low back pain    • Dental disorder    • DM (diabetes mellitus) (HCC)     oral medication   • Hypercholesteremia    • Hypertension    • Mass of cecum 9/15    tubulovillous adenoma polyp   • Obesity (BMI 30.0-34.9)    • Other specified disorder of intestines        Past Surgical History:   Procedure Laterality Date   • CHOLECYSTECTOMY  8/21/2015    Procedure: LAPAROSCOPIC CHOLECYSTECTOMY;  Surgeon: Kris Garrison M.D.;  Location: SURGERY [facility];  Service:    • BOWEL RESECTION LAPAROSCOPIC   8/21/2015    Procedure: LAPAROSCOPIC ILEOCOLIC RESECTION;  Surgeon: Kris Garrison M.D.;  Location: SURGERY [facility];  Service:    • COLONOSCOPY WITH POLYP  8/13/15    multiple polyps, tumor cecum, diverticulosis, int hemorrhoids   • APPENDECTOMY  2015   • ARTHROSCOPY, KNEE  3/14    torn meniscus   • PELVISCOPY  4/6/2012    [facility]   • LYSIS ADHESIONS GYN  4/6/2012    [facility]   • COLONOSCOPY WITH POLYP  2012    3 polyps - hyperplastic, benign serrated, tubulovillous adenoma , scattered diverticula sigmoid colon - [facility]   • OTHER      TONSILLECTOMY   • OTHER      REPAIR LT URETER AGE 19       Social History   Substance Use Topics   • Smoking status: Never Smoker   • Smokeless tobacco: Never Used   • Alcohol use No       Social History     Social History Narrative   • No narrative on file       Family History   Problem Relation Age of Onset   • Cancer Mother         breast cancer   • Lung Disease Mother         emphysema   • Hypertension Father    • Alzheimer's Disease Sister    • Dementia Sister    • Hypertension Brother    • No Known Problems Sister    • No Known Problems Sister    • Hypertension Brother        ROS:   Constitutional: Positive for subjective fever, sleep issues related to coughing, fatigue.  Negative for chills, unexpected weight change, night sweats, body aches,  and generalized weakness.   HEENT: Positive for headache, sinus/nasal congestion/pain, sore throat.  Negative for vision changes, hearing changes, ear pain, tinnitus, ear discharge, rhinorrhea, sneezing, and neck pain.    Respiratory: Positive for cough and sputum production and shortness of breath.  Negative for hemoptysis, chest congestion, dyspnea, and crackles.    Cardiovascular:  Negative for chest pain, palpitations, PAIGE, paroxsymal nocturnal dyspnea, orthopnea, and bilateral lower extremity edema.   Gastrointestinal:   "Negative for heartburn, nausea, vomiting, abdominal pain, hematochezia, melena, diarrhea, constipation, and greasy/foul-smelling stools.   Genitourinary:  Negative for dysuria, nocturia, polyuria, hematuria, pyuria, urinary urgency, urinary frequency, and urinary incontinence.   Musculoskeletal:  Negative for myalgias, back pain, and joint pain.   Skin:  Negative for rash, sores, lumps, itching, cyanotic skin color change.   Neurological:  Negative for dizziness, tingling, tremors, focal sensory deficit, focal weakness and headaches.   Endo/Heme/Allergies:  Does not bruise/bleed easily. Denies cold/heat intolerance.   Psychiatric/Behavioral: Negative for depression, suicidal/homicidal ideation and memory loss.        Exam: Blood pressure 124/76, pulse 81, temperature 36.4 °C (97.6 °F), temperature source Temporal, height 1.702 m (5' 7\"), weight 97.5 kg (215 lb), SpO2 92 %, not currently breastfeeding. Body mass index is 33.67 kg/m².    General:  Normal appearing. No distress.  HEENT: Positive for cough, throat erythema. normocephalic. Eyes conjunctiva clear lids without ptosis, pupils equal and reactive to light accommodation, ears normal shape and contour, canals are clear bilaterally, tympanic membranes are benign, nasal mucosa benign, oropharynx is without edema or exudates.   Neck:  Supple without JVD or bruit. Thyroid is not enlarged.  Pulmonary: Cough on deep inspiration wheezing on expiration.  Normal effort. No rales, ronchi.  Cardiovascular:  Regular rate and rhythm without murmur. Carotid and radial pulses are intact and equal bilaterally.  Abdomen:  Soft, nontender, nondistended. Normal bowel sounds. Liver and spleen are not palpable.  Neurologic:  Grossly nonfocal.  Lymph: Palpable bilateral cervical lymph nodes.  No supraclavicular or axillary lymph nodes are palpable.  Skin:  Warm and dry.  No obvious lesions.  Musculoskeletal:  Normal gait. No extremity cyanosis, clubbing, or edema.  Psych:  Normal " mood and affect. Alert and oriented x3. Judgment and insight is normal.    Assessment/Plan  1.  Acute bronchitis with bronchospasm  This is an acute problem for the patient that started 6 days ago.  The patient reports that she has a sore throat, fever, nausea vomiting, productive cough with thick yellow sputum that is interfering with her sleep, and sinus/nasal congestion/pain.  The patient's POCT rapid strep was negative in clinic.  On assessment the patient had bronchospasm with deep inspiration with congested cough, patient reports that she has a previously prescribed albuterol inhaler at home, recommended that she use this medication while ill.  Azithromycin for 5-day course prescribed, benzonatate 100 mg 3 times daily as needed for cough, TUSSIONEX PENNKINETIC ER as needed for cough nightly prescribed.  Patient does have an allergy to codeine but states that she has had hydrocodone cough medicine in the past and tolerated.  Educated patient to not drink alcohol or drive while taking the medication.  Advised the patient to return to be seen if not having improvement in symptoms over the next few days.  - POCT Rapid Strep A  - azithromycin (ZITHROMAX) 250 MG Tab; 500 mg PO x 1 on day 1, then 250 mg PO q 24 hours x 4 days  Dispense: 6 Tab; Refill: 0  - benzonatate (TESSALON) 100 MG Cap; Take 1 Cap by mouth 3 times a day as needed for Cough.  Dispense: 60 Cap; Refill: 0  - Hydrocod Polst-CPM Polst ER (TUSSIONEX PENNKINETIC ER) 10-8 MG/5ML Suspension Extended Release; Take 5 mL by mouth every 12 hours for 7 days.  Dispense: 120 mL; Refill: 0    2. Type 2 diabetes mellitus without complication, without long-term current use of insulin (HCC)  This is a chronic problem for the patient that is uncontrolled.  The patient's most recent hemoglobin A1c in September 2018 was 9.2.  On October 31, 2018 the patient had her glyburide increased to 5 mg twice daily.  Patient reports that she checks her blood sugar once every 1-2  weeks at home with her blood sugars ranging 104-120.  The patient is interested in a diabetes RN/PCP combo visit, scheduled for 4/4/2019.  Refill for glyburide provided.  - glyBURIDE (DIABETA) 5 MG Tab; Take 1 Tab by mouth 2 times a day, with meals.  Dispense: 180 Tab; Refill: 3    3. Essential hypertension  This is a chronic problem for the patient that is controlled with diltiazem  mg/day and losartan 100 mg/day.  The patient's blood pressure today is 124/76 with a heart rate of 81.  Refill for losartan provided.  - losartan (COZAAR) 100 MG Tab; Take 1 Tab by mouth every day.  Dispense: 90 Tab; Refill: 3    4. Dyslipidemia  This is a chronic problem for the patient that is uncontrolled.  The patient's last lipid profile was completed on 9/11/2018 which revealed elevated triglycerides at 170.  Patient reports compliance with atorvastatin 40 mg nightly, refill provided.  - atorvastatin (LIPITOR) 40 MG Tab; Take 1 Tab by mouth every bedtime.  Dispense: 90 Tab; Refill: 3    5. BMI 33.0-33.9,adult  This is a chronic problem for the patient that is uncontrolled.  Patient's current weight is 251 pounds with a BMI of 33.67.  The patient reports that she does have a treadmill at home and tries to use it a couple times per week but is usually exhausted after being on her feet all day while working.  She also reports that her diet has been poor during the holidays but normally she does not eat that much.  Not currently interested in a referral to [facility] at this time.    6. Chronic left shoulder pain  This is a chronic problem for the patient that started a few months ago, and is being managed by [facility] orthopedics.  The patient states that she was referred to physical therapy around [facility] for 2-3 sessions per week but was unable to attend due to her busy schedule with work.  She plans on returning to see the orthopedic doctor now that work is slowing down.    7. Chronic left-sided  low back pain with left-sided sciatica  This is a chronic problem for the patient that is controlled.  The patient has previously been seen by specialist for this issue and has had a couple epidurals, the last one around 2 years ago.  Patient states that her pain is controlled with over-the-counter ibuprofen as needed.  Patient states she will return to her previous back doctor for epidural if the pain returns.    8. Cervix prolapsed into vagina  This is a problem for the patient that is managed by gynecology, Dr. Yee.  Patient states that she was advised by Dr. Yee to get her blood pressure under control before continuing with a hysterectomy for this issue.  Patient's blood pressure is now under control and she will be scheduling appointment to follow-up with gynecology for this issue.    9. Encounter for screening mammogram for breast cancer  Order provided today.  - MA-SCREENING MAMMO BILAT W/TOMOSYNTHESIS W/CAD; Future    Educated in proper administration of medication(s) ordered today including safety, possible SE, risks, benefits, rationale and alternatives to therapy.   Supportive care, differential diagnoses, and indications for immediate follow-up discussed with patient.    Pathogenesis of diagnosis discussed including typical length and natural progression.    Instructed to return to clinic or nearest emergency department for any change in condition, further concerns, or worsening of symptoms.  Patient states understanding of the plan of care and discharge instructions.    Consent for records release signed to order medical records from [facility] for immunization history, [facility] for last retinal screening.    Return in about 3 months (around 3/31/2019) for Diabetes RN/Provider Combo.    Please note that this dictation was created using voice recognition software. I have made every reasonable attempt to correct obvious errors, but I expect that there are errors of grammar and possibly content  that I did not discover before finalizing the note.

## 2018-12-31 NOTE — ASSESSMENT & PLAN NOTE
This is a chronic problem for the patient that is managed by gynecology, Dr. Yee.  The patient was told that she needs to have a hysterectomy but her blood pressure was uncontrolled and it was recommended by gynecology to follow-up with PCP to get blood pressure under control prior to hysterectomy.  Now that blood pressure is under control she will be returning to gynecology to schedule hysterectomy.

## 2018-12-31 NOTE — ASSESSMENT & PLAN NOTE
"This is a chronic problem for the patient that started a few months ago.  This problem is managed by [facility] orthopedics.  She was seen by Ortho around [place] and was told that she has \"possible rotator cuff problem\".  It was recommended for her to complete physical therapy 2-3 times per week and if there is no improvement to get an MRI.  Due to the holiday schedule with her work she was unable to fit physical therapy into her schedule.  Now that the holidays are over and her work schedule is slowing down she plans to return to orthopedics to have this further worked up.  The patient states that the pain is worse at night when she sleeps, she will use a heating pad, Aspercreme, or salonpas with slight relief.  She reports that she has mild pain throughout the day that is worse with certain positions.  She denies any injury or trauma to her left shoulder.  "

## 2018-12-31 NOTE — LETTER
[facility]  JEANNE Kaminski.  [address]  [place] [address]  Fax: [phone]   Authorization for Release/Disclosure of   Protected Health Information   Name: NISHA DAVIDSON : 1955 SSN: [id number]   [facility] [address] [place] [address] Phone:    [phone] (home)    I authorize the entity listed below to release/disclose the PHI below to:   [facility]/AMBER KaminskiRABIEL and JEANNE Kaminski.   Provider or Entity Name:  [facility]   Address   City, State, [facility] Phone:      Fax:     Reason for request: continuity of care   Information to be released:    [  ] LAST COLONOSCOPY,  including any PATH REPORT and follow-up  [  ] LAST FIT/COLOGUARD RESULT [  ] LAST DEXA  [  ] LAST MAMMOGRAM  [  ] LAST PAP  [  ] LAST LABS [X] RETINA EXAM REPORT  [  ] IMMUNIZATION RECORDS  [  ] Release all info      [  ] Check here and initial the line next to each item to release ALL health information INCLUDING  _____ Care and treatment for drug and / or alcohol abuse  _____ HIV testing, infection status, or AIDS  _____ Genetic Testing    DATES OF SERVICE OR TIME PERIOD TO BE DISCLOSED: _____________  I understand and acknowledge that:  * This Authorization may be revoked at any time by you in writing, except if your health information has already been used or disclosed.  * Your health information that will be used or disclosed as a result of you signing this authorization could be re-disclosed by the recipient. If this occurs, your re-disclosed health information may no longer be protected by State or Federal laws.  * You may refuse to sign this Authorization. Your refusal will not affect your ability to obtain treatment.  * This Authorization becomes effective upon signing and will  on (date) __________.      If no date is indicated, this Authorization will  one (1) year from the signature date.    Name: Nisha Davidson    Signature:   Date:      12/31/2018       PLEASE FAX REQUESTED RECORDS BACK TO: [phone]

## 2018-12-31 NOTE — ASSESSMENT & PLAN NOTE
This is a chronic problem for the patient that is currently controlled.  The patient's blood pressure today is 124/76 with a heart rate of 81.  The patient reports compliance with diltiazem  mg/day and losartan 100 mg/day.  Patient does have a history of a cough with lisinopril but denies any side effects with losartan.  The patient reports that she does not check her blood pressure at home, sometimes will when she is in a store. The patient denies chest pain, shortness of breath, headaches, dizziness, blurry vision, or dyspnea on exertion.

## 2019-01-29 ENCOUNTER — OFFICE VISIT (OUTPATIENT)
Dept: URGENT CARE | Facility: PHYSICIAN GROUP | Age: 64
End: 2019-01-29
Payer: COMMERCIAL

## 2019-01-29 VITALS
BODY MASS INDEX: 35.31 KG/M2 | TEMPERATURE: 97.6 F | SYSTOLIC BLOOD PRESSURE: 132 MMHG | HEIGHT: 67 IN | DIASTOLIC BLOOD PRESSURE: 78 MMHG | HEART RATE: 74 BPM | OXYGEN SATURATION: 94 % | RESPIRATION RATE: 14 BRPM | WEIGHT: 225 LBS

## 2019-01-29 DIAGNOSIS — J22 LRTI (LOWER RESPIRATORY TRACT INFECTION): ICD-10-CM

## 2019-01-29 DIAGNOSIS — J98.01 BRONCHOSPASM: ICD-10-CM

## 2019-01-29 DIAGNOSIS — R05.9 COUGH: ICD-10-CM

## 2019-01-29 PROCEDURE — 99214 OFFICE O/P EST MOD 30 MIN: CPT | Performed by: PHYSICIAN ASSISTANT

## 2019-01-29 RX ORDER — DOXYCYCLINE HYCLATE 100 MG
100 TABLET ORAL 2 TIMES DAILY
Qty: 14 TAB | Refills: 0 | Status: SHIPPED | OUTPATIENT
Start: 2019-01-29 | End: 2019-02-05

## 2019-01-29 RX ORDER — IPRATROPIUM BROMIDE AND ALBUTEROL SULFATE 2.5; .5 MG/3ML; MG/3ML
3 SOLUTION RESPIRATORY (INHALATION) ONCE
Status: COMPLETED | OUTPATIENT
Start: 2019-01-29 | End: 2019-01-29

## 2019-01-29 RX ORDER — ALBUTEROL SULFATE 90 UG/1
2 AEROSOL, METERED RESPIRATORY (INHALATION) EVERY 6 HOURS PRN
Qty: 8.5 G | Refills: 2 | Status: SHIPPED | OUTPATIENT
Start: 2019-01-29 | End: 2020-09-02

## 2019-01-29 RX ADMIN — IPRATROPIUM BROMIDE AND ALBUTEROL SULFATE 3 ML: 2.5; .5 SOLUTION RESPIRATORY (INHALATION) at 18:57

## 2019-01-29 ASSESSMENT — ENCOUNTER SYMPTOMS
SPUTUM PRODUCTION: 1
FEVER: 1
CHILLS: 0
WHEEZING: 1
VOMITING: 0
SHORTNESS OF BREATH: 0
ABDOMINAL PAIN: 0
DIARRHEA: 0
NAUSEA: 0
COUGH: 1
SORE THROAT: 0
MYALGIAS: 1

## 2019-01-29 NOTE — LETTER
January 29, 2019       Patient: Brenda Davidson   YOB: 1955   Date of Visit: 1/29/2019         To Whom It May Concern:    It is my medical opinion that Brenda Davidson should be excused from work for today and tomorrow due to illness.        If you have any questions or concerns, please don't hesitate to call [phone]          Sincerely,          Rafael Spears P.A.-C.  Electronically Signed

## 2019-01-30 NOTE — PROGRESS NOTES
Subjective:     Brenda Davidson is a 63 y.o. female who presents for Cough (poss bronchitis, chest congestion )       Cough   This is a new problem. The current episode started 1 to 4 weeks ago. Associated symptoms include a fever, myalgias and wheezing. Pertinent negatives include no chills, ear pain, rash, sore throat or shortness of breath.     Notes two weeks s/p seeing PCP - tx'ed w/ zpack and cough syrup (she lost cough syrup), finished zpack 1.5 wks ago, notes onset of cough and chest congestion last Friday, now w/ 5d of s/sx, c/o chest congestion,  wheeze,  , denies PMH of bronchospasm but has used MDI in the past, c/o spastic cough w/ some nausea, denies PMH of pneumonia, denies PMH of seasonal allerg. Tried using OTC nyquil advila, heath tammyer.     Past Medical History:   Diagnosis Date   • Anesthesia     hard time waking up   • Chronic low back pain    • Dental disorder    • DM (diabetes mellitus) (HCC)     oral medication   • Hypercholesteremia    • Hypertension    • Mass of cecum 9/15    tubulovillous adenoma polyp   • Obesity (BMI 30.0-34.9)    • Other specified disorder of intestines      Past Surgical History:   Procedure Laterality Date   • CHOLECYSTECTOMY  8/21/2015    Procedure: LAPAROSCOPIC CHOLECYSTECTOMY;  Surgeon: Kris Garrison M.D.;  Location: [facility];  Service:    • BOWEL RESECTION LAPAROSCOPIC  8/21/2015    Procedure: LAPAROSCOPIC ILEOCOLIC RESECTION;  Surgeon: Kris Garrison M.D.;  Location: [facility];  Service:    • COLONOSCOPY WITH POLYP  8/13/15    multiple polyps, tumor cecum, diverticulosis, int hemorrhoids   • APPENDECTOMY  2015   • ARTHROSCOPY, KNEE  3/14    torn meniscus   • PELVISCOPY  4/6/2012    Performed by HILDA MACE at SURGERY [facility] DAY [facility] ORS   • LYSIS ADHESIONS GYN  4/6/2012    Performed by HILDA MACE at SURGERY SAME DAY [facility] ORS   • COLONOSCOPY WITH POLYP  2012    3 polyps - hyperplastic, benign serrated,  "tubulovillous adenoma , scattered diverticula sigmoid colon - [facility]   • OTHER      TONSILLECTOMY   • OTHER      REPAIR LT URETER AGE 19     Social History     Social History   • Marital status: Single     Spouse name: N/A   • Number of children: N/A   • Years of education: N/A     Occupational History   • Not on file.     Social History Main Topics   • Smoking status: Never Smoker   • Smokeless tobacco: Never Used   • Alcohol use No   • Drug use: No   • Sexual activity: Not Currently      Comment: [place]aels     Other Topics Concern   • Not on file     Social History Narrative   • No narrative on file      Family History   Problem Relation Age of Onset   • Cancer Mother         breast cancer   • Lung Disease Mother         emphysema   • Hypertension Father    • Alzheimer's Disease Sister    • Dementia Sister    • Hypertension Brother    • No Known Problems Sister    • No Known Problems Sister    • Hypertension Brother     Review of Systems   Constitutional: Positive for fever. Negative for chills.   HENT: Positive for congestion. Negative for ear pain and sore throat.    Respiratory: Positive for cough, sputum production and wheezing. Negative for shortness of breath.    Gastrointestinal: Negative for abdominal pain, diarrhea, nausea and vomiting.   Musculoskeletal: Positive for myalgias.   Skin: Negative for rash.     Allergies   Allergen Reactions   • Codeine Rash and Itching     Vicodin doesn't cause her any problems   • Ace Inhibitors      cough   • Flagyl [Metronidazole] Nausea   I have worn a mask for the entire encounter with this patient.    Objective:   /78   Pulse 74   Temp 36.4 °C (97.6 °F)   Resp 14   Ht 1.702 m (5' 7\")   Wt 102.1 kg (225 lb)   SpO2 94%   BMI 35.24 kg/m²   Physical Exam   Constitutional: She is oriented to person, place, and time. She appears well-developed and well-nourished. No distress.   HENT:   Head: Normocephalic and atraumatic.   Right Ear: Tympanic membrane, external ear " and ear canal normal.   Left Ear: Tympanic membrane, external ear and ear canal normal.   Nose: Nose normal.   Mouth/Throat: Uvula is midline and mucous membranes are normal. Posterior oropharyngeal erythema ( mild PND) present. No oropharyngeal exudate, posterior oropharyngeal edema or tonsillar abscesses.   Eyes: Conjunctivae and lids are normal. Right eye exhibits no discharge. Left eye exhibits no discharge. No scleral icterus.   Neck: Neck supple.   Pulmonary/Chest: Effort normal. No respiratory distress. She has no decreased breath sounds. She has no wheezes. She has rhonchi. She has no rales.   Musculoskeletal: Normal range of motion.   Lymphadenopathy:     She has cervical adenopathy ( mild bilat).   Neurological: She is alert and oriented to person, place, and time. She is not disoriented.   Skin: Skin is warm and dry. She is not diaphoretic. No erythema. No pallor.   Psychiatric: Her speech is normal and behavior is normal.   Nursing note and vitals reviewed.  dramatic improvement w/ nebs      Assessment/Plan:   Assessment    1. LRTI (lower respiratory tract infection)  - doxycycline (VIBRAMYCIN) 100 MG Tab; Take 1 Tab by mouth 2 times a day for 7 days.  Dispense: 14 Tab; Refill: 0    2. Cough  - Hydrocod Polst-CPM Polst ER (TUSSIONEX) 10-8 MG/5ML Suspension Extended Release; Take 5 mL by mouth every 12 hours for 7 days.  Dispense: 70 mL; Refill: 0  - albuterol 108 (90 Base) MCG/ACT Aero Soln inhalation aerosol; Inhale 2 Puffs by mouth every 6 hours as needed for Shortness of Breath.  Dispense: 8.5 g; Refill: 2  - ipratropium-albuterol (DUONEB) nebulizer solution; 3 mL by Nebulization route Once.    3. Bronchospasm  - albuterol 108 (90 Base) MCG/ACT Aero Soln inhalation aerosol; Inhale 2 Puffs by mouth every 6 hours as needed for Shortness of Breath.  Dispense: 8.5 g; Refill: 2  - ipratropium-albuterol (DUONEB) nebulizer solution; 3 mL by Nebulization route Once.  Supportive care is reviewed with  patient/caregiver - recommend to push PO fluids and electrolytes, Nsaids/tylenol, netti pot/saline irrig, humidifier in home, flonase, antihistamine,  take full course of Rx, take with probiotics, observe for resolution  Return to clinic with lack of resolution or progression of symptoms.    Cautioned regarding potential for sedation with medication.    Differential diagnosis, natural history, supportive care, and indications for immediate follow-up discussed.

## 2019-02-06 NOTE — PROGRESS NOTES
Well woman visit     Brenda Davidson is a 63 y.o.  who presents today for follow up in regards to management for vaginal prolapse.  At time of prior appointment she was counseled about op[tions including doing nothing, PT, pelvic floor exercises, vaginal estrogen, pessary and surgery.  She is interested in surgical management.  Comorbidity include obesity, DM (last hA1c 9.2 in 2018), and HTN which was uncontrolled at the time of her last visit.  Since being seen last her HTN medication was increased and it has been well controlled since.  Reports that she has been attempting to perform the Kegel exercises and have this further considered all options for management of her pelvic organ prolapse and she strongly desires surgical management.    GYN History:  Patient is postmenopausal.. Menopause age 49.  Menarche @14.  Menses regular, lasting 5 days, not particularly heavy or painful.  Last pap 10/23/18 negative, no h/o abnormal pap, nor history of cone biopsy, LEEP or any other cervical, uterine or gynecologic surgery. No history of sexually transmitted diseases.          OB History:    OB History    Para Term  AB Living   3 3       3   SAB TAB Ectopic Molar Multiple Live Births             3      # Outcome Date GA Lbr Earl/2nd Weight Sex Delivery Anes PTL Lv   3 Para      Vag-Spont      2 Para      Vag-Spont      1 Para      Vag-Spont           Review of Systems:   Pertinent positives documented in HPI and all other systems reviewed & are negative.    All PMH, PSH, allergies, social history and FH reviewed and updated today:  Past Medical History:  Past Medical History:   Diagnosis Date   • Anesthesia     hard time waking up   • Chronic low back pain    • Dental disorder    • DM (diabetes mellitus) (HCC)     oral medication   • Hypercholesteremia    • Hypertension    • Mass of cecum 9/15    tubulovillous adenoma polyp   • Obesity (BMI 30.0-34.9)    • Other specified disorder of intestines       Past Surgical History:  Past Surgical History:   Procedure Laterality Date   • CHOLECYSTECTOMY  8/21/2015    Procedure: LAPAROSCOPIC CHOLECYSTECTOMY;  Surgeon: Kris Garrison M.D.;  Location: SURGERY [facility];  Service:    • BOWEL RESECTION LAPAROSCOPIC  8/21/2015    Procedure: LAPAROSCOPIC ILEOCOLIC RESECTION;  Surgeon: Kris Garrison M.D.;  Location: SURGERY [facility];  Service:    • COLONOSCOPY WITH POLYP  8/13/15    multiple polyps, tumor cecum, diverticulosis, int hemorrhoids   • APPENDECTOMY  2015   • ARTHROSCOPY, KNEE  3/14    torn meniscus   • PELVISCOPY  4/6/2012    Performed by HILDA MACE at SURGERY [facility] DAY [facility] ORS   • LYSIS ADHESIONS GYN  4/6/2012    Performed by HILDA MACE at SURGERY SAME DAY [facility] ORS   • COLONOSCOPY WITH POLYP  2012    3 polyps - hyperplastic, benign serrated, tubulovillous adenoma , scattered diverticula sigmoid colon - [facility]   • OTHER      TONSILLECTOMY   • OTHER      REPAIR LT URETER AGE 19     Medications:   Current Outpatient Prescriptions Ordered in [facility]   Medication Sig Dispense Refill   • albuterol 108 (90 Base) MCG/ACT Aero Soln inhalation aerosol Inhale 2 Puffs by mouth every 6 hours as needed for Shortness of Breath. 8.5 g 2   • atorvastatin (LIPITOR) 40 MG Tab Take 1 Tab by mouth every bedtime. 90 Tab 3   • glyBURIDE (DIABETA) 5 MG Tab Take 1 Tab by mouth 2 times a day, with meals. 180 Tab 3   • losartan (COZAAR) 100 MG Tab Take 1 Tab by mouth every day. 90 Tab 3   • azithromycin (ZITHROMAX) 250 MG Tab 500 mg PO x 1 on day 1, then 250 mg PO q 24 hours x 4 days 6 Tab 0   • benzonatate (TESSALON) 100 MG Cap Take 1 Cap by mouth 3 times a day as needed for Cough. 60 Cap 0   • DILTIAZem CD (CARTIA XT) 240 MG CAPSULE SR 24 HR Take 1 Cap by mouth every day. 90 Cap 0   • conjugated estrogen (PREMARIN) 0.625 MG/GM Cream Insert 0.5 g in vagina every day. Use daily for one week and then use two times weekly 1 Tube 3   • metformin (GLUCOPHAGE) 1000  "MG tablet TAKE ONE TABLET BY MOUTH TWICE A DAY WITH MEALS 180 Tab 1   • Blood Glucose Monitoring Suppl SUPPLIES MISC Test strips order: Test strips for Accu Check Compact meter. Sig: use once daily before breakfast and prn ssx high or low sugar #150 RF x 3 150 Strip 5     No current Epic-ordered facility-administered medications on file.        Allergies: Codeine; Ace inhibitors; and Flagyl [metronidazole]    Social History:  Social History     Social History   • Marital status: Single     Spouse name: N/A   • Number of children: N/A   • Years of education: N/A     Social History Main Topics   • Smoking status: Never Smoker   • Smokeless tobacco: Never Used   • Alcohol use No   • Drug use: No   • Sexual activity: Not Currently      Comment: Nathalie     Other Topics Concern   • Not on file     Social History Narrative   • No narrative on file       Family History:  Family History   Problem Relation Age of Onset   • Cancer Mother         breast cancer   • Lung Disease Mother         emphysema   • Hypertension Father    • Alzheimer's Disease Sister    • Dementia Sister    • Hypertension Brother    • No Known Problems Sister    • No Known Problems Sister    • Hypertension Brother            Objective:   Vitals:  Blood pressure 152/86, height 1.702 m (5' 7\"), weight 96.2 kg (212 lb), not currently breastfeeding.  Body mass index is 33.2 kg/m². (Goal BM I>18 <25)    Physical Exam:   Nursing note and vitals reviewed.  Physical Exam   GENERAL: No acute distress  HENT: Atraumatic, normocephalic  EYES: Extraocular movements intact, pupils equal and reactive to light  NECK: Supple, Full ROM  CHEST: No deformities, Equal chest expansion  RESP: Unlabored, no stridor or audible wheeze  ABD: Soft, Nontender, Non-Distended  Extremities: No Clubbing, Cyanosis, or Edema  Skin: Warm/dry, without rases  Neuro: A/O x 4, CN 2-12 Grossly intact, Motor/sensory grossly intact  Psych: Normal mood, behavior, and affect    Genitourinary from " prior visit -exam performed by me on 10/23/18 as below:   Normal-appearing external female genitalia, vagina is pale pink, moist, with slightly flattened rugae, cervix normal in appearance multiparous without masses or lesions, bulging sidewalls, Pap obtained  POP Q:  Aa: -1   Ba: -1   C: -6  Gh: 4   PB: 3   TVL: 8  Ap: 2    Bp: 2    D: -7  Assessment/Plan:     1. Type 2 diabetes mellitus without complication, without long-term current use of insulin ([facility])  HEMOGLOBIN A1C   2. Pre-op testing  CBC WITHOUT DIFFERENTIAL    CREATININE   3. Other female genital prolapse       Brenda Davidson is a 63 y.o.  female with POP   #Pelvic organ prolapse.  Reviewed options for management.  Patient strongly desires surgical management.  Now patient's hypertension is better controlled (slightly elevated upon first 3 today however improved on repeat as first read was obtained after patient had just walked into the building she reports she had rushed to get here.  Discussed that I am happy with the bed with the improved control of her hypertension however I am also concerned about her glucose control.  Last hemoglobin A1c was 9.2.  I expressed that I would like her hemoglobin A1c to be less than 8 prior to proceeding to the operating room.  I have ordered a repeat hemoglobin A1c as it has not been performed since September.  Patient reports that she has been exercising and eating more healthfully so she is hopeful that this level will be appropriate for surgery.  She strongly desires surgery to be performed in the next couple of months before summer.  We will proceed with surgical management but she understands that this may be canceled if her hemoglobin A1c is out of control.  Referral placed for prior authorization for exam under anesthesia, total vaginal hysterectomy, bilateral salpingectomy, vaginal vault suspension, anterior and posterior repair, cystoscopy, and other indicated procedures.  Once prior authorization is  obtained she will expect to hear from her surgery schedulers for a preoperative appointment and surgical date.  At that appointment a repeat pelvic exam and consent will be performed.  #Hypertension.  Now well controlled with medication adjustments except slightly high on first 3 today.  Advised patient monitor blood pressure at home and continue with increased medication.   #Type 2 diabetes.  As discussed above last hemoglobin A1c 9.2-repeat ordered  #Cervical cancer screening. Up to date  # Obesity: Diet and exercise discussed as well as finding lifestyle habits that work for patient.  # Follow up for preoperative visit pending results of labs as noted above    Patient was seen for 25 minutes of which > 50% of appointment time was spent on face-to-face counseling and coordination of care regarding the care of patient's medical condition of pelvic organ prolapse with labs ordered and plan for major surgery.

## 2019-02-07 ENCOUNTER — GYNECOLOGY VISIT (OUTPATIENT)
Dept: OBGYN | Facility: CLINIC | Age: 64
End: 2019-02-07
Payer: COMMERCIAL

## 2019-02-07 VITALS
WEIGHT: 212 LBS | HEIGHT: 67 IN | DIASTOLIC BLOOD PRESSURE: 86 MMHG | BODY MASS INDEX: 33.27 KG/M2 | SYSTOLIC BLOOD PRESSURE: 152 MMHG

## 2019-02-07 DIAGNOSIS — E11.9 TYPE 2 DIABETES MELLITUS WITHOUT COMPLICATION, WITHOUT LONG-TERM CURRENT USE OF INSULIN (HCC): ICD-10-CM

## 2019-02-07 DIAGNOSIS — Z01.818 PRE-OP TESTING: ICD-10-CM

## 2019-02-07 DIAGNOSIS — N81.89 OTHER FEMALE GENITAL PROLAPSE: ICD-10-CM

## 2019-02-07 DIAGNOSIS — I10 ESSENTIAL HYPERTENSION: ICD-10-CM

## 2019-02-07 PROCEDURE — 99214 OFFICE O/P EST MOD 30 MIN: CPT | Performed by: OBSTETRICS & GYNECOLOGY

## 2019-02-07 NOTE — NON-PROVIDER
Pt here to discuss Hysterectomy, states she is ready for surgery.  Pharmacy verified   Denies vaginal bleeding, pelvic pain or pressure.

## 2019-02-27 NOTE — TELEPHONE ENCOUNTER
Requested Prescriptions     Pending Prescriptions Disp Refills   • metformin (GLUCOPHAGE) 1000 MG tablet [Pharmacy Med Name: metFORMIN HCL 1,000 MG TABLET] 180 Tab 0     Sig: TAKE ONE TABLET BY MOUTH TWICE A DAY WITH MEALS       JEANNE Kaminski.

## 2019-03-24 RX ORDER — DILTIAZEM HYDROCHLORIDE 240 MG/1
240 CAPSULE, COATED, EXTENDED RELEASE ORAL DAILY
Qty: 90 CAP | Refills: 0 | Status: SHIPPED | OUTPATIENT
Start: 2019-03-24 | End: 2019-06-21 | Stop reason: SDUPTHER

## 2019-03-25 ENCOUNTER — OFFICE VISIT (OUTPATIENT)
Dept: MEDICAL GROUP | Facility: PHYSICIAN GROUP | Age: 64
End: 2019-03-25
Payer: COMMERCIAL

## 2019-03-25 VITALS
SYSTOLIC BLOOD PRESSURE: 144 MMHG | DIASTOLIC BLOOD PRESSURE: 84 MMHG | RESPIRATION RATE: 12 BRPM | BODY MASS INDEX: 34.21 KG/M2 | WEIGHT: 218 LBS | OXYGEN SATURATION: 94 % | TEMPERATURE: 99 F | HEART RATE: 75 BPM | HEIGHT: 67 IN

## 2019-03-25 DIAGNOSIS — G89.29 CHRONIC LEFT SHOULDER PAIN: ICD-10-CM

## 2019-03-25 DIAGNOSIS — M25.512 CHRONIC LEFT SHOULDER PAIN: ICD-10-CM

## 2019-03-25 PROBLEM — J20.9 BRONCHITIS, ACUTE, WITH BRONCHOSPASM: Status: RESOLVED | Noted: 2018-12-31 | Resolved: 2019-03-25

## 2019-03-25 PROCEDURE — 99213 OFFICE O/P EST LOW 20 MIN: CPT | Performed by: NURSE PRACTITIONER

## 2019-03-25 RX ORDER — KETOROLAC TROMETHAMINE 30 MG/ML
60 INJECTION, SOLUTION INTRAMUSCULAR; INTRAVENOUS ONCE
Status: COMPLETED | OUTPATIENT
Start: 2019-03-25 | End: 2019-03-25

## 2019-03-25 RX ORDER — CYCLOBENZAPRINE HCL 5 MG
5-10 TABLET ORAL 3 TIMES DAILY PRN
Qty: 30 TAB | Refills: 0 | Status: SHIPPED
Start: 2019-03-25 | End: 2020-02-24

## 2019-03-25 RX ORDER — METHYLPREDNISOLONE 4 MG/1
TABLET ORAL
Qty: 21 TAB | Refills: 0 | Status: SHIPPED
Start: 2019-03-25 | End: 2020-02-24

## 2019-03-25 RX ADMIN — KETOROLAC TROMETHAMINE 60 MG: 30 INJECTION, SOLUTION INTRAMUSCULAR; INTRAVENOUS at 16:25

## 2019-03-25 ASSESSMENT — PATIENT HEALTH QUESTIONNAIRE - PHQ9: CLINICAL INTERPRETATION OF PHQ2 SCORE: 0

## 2019-03-25 ASSESSMENT — PAIN SCALES - GENERAL: PAINLEVEL: 10=SEVERE PAIN

## 2019-03-25 NOTE — ASSESSMENT & PLAN NOTE
"This is a chronic problem for the patient that is worsening.  Patient reports that she developed left shoulder pain around September 2018.  The patient was being seen by orthopedics at [facility] orthoped[facility].  She was seen around Thanksgiving 2018 and told that she had a \"possible rotator cuff problem\".  At that time the patient was referred to physical therapy 2-3 times per week for 6 weeks.  The patient also had an x-ray completed at that time and was told that there was \"inflammation in her rotator cuff\".  Patient reports that she has had steroid injections in her shoulder in the past which offered mild relief.  Patient reports that she was unable to afford physical therapy 3 times per week times 6 weeks as her co-pay was $75 per visit.  The patient downloaded exercises from the Internet and has been doing home physical therapy exercises nightly.  The patient is also using heating pads, salon Pas topical patches, and Aspercreme.  Patient reports that her pain is in the left side of her neck down her left shoulder and her left collarbone.  She will rubs the muscle in her neck/shoulder which offers temporary relief.  She is using over-the-counter Advil as needed, which is not helping relieve pain.  Patient reports that her pain is interfering with her sleep nightly.  "

## 2019-03-26 NOTE — PROGRESS NOTES
"CC:   Chief Complaint   Patient presents with   • Neck Pain     left shoulder and neck pain, collar bone, seen orthopedic for this x7 mo        HISTORY OF THE PRESENT ILLNESS: Patient is a 63 y.o. female. This pleasant patient is here today for chronic shoulder pain.    Chronic left shoulder pain  This is a chronic problem for the patient that is worsening.  Patient reports that she developed left shoulder pain around September 2018.  The patient was being seen by orthopedics at [facility].  She was seen around Thanksgiving 2018 and told that she had a \"possible rotator cuff problem\".  At that time the patient was referred to physical therapy 2-3 times per week for 6 weeks.  The patient also had an x-ray completed at that time and was told that there was \"inflammation in her rotator cuff\".  Patient reports that she has had steroid injections in her shoulder in the past which offered mild relief.  Patient reports that she was unable to afford physical therapy 3 times per week times 6 weeks as her co-pay was $75 per visit.  The patient downloaded exercises from the Internet and has been doing home physical therapy exercises nightly.  The patient is also using heating pads, salon Pas topical patches, and Aspercreme.  Patient reports that her pain is in the left side of her neck down her left shoulder and her left collarbone.  She will rubs the muscle in her neck/shoulder which offers temporary relief.  She is using over-the-counter Advil as needed, which is not helping relieve pain.  Patient reports that her pain is interfering with her sleep nightly.    Allergies: Codeine; Ace inhibitors; and Flagyl [metronidazole]    Current Outpatient Prescriptions Ordered in [facility]   Medication Sig Dispense Refill   • MethylPREDNISolone (MEDROL DOSEPAK) 4 MG Tablet Therapy Pack As directed on the packaging label. 21 Tab 0   • cyclobenzaprine (FLEXERIL) 5 MG tablet Take 1-2 Tabs by mouth 3 times a day as needed for " Moderate Pain or Muscle Spasms. 30 Tab 0   • DILTIAZem CD (CARTIA XT) 240 MG CAPSULE SR 24 HR Take 1 Cap by mouth every day. 90 Cap 0   • metformin (GLUCOPHAGE) 1000 MG tablet TAKE ONE TABLET BY MOUTH TWICE A DAY WITH MEALS 180 Tab 0   • atorvastatin (LIPITOR) 40 MG Tab Take 1 Tab by mouth every bedtime. 90 Tab 3   • glyBURIDE (DIABETA) 5 MG Tab Take 1 Tab by mouth 2 times a day, with meals. 180 Tab 3   • losartan (COZAAR) 100 MG Tab Take 1 Tab by mouth every day. 90 Tab 3   • Blood Glucose Monitoring Suppl SUPPLIES MISC Test strips order: Test strips for Accu Check Compact meter. Sig: use once daily before breakfast and prn ssx high or low sugar #150 RF x 3 150 Strip 5   • albuterol 108 (90 Base) MCG/ACT Aero Soln inhalation aerosol Inhale 2 Puffs by mouth every 6 hours as needed for Shortness of Breath. 8.5 g 2   • conjugated estrogen (PREMARIN) 0.625 MG/GM Cream Insert 0.5 g in vagina every day. Use daily for one week and then use two times weekly 1 Tube 3     No current Epic-ordered facility-administered medications on file.        Past Medical History:   Diagnosis Date   • Anesthesia     hard time waking up   • Chronic low back pain    • Dental disorder    • DM (diabetes mellitus) (HCC)     oral medication   • Hypercholesteremia    • Hypertension    • Mass of cecum 9/15    tubulovillous adenoma polyp   • Obesity (BMI 30.0-34.9)    • Other specified disorder of intestines        Past Surgical History:   Procedure Laterality Date   • CHOLECYSTECTOMY  8/21/2015    Procedure: LAPAROSCOPIC CHOLECYSTECTOMY;  Surgeon: Kris Garrison M.D.;  Location: SURGERY [facility];  Service:    • BOWEL RESECTION LAPAROSCOPIC  8/21/2015    Procedure: LAPAROSCOPIC ILEOCOLIC RESECTION;  Surgeon: Kris Garrison M.D.;  Location: SURGERY [facility];  Service:    • COLONOSCOPY WITH POLYP  8/13/15    multiple polyps, tumor cecum, diverticulosis, int hemorrhoids   • APPENDECTOMY  2015   • ARTHROSCOPY, KNEE  3/14    torn meniscus    • PELVISCOPY  4/6/2012    [facility]   • LYSIS ADHESIONS GYN  4/6/2012    Performed by HILDA MACE at SURGERY SAME DAY [facility]   • COLONOSCOPY WITH POLYP  2012    3 polyps - hyperplastic, benign serrated, tubulovillous adenoma , scattered diverticula sigmoid colon - [facility]   • OTHER      TONSILLECTOMY   • OTHER      REPAIR LT URETER AGE 19       Social History   Substance Use Topics   • Smoking status: Never Smoker   • Smokeless tobacco: Never Used   • Alcohol use No       Social History     Social History Narrative   • No narrative on file       Family History   Problem Relation Age of Onset   • Cancer Mother         breast cancer   • Lung Disease Mother         emphysema   • Hypertension Father    • Alzheimer's Disease Sister    • Dementia Sister    • Hypertension Brother    • No Known Problems Sister    • No Known Problems Sister    • Hypertension Brother        ROS:   Constitutional: Positive for sleep issues related to left shoulder pain. negative for fever, chills, unexpected weight change, night sweats, body aches, and fatigue/generalized weakness.   HEENT: Positive for left neck pain.  Negative for headaches, vision changes, hearing changes, ear pain, tinnitus, ear discharge, rhinorrhea, sinus congestion, sneezing, sore throat.    Respiratory:  Negative for cough, shortness of breath, sputum production, hemoptysis, chest congestion, dyspnea, wheezing, and crackles.    Cardiovascular:  Negative for chest pain, palpitations, PAIGE, paroxsymal nocturnal dyspnea, orthopnea, and bilateral lower extremity edema.   Musculoskeletal: Positive for left neck pain, left shoulder pain, left collarbone pain.  Skin:  Negative for rash, sores, lumps, itching, cyanotic skin color change.   Neurological:  Negative for dizziness, tingling, tremors, focal sensory deficit, focal weakness and headaches.         Exam: Blood pressure 144/84, pulse 75, temperature 37.2 °C  "(99 °F), resp. rate 12, height 1.702 m (5' 7\"), weight 98.9 kg (218 lb), SpO2 94 %, not currently breastfeeding. Body mass index is 34.14 kg/m².    General: Mild distress r/t pain. Normal appearing.  HEENT:  Normocephalic. Eyes conjunctiva clear lids without ptosis, pupils equal and reactive to light accommodation, ears normal shape and contour.   Neck: Left neck muscle tension, tender to palpation.  Pulmonary:  Clear to ausculation.  Normal effort. No rales, ronchi, or wheezing.  Cardiovascular:  Regular rate and rhythm without murmur. Carotid and radial pulses are intact and equal bilaterally.  Neurologic:  Grossly nonfocal.  Skin:  Warm and dry.  No obvious lesions.  Musculoskeletal: Left neck muscle tension with tenderness to palpation. Left shoulder limited mobility, pain with raising arm up - unable to bring left arm higher than shoulder height without extreme pain, pain with putting arm behind back, mild pain with empty can test. Tenderness to palpation of left shoulder. No swelling or erythema.  Psych:  Normal mood and affect. Alert and oriented x3. Judgment and insight is normal.    Assessment/Plan:  1. Chronic left shoulder pain  MR-SHOULDER-W/O LEFT  Patient would like to discuss a referral to a different orthopedic office depending on results of MRI    MethylPREDNISolone (MEDROL DOSEPAK) 4 MG Tablet Therapy Pack    cyclobenzaprine (FLEXERIL) 5 MG tablet    ketorolac (TORADOL) injection 60 mg     Educated in proper administration of medication(s) ordered today including safety, possible SE, risks, benefits, rationale and alternatives to therapy.   Supportive care, differential diagnoses, and indications for immediate follow-up discussed with patient.    Pathogenesis of diagnosis discussed including typical length and natural progression.    Instructed to return to clinic or nearest emergency department for any change in condition, further concerns, or worsening of symptoms.  Patient states understanding " of the plan of care and discharge instructions.    Return for Follow up Diagnostics.    I have placed the below orders and discussed them with an approved delegating provider. The MA is performing the below orders under the direction of Dr. Segura.    Please note that this dictation was created using voice recognition software. I have made every reasonable attempt to correct obvious errors, but I expect that there are errors of grammar and possibly content that I did not discover before finalizing the note.

## 2019-04-05 ENCOUNTER — HOSPITAL ENCOUNTER (OUTPATIENT)
Dept: RADIOLOGY | Facility: MEDICAL CENTER | Age: 64
End: 2019-04-05
Attending: NURSE PRACTITIONER
Payer: COMMERCIAL

## 2019-04-05 DIAGNOSIS — G89.29 CHRONIC LEFT SHOULDER PAIN: ICD-10-CM

## 2019-04-05 DIAGNOSIS — M25.512 CHRONIC LEFT SHOULDER PAIN: ICD-10-CM

## 2019-04-05 PROCEDURE — 73221 MRI JOINT UPR EXTREM W/O DYE: CPT | Mod: LT

## 2019-04-08 ENCOUNTER — TELEPHONE (OUTPATIENT)
Dept: MEDICAL GROUP | Facility: PHYSICIAN GROUP | Age: 64
End: 2019-04-08

## 2019-04-08 DIAGNOSIS — G89.29 CHRONIC LEFT SHOULDER PAIN: ICD-10-CM

## 2019-04-08 DIAGNOSIS — M25.512 CHRONIC LEFT SHOULDER PAIN: ICD-10-CM

## 2019-04-08 NOTE — TELEPHONE ENCOUNTER
Phone Number Called: [phone] (home)     Message: patient informed of MRI results. Patient has referral to be seen at [facility] and will try to see a different provider instead. Patient is in pain, OTC meds not working, steroid shots and flexaril not working. Please advise for pain until she sees orthopaedics, she doesn't want controlled substance because of work.    Left Message for patient to call back: N\A

## 2019-04-08 NOTE — TELEPHONE ENCOUNTER
Phone Number Called: [phone] (cell phone)    Message: left vm about mri results and to call back for any questions or concerns     Left Message for patient to call back: yes

## 2019-06-17 DIAGNOSIS — E11.9 TYPE 2 DIABETES MELLITUS WITHOUT COMPLICATION, WITHOUT LONG-TERM CURRENT USE OF INSULIN (HCC): ICD-10-CM

## 2019-06-17 NOTE — TELEPHONE ENCOUNTER
Was the patient seen in the last year in this department? Yes LOV 03/29/19    Does patient have an active prescription for medications requested? No     Received Request Via: Pharmacy

## 2019-06-17 NOTE — TELEPHONE ENCOUNTER
Requested Prescriptions     Pending Prescriptions Disp Refills   • metformin (GLUCOPHAGE) 1000 MG tablet [Pharmacy Med Name: metFORMIN HCL 1,000 MG TABLET] 180 Tab 1     Sig: TAKE ONE TABLET BY MOUTH TWICE A DAY WITH MEALS       JEANNE Kaminski.

## 2019-06-24 RX ORDER — DILTIAZEM HYDROCHLORIDE 240 MG/1
CAPSULE, EXTENDED RELEASE ORAL
Qty: 90 CAP | Refills: 3 | Status: SHIPPED | OUTPATIENT
Start: 2019-06-24 | End: 2020-07-06

## 2019-06-24 NOTE — TELEPHONE ENCOUNTER
Requested Prescriptions     Pending Prescriptions Disp Refills   • CARTIA  MG CAPSULE SR 24 HR [Pharmacy Med Name: CARTIA  MG CAPSULE] 90 Cap 3     Sig: TAKE ONE CAPSULE BY MOUTH DAILY       JEANNE Kaminski.

## 2019-07-01 DIAGNOSIS — Z01.818 PRE-OP EVALUATION: ICD-10-CM

## 2020-01-07 DIAGNOSIS — E78.5 DYSLIPIDEMIA: ICD-10-CM

## 2020-01-07 DIAGNOSIS — I10 ESSENTIAL HYPERTENSION: ICD-10-CM

## 2020-01-07 RX ORDER — LOSARTAN POTASSIUM 100 MG/1
100 TABLET ORAL DAILY
Qty: 90 TAB | Refills: 0 | Status: SHIPPED | OUTPATIENT
Start: 2020-01-07 | End: 2020-01-13 | Stop reason: SDUPTHER

## 2020-01-07 RX ORDER — ATORVASTATIN CALCIUM 40 MG/1
TABLET, FILM COATED ORAL
Qty: 90 TAB | Refills: 0 | Status: SHIPPED | OUTPATIENT
Start: 2020-01-07 | End: 2020-01-13 | Stop reason: SDUPTHER

## 2020-01-08 NOTE — TELEPHONE ENCOUNTER
Was the patient seen in the last year in this department? Yes LOV 03/25/19    Does patient have an active prescription for medications requested? No     Received Request Via: Pharmacy

## 2020-01-13 DIAGNOSIS — I10 ESSENTIAL HYPERTENSION: ICD-10-CM

## 2020-01-13 DIAGNOSIS — E78.5 DYSLIPIDEMIA: ICD-10-CM

## 2020-01-13 RX ORDER — ATORVASTATIN CALCIUM 40 MG/1
TABLET, FILM COATED ORAL
Qty: 100 TAB | Refills: 0 | Status: SHIPPED | OUTPATIENT
Start: 2020-01-13 | End: 2020-03-04 | Stop reason: SDUPTHER

## 2020-01-13 RX ORDER — LOSARTAN POTASSIUM 100 MG/1
100 TABLET ORAL DAILY
Qty: 100 TAB | Refills: 0 | Status: SHIPPED | OUTPATIENT
Start: 2020-01-13 | End: 2020-03-04 | Stop reason: SDUPTHER

## 2020-01-13 NOTE — TELEPHONE ENCOUNTER
Was the patient seen in the last year in this department? Yes    Does patient have an active prescription for medications requested? Yes    Received Request Via: Pharmacy Insurance [employer] 100 day supply required

## 2020-01-14 NOTE — TELEPHONE ENCOUNTER
Requested Prescriptions     Pending Prescriptions Disp Refills   • atorvastatin (LIPITOR) 40 MG Tab 100 Tab 0     Sig: TAKE ONE TABLET BY MOUTH EVERY NIGHT AT BEDTIME   • losartan (COZAAR) 100 MG Tab 100 Tab 0     Sig: Take 1 Tab by mouth every day.       JEANNE Kaminski.

## 2020-01-31 DIAGNOSIS — E11.9 TYPE 2 DIABETES MELLITUS WITHOUT COMPLICATION, WITHOUT LONG-TERM CURRENT USE OF INSULIN (HCC): ICD-10-CM

## 2020-01-31 NOTE — TELEPHONE ENCOUNTER
Received request via: Pharmacy    Was the patient seen in the last year in this department? Yes lov 3/25/19    Does the patient have an active prescription (recently filled or refills available) for medication(s) requested? No

## 2020-02-19 DIAGNOSIS — E11.9 TYPE 2 DIABETES MELLITUS WITHOUT COMPLICATION, WITHOUT LONG-TERM CURRENT USE OF INSULIN (HCC): ICD-10-CM

## 2020-02-20 DIAGNOSIS — E11.9 TYPE 2 DIABETES MELLITUS WITHOUT COMPLICATION, WITHOUT LONG-TERM CURRENT USE OF INSULIN (HCC): ICD-10-CM

## 2020-02-20 RX ORDER — GLYBURIDE 5 MG/1
5 TABLET ORAL 2 TIMES DAILY WITH MEALS
Qty: 200 TAB | Refills: 0 | Status: SHIPPED | OUTPATIENT
Start: 2020-02-20 | End: 2020-05-29

## 2020-02-20 RX ORDER — GLYBURIDE 5 MG/1
5 TABLET ORAL 2 TIMES DAILY WITH MEALS
Qty: 180 TAB | Refills: 0 | Status: SHIPPED | OUTPATIENT
Start: 2020-02-20 | End: 2020-02-20 | Stop reason: SDUPTHER

## 2020-02-20 NOTE — TELEPHONE ENCOUNTER
Received request via: Pharmacy Insurance [employer] 100 day supply required    Was the patient seen in the last year in this department? Yes LOV 03/25/19 PT HAS CHANGE INSURANCE TO [facility] A1C 9.2 09/11/18    Does the patient have an active prescription (recently filled or refills available) for medication(s) requested? Insurance [employer] 100 day supply required

## 2020-02-20 NOTE — TELEPHONE ENCOUNTER
Requested Prescriptions     Pending Prescriptions Disp Refills   • glyBURIDE (DIABETA) 5 MG Tab [Pharmacy Med Name: glyBURIDE 5 MG TABLET (D)] 180 Tab 0     Sig: Take 1 Tab by mouth 2 times a day, with meals for 90 days.       JEANNE Kaminski.

## 2020-02-20 NOTE — TELEPHONE ENCOUNTER
Received request via: Pharmacy    Was the patient seen in the last year in this department? Yes 3/25/19    Does the patient have an active prescription (recently filled or refills available) for medication(s) requested? No

## 2020-02-21 NOTE — TELEPHONE ENCOUNTER
Requested Prescriptions     Pending Prescriptions Disp Refills   • glyBURIDE (DIABETA) 5 MG Tab 200 Tab 0     Sig: Take 1 Tab by mouth 2 times a day, with meals for 100 days. PT NEED TO BE SEEN BY PCP FOR FUTURE REFILLS       JEANNE Kaminski.

## 2020-02-24 ENCOUNTER — OFFICE VISIT (OUTPATIENT)
Dept: MEDICAL GROUP | Facility: PHYSICIAN GROUP | Age: 65
End: 2020-02-24
Payer: MEDICARE

## 2020-02-24 ENCOUNTER — HOSPITAL ENCOUNTER (OUTPATIENT)
Facility: MEDICAL CENTER | Age: 65
End: 2020-02-24
Attending: NURSE PRACTITIONER
Payer: MEDICARE

## 2020-02-24 VITALS
TEMPERATURE: 98.7 F | OXYGEN SATURATION: 97 % | HEART RATE: 73 BPM | WEIGHT: 210 LBS | BODY MASS INDEX: 32.96 KG/M2 | DIASTOLIC BLOOD PRESSURE: 90 MMHG | HEIGHT: 67 IN | SYSTOLIC BLOOD PRESSURE: 160 MMHG

## 2020-02-24 DIAGNOSIS — R10.2 PELVIC PAIN: ICD-10-CM

## 2020-02-24 DIAGNOSIS — R82.90 ABNORMAL FINDING ON URINALYSIS: ICD-10-CM

## 2020-02-24 DIAGNOSIS — E11.9 TYPE 2 DIABETES MELLITUS WITHOUT COMPLICATION, WITHOUT LONG-TERM CURRENT USE OF INSULIN (HCC): ICD-10-CM

## 2020-02-24 DIAGNOSIS — M54.50 ACUTE LEFT-SIDED LOW BACK PAIN WITHOUT SCIATICA: ICD-10-CM

## 2020-02-24 LAB
APPEARANCE UR: NORMAL
BILIRUB UR STRIP-MCNC: NEGATIVE MG/DL
COLOR UR AUTO: YELLOW
GLUCOSE UR STRIP.AUTO-MCNC: 100 MG/DL
KETONES UR STRIP.AUTO-MCNC: NEGATIVE MG/DL
LEUKOCYTE ESTERASE UR QL STRIP.AUTO: NORMAL
NITRITE UR QL STRIP.AUTO: NEGATIVE
PH UR STRIP.AUTO: 6 [PH] (ref 5–8)
PROT UR QL STRIP: NORMAL MG/DL
RBC UR QL AUTO: NEGATIVE
SP GR UR STRIP.AUTO: 1.02
UROBILINOGEN UR STRIP-MCNC: 0.2 MG/DL

## 2020-02-24 PROCEDURE — 81002 URINALYSIS NONAUTO W/O SCOPE: CPT | Performed by: NURSE PRACTITIONER

## 2020-02-24 PROCEDURE — 87086 URINE CULTURE/COLONY COUNT: CPT

## 2020-02-24 PROCEDURE — 99214 OFFICE O/P EST MOD 30 MIN: CPT | Performed by: NURSE PRACTITIONER

## 2020-02-24 RX ORDER — METHYLPREDNISOLONE 4 MG/1
TABLET ORAL
Qty: 21 TAB | Refills: 0 | Status: SHIPPED | OUTPATIENT
Start: 2020-02-24 | End: 2020-05-28

## 2020-02-24 RX ORDER — KETOROLAC TROMETHAMINE 30 MG/ML
60 INJECTION, SOLUTION INTRAMUSCULAR; INTRAVENOUS ONCE
Status: COMPLETED | OUTPATIENT
Start: 2020-02-24 | End: 2020-02-24

## 2020-02-24 RX ORDER — TIZANIDINE 4 MG/1
4 TABLET ORAL EVERY 6 HOURS PRN
Qty: 60 TAB | Refills: 0 | Status: SHIPPED
Start: 2020-02-24 | End: 2020-09-02

## 2020-02-24 RX ADMIN — KETOROLAC TROMETHAMINE 60 MG: 30 INJECTION, SOLUTION INTRAMUSCULAR; INTRAVENOUS at 16:44

## 2020-02-24 ASSESSMENT — PATIENT HEALTH QUESTIONNAIRE - PHQ9: CLINICAL INTERPRETATION OF PHQ2 SCORE: 0

## 2020-02-24 NOTE — LETTER
February 24, 2020         Patient: Brenda Davidson   YOB: 1955   Date of Visit: 2/24/2020           To Whom it May Concern:    Brenda Davidson was seen in my clinic on 2/24/2020. She may return to work on Wednesday February 26, 2020.    If you have any questions or concerns, please don't hesitate to call.        Sincerely,           JEANNE Kaminski.  Electronically Signed

## 2020-02-25 DIAGNOSIS — R82.90 ABNORMAL FINDING ON URINALYSIS: ICD-10-CM

## 2020-02-25 NOTE — ASSESSMENT & PLAN NOTE
This is a new problem to the examiner. Patient reports that she began having left-sided low back pain that radiated around to her lower left abdomen about 2 weeks ago.  Patient states that initially she thought she hurt her lower left back.  States that when she woke up in the morning to get ready for work she was experiencing pain and it has not stopped since.  The patient denies any shooting down her left lower extremity, but does report radiating, shooting pain around her side to her abdomen.  States that due to the pain she has difficulty bending forward to  anything, is having difficulty getting dressed in the morning, and has missed work due to the pain.  Reports that she has used over-the-counter Advil, topical patches, heat, ice packs.  States that ice is the most beneficial for the pain.  Patient denies any symptoms of urinary tract infection including dysuria, urinary frequency, flank pain.  States that she has a baseline of nocturia 3-4 times per night.  Also has a history of diverticulosis.  Denies any loss of bowel or bladder or numbness and tingling in bilateral lower extremities.

## 2020-02-25 NOTE — ASSESSMENT & PLAN NOTE
Patient reports that she began having left-sided low back pain that radiated around to her lower left abdomen about 2 weeks ago.  Patient states that initially she thought she hurt her lower left back.  States that when she woke up in the morning to get ready for work she was experiencing pain and it has not stopped since.  The patient denies any shooting down her left lower extremity, but does report radiating, shooting pain around her side to her abdomen.  States that due to the pain she has difficulty bending forward to  anything, is having difficulty getting dressed in the morning, and has missed work due to the pain.  Reports that she has used over-the-counter Advil, topical patches, heat, ice packs.  States that ice is the most beneficial for the pain.  Patient denies any symptoms of urinary tract infection including dysuria, urinary frequency, flank pain.  States that she has a baseline of nocturia 3-4 times per night.  Also has a history of diverticulosis.  Denies any loss of bowel or bladder or numbness and tingling in bilateral lower extremities.

## 2020-02-25 NOTE — PROGRESS NOTES
CC:   Chief Complaint   Patient presents with   • Back Pain     lower back that radiates to ABD x 2 weeks     HISTORY OF THE PRESENT ILLNESS: Patient is a 64 y.o. female. This pleasant patient is here today to discuss acute low back pain present for 2 weeks.    Type 2 diabetes mellitus without complication  Chronic problem for the patient that is ongoing and uncontrolled.  The patient's last hemoglobin A1c was elevated at 9.2% on 9/11/2018 with an estimated average glucose of 217.  Patient states that she is compliant with metformin 1000 mg twice daily and glyburide 5 mg twice daily.  Reports that she has not checked her blood sugar this month, but prior to that her morning fasting blood sugars would range around 104.    Acute left-sided low back pain without sciatica  This is a new problem to the examiner. Patient reports that she began having left-sided low back pain that radiated around to her lower left abdomen about 2 weeks ago.  Patient states that initially she thought she hurt her lower left back.  States that when she woke up in the morning to get ready for work she was experiencing pain and it has not stopped since.  The patient denies any shooting down her left lower extremity, but does report radiating, shooting pain around her side to her abdomen.  States that due to the pain she has difficulty bending forward to  anything, is having difficulty getting dressed in the morning, and has missed work due to the pain.  Reports that she has used over-the-counter Advil, topical patches, heat, ice packs.  States that ice is the most beneficial for the pain.  Patient denies any symptoms of urinary tract infection including dysuria, urinary frequency, flank pain.  States that she has a baseline of nocturia 3-4 times per night.  Also has a history of diverticulosis.  Denies any loss of bowel or bladder or numbness and tingling in bilateral lower extremities.    Allergies: Codeine; Ace inhibitors; and Flagyl  [metronidazole]    Current Outpatient Medications Ordered in Epic   Medication Sig Dispense Refill   • methylPREDNISolone (MEDROL DOSEPAK) 4 MG Tablet Therapy Pack As directed on the packaging label. 21 Tab 0   • tizanidine (ZANAFLEX) 4 MG Tab Take 1 Tab by mouth every 6 hours as needed (left low back pain). 60 Tab 0   • glyBURIDE (DIABETA) 5 MG Tab Take 1 Tab by mouth 2 times a day, with meals for 100 days. PT NEED TO BE SEEN BY PCP FOR FUTURE REFILLS 200 Tab 0   • metformin (GLUCOPHAGE) 1000 MG tablet TAKE ONE TABLET BY MOUTH TWICE A DAY WITH MEALS 180 Tab 0   • atorvastatin (LIPITOR) 40 MG Tab TAKE ONE TABLET BY MOUTH EVERY NIGHT AT BEDTIME 100 Tab 0   • losartan (COZAAR) 100 MG Tab Take 1 Tab by mouth every day. 100 Tab 0   • CARTIA  MG CAPSULE SR 24 HR TAKE ONE CAPSULE BY MOUTH DAILY 90 Cap 3   • albuterol 108 (90 Base) MCG/ACT Aero Soln inhalation aerosol Inhale 2 Puffs by mouth every 6 hours as needed for Shortness of Breath. 8.5 g 2   • Blood Glucose Monitoring Suppl SUPPLIES MISC Test strips order: Test strips for Accu Check Compact meter. Sig: use once daily before breakfast and prn ssx high or low sugar #150 RF x 3 150 Strip 5     No current Epic-ordered facility-administered medications on file.      Past Medical History:   Diagnosis Date   • Acute left-sided low back pain without sciatica 2/24/2020   • Anesthesia     hard time waking up   • Chronic low back pain    • Dental disorder    • DM (diabetes mellitus) (HCC)     oral medication   • Hypercholesteremia    • Hypertension    • Mass of cecum 9/15    tubulovillous adenoma polyp   • Obesity (BMI 30.0-34.9)    • Other specified disorder of intestines      Past Surgical History:   Procedure Laterality Date   • CHOLECYSTECTOMY  8/21/2015    Procedure: LAPAROSCOPIC CHOLECYSTECTOMY;  Surgeon: Kris Garrison M.D.;  Location: SURGERY [facility];  Service:    • BOWEL RESECTION LAPAROSCOPIC  8/21/2015    Procedure: LAPAROSCOPIC ILEOCOLIC RESECTION;   "Surgeon: Kris Garrison M.D.;  Location: SURGERY [facility];  Service:    • COLONOSCOPY WITH POLYP  8/13/15    multiple polyps, tumor cecum, diverticulosis, int hemorrhoids   • APPENDECTOMY  2015   • ARTHROSCOPY, KNEE  3/14    torn meniscus   • PELVISCOPY  4/6/2012    Performed by HILDA MACE at SURGERY SAME DAY [facility] ORS   • LYSIS ADHESIONS GYN  4/6/2012    Performed by HILDA MACE at SURGERY SAME DAY [facility] ORS   • COLONOSCOPY WITH POLYP  2012    3 polyps - hyperplastic, benign serrated, tubulovillous adenoma , scattered diverticula sigmoid colon - [facility]   • OTHER      TONSILLECTOMY   • OTHER      REPAIR LT URETER AGE 19     Social History     Tobacco Use   • Smoking status: Never Smoker   • Smokeless tobacco: Never Used   Substance Use Topics   • Alcohol use: No     Alcohol/week: 0.0 oz   • Drug use: No     Social History     Social History Narrative   • Not on file     Family History   Problem Relation Age of Onset   • Cancer Mother         breast cancer   • Lung Disease Mother         emphysema   • Hypertension Father    • Alzheimer's Disease Sister    • Dementia Sister    • Hypertension Brother    • No Known Problems Sister    • No Known Problems Sister    • Hypertension Brother      ROS:   Constitutional: No Fevers, Chills  Eyes: No eye pain  ENT: No sore throat  Resp: No Shortness of breath  CV: No Chest pain  GI: No Nausea/Vomiting  : Positive for nocturia.  MSK: Positive for left lower back pain that radiates around left abdomen.  No weakness  Skin: No rashes  Neuro: No Headaches  Psych: No Suicidal ideations    All remaining systems reviewed and found to be negative, except as stated above.      Exam: /90 (BP Location: Left arm, Patient Position: Sitting, BP Cuff Size: Large adult)   Pulse 73   Temp 37.1 °C (98.7 °F) (Temporal)   Ht 1.702 m (5' 7\")   Wt 95.3 kg (210 lb)   SpO2 97%  Body mass index is 32.89 kg/m².    General: Mild distress related to left low back pain.  " Well nourished, well developed female, awake and conversant.  Eyes: Normal conjunctiva, anicteric.  Round symmetrical pupils.  ENT: Hearing grossly intact.  No nasal discharge.  Neck: Neck is supple.  No masses or thyromegaly.  CV: No lower extremity edema.  Respiratory: Respirations are nonlabored.  No wheezing.  Abdomen: Non-Distended.  Skin: Warm.  No rashes or ulcers.  MSK: Normal ambulation.  No clubbing or cyanosis.  Neuro: Sensation and CN II-XII grossly normal.  Psych: Alert and oriented.  Cooperative, appropriate mood and affect, normal judgment.    Assessment/Plan:  1. Type 2 diabetes mellitus without complication, without long-term current use of insulin (HCC)  Chronic, ongoing and uncontrolled.  Continue metformin 1000 mg twice daily and glyburide 5 mg twice daily.  Continue checking morning fasting blood sugars, keep log and bring to future appointment.  Patient will follow-up on 3/24/2020 for management of this chronic condition.    2. Acute left-sided low back pain without sciatica  3. Pelvic pain  New problem for the patient that began approximately 2 weeks ago without injury.  POCT urinalysis slightly cloudy, 100 glucose, trace protein, small leukocyte Estrace.  Urine to be sent for culture.  Toradol injection given in clinic.  Medrol Dosepak and tizanidine sent to patient's pharmacy.  Do not take additional NSAIDs while on steroid.  Letter provided for patient's employer.  Return to be seen if symptoms worsen or fail to improve despite interventions.  Continue to rest, apply ice as this is helpful.  - POCT Urinalysis  - ketorolac (TORADOL) injection 60 mg  - methylPREDNISolone (MEDROL DOSEPAK) 4 MG Tablet Therapy Pack; As directed on the packaging label.  Dispense: 21 Tab; Refill: 0  - tizanidine (ZANAFLEX) 4 MG Tab; Take 1 Tab by mouth every 6 hours as needed (left low back pain).  Dispense: 60 Tab; Refill: 0    4. Abnormal finding on urinalysis  Will notify patient of results of urine culture  through my chart when received.  If indicated, will send antibiotics to patient's pharmacy.  - URINE CULTURE(NEW); Future    Educated in proper administration of medication(s) ordered today including safety, possible SE, risks, benefits, rationale and alternatives to therapy.   Supportive care, differential diagnoses, and indications for immediate follow-up discussed with patient.    Pathogenesis of diagnosis discussed including typical length and natural progression.    Instructed to return to clinic or nearest emergency department for any change in condition, further concerns, or worsening of symptoms.  Patient states understanding of the plan of care and discharge instructions.    Return in about 1 month (around 3/24/2020), or if symptoms worsen or fail to improve, for Diabetes.    I have placed the below orders and discussed them with an approved delegating provider. The MA is performing the below orders under the direction of Dr. Segura.    Please note that this dictation was created using voice recognition software. I have made every reasonable attempt to correct obvious errors, but I expect that there are errors of grammar and possibly content that I did not discover before finalizing the note.

## 2020-02-25 NOTE — ASSESSMENT & PLAN NOTE
Chronic problem for the patient that is ongoing and uncontrolled.  The patient's last hemoglobin A1c was elevated at 9.2% on 9/11/2018 with an estimated average glucose of 217.  Patient states that she is compliant with metformin 1000 mg twice daily and glyburide 5 mg twice daily.  Reports that she has not checked her blood sugar this month, but prior to that her morning fasting blood sugars would range around 104.

## 2020-02-27 LAB
BACTERIA UR CULT: NORMAL
SIGNIFICANT IND 70042: NORMAL
SITE SITE: NORMAL
SOURCE SOURCE: NORMAL

## 2020-03-04 DIAGNOSIS — E11.9 TYPE 2 DIABETES MELLITUS WITHOUT COMPLICATION, WITHOUT LONG-TERM CURRENT USE OF INSULIN (HCC): ICD-10-CM

## 2020-03-04 DIAGNOSIS — Z11.59 NEED FOR HEPATITIS C SCREENING TEST: ICD-10-CM

## 2020-03-04 DIAGNOSIS — Z72.89 OTHER PROBLEMS RELATED TO LIFESTYLE: ICD-10-CM

## 2020-03-04 DIAGNOSIS — E78.5 DYSLIPIDEMIA: ICD-10-CM

## 2020-03-04 DIAGNOSIS — I10 ESSENTIAL HYPERTENSION: ICD-10-CM

## 2020-03-04 DIAGNOSIS — E55.9 VITAMIN D INSUFFICIENCY: ICD-10-CM

## 2020-03-04 RX ORDER — ATORVASTATIN CALCIUM 40 MG/1
TABLET, FILM COATED ORAL
Qty: 100 TAB | Refills: 0 | Status: SHIPPED | OUTPATIENT
Start: 2020-03-04 | End: 2020-09-21 | Stop reason: SDUPTHER

## 2020-03-04 RX ORDER — LOSARTAN POTASSIUM 100 MG/1
100 TABLET ORAL DAILY
Qty: 100 TAB | Refills: 3 | Status: SHIPPED
Start: 2020-03-04 | End: 2020-10-16

## 2020-03-05 NOTE — TELEPHONE ENCOUNTER
Received request via: Patient    Was the patient seen in the last year in this department? Yes 2/24/20    Does the patient have an active prescription (recently filled or refills available) for medication(s) requested? Ceci Gutierrez would like a 100 day supply for all these medication's.

## 2020-03-05 NOTE — TELEPHONE ENCOUNTER
Requested Prescriptions     Pending Prescriptions Disp Refills   • metformin (GLUCOPHAGE) 1000 MG tablet 200 Tab 3     Sig: Take 1 Tab by mouth 2 times a day, with meals.   • losartan (COZAAR) 100 MG Tab 100 Tab 3     Sig: Take 1 Tab by mouth every day.   • atorvastatin (LIPITOR) 40 MG Tab 100 Tab 0     Sig: TAKE ONE TABLET BY MOUTH EVERY NIGHT AT BEDTIME       JEANNE Kaminski.

## 2020-03-05 NOTE — PROGRESS NOTES
1. Dyslipidemia  - CBC WITH DIFFERENTIAL; Future  - Comp Metabolic Panel; Future  - Lipid Profile; Future    2. Essential hypertension  - CBC WITH DIFFERENTIAL; Future  - Comp Metabolic Panel; Future  - TSH WITH REFLEX TO FT4; Future    3. Type 2 diabetes mellitus without complication, without long-term current use of insulin (HCC)  - HEMOGLOBIN A1C; Future  - CBC WITH DIFFERENTIAL; Future  - Comp Metabolic Panel; Future  - Lipid Profile; Future  - MICROALBUMIN CREAT RATIO URINE; Future    4. Vitamin D insufficiency  - VITAMIN D,25 HYDROXY; Future    5. Need for hepatitis C screening test  - HCV Scrn ( 3079-8606 [employer]); Future    6. Other problems related to lifestyle  - HCV Scrn ( 7924-3690 [employer]); Future

## 2020-05-06 ENCOUNTER — PATIENT OUTREACH (OUTPATIENT)
Dept: HEALTH INFORMATION MANAGEMENT | Facility: OTHER | Age: 65
End: 2020-05-06

## 2020-05-06 NOTE — PROGRESS NOTES
Outcome: Left Message    Please transfer to Patient Outreach Team at [phone] when patient returns call.    [facility] Verified: yes    Attempt # 1

## 2020-05-27 ENCOUNTER — TELEPHONE (OUTPATIENT)
Dept: MEDICAL GROUP | Facility: PHYSICIAN GROUP | Age: 65
End: 2020-05-27

## 2020-05-27 NOTE — TELEPHONE ENCOUNTER
Future Appointments       Provider Department Center    5/28/2020 2:40 PM FILI Kaminski [facility] [place]        ESTABLISHED PATIENT PRE-VISIT PLANNING     Patient was NOT contacted to complete PVP.    1.  Reviewed notes from the last few office visits within the medical group: Yes    2.  If any orders were placed at last visit or intended to be done for this visit (i.e. 6 mos follow-up), do we have Results/Consult Notes?        •  Labs - Labs ordered, completed on 2/24/20 and results are in chart.       •  Imaging - Imaging was not ordered at last office visit.       •  Referrals - No referrals were ordered at last office visit.    3. Is this appointment scheduled as a Hospital Follow-Up? No    4.  Immunizations were updated in Epic using WebIZ?: Epic matches WebIZ       •  Web Iz Recommendations: PNEUMOVAX (PPSV23), TDAP and SHINGRIX (Shingles)    5.  Patient is due for the following Health Maintenance Topics:   Health Maintenance Due   Topic Date Due   • Annual Wellness Visit  1955   • HEPATITIS C SCREENING  1955   • DIABETES MONOFILAMENT / LE EXAM  1955   • IMM HEP B VACCINE (1 of 3 - Risk 3-dose series) 05/14/1973   • IMM DTaP/Tdap/Td Vaccine (1 - Tdap) 05/14/1973   • IMM ZOSTER VACCINES (1 of 2) 05/14/2004   • RETINAL SCREENING  10/13/2015   • MAMMOGRAM  06/29/2016   • A1C SCREENING  03/11/2019   • BONE DENSITY  05/14/2019   • FASTING LIPID PROFILE  09/11/2019   • URINE ACR / MICROALBUMIN  09/11/2019   • SERUM CREATININE  09/11/2019       6. Orders for overdue Health Maintenance topics pended in Pre-Charting? N\A    7.  AHA (MDX) form printed for Provider? YES    8.  Patient was NOT informed to arrive 15 min prior to their scheduled appointment and bring in their medication bottles.

## 2020-05-28 ENCOUNTER — OFFICE VISIT (OUTPATIENT)
Dept: MEDICAL GROUP | Facility: PHYSICIAN GROUP | Age: 65
End: 2020-05-28
Payer: MEDICARE

## 2020-05-28 VITALS
HEIGHT: 67 IN | RESPIRATION RATE: 16 BRPM | WEIGHT: 216 LBS | HEART RATE: 71 BPM | DIASTOLIC BLOOD PRESSURE: 88 MMHG | SYSTOLIC BLOOD PRESSURE: 134 MMHG | OXYGEN SATURATION: 94 % | BODY MASS INDEX: 33.9 KG/M2 | TEMPERATURE: 98.6 F

## 2020-05-28 DIAGNOSIS — Z78.0 POSTMENOPAUSAL STATUS (AGE-RELATED) (NATURAL): ICD-10-CM

## 2020-05-28 DIAGNOSIS — N81.89 OTHER FEMALE GENITAL PROLAPSE: ICD-10-CM

## 2020-05-28 DIAGNOSIS — R19.7 FREQUENT DIARRHEA: ICD-10-CM

## 2020-05-28 DIAGNOSIS — Z23 NEED FOR VACCINATION: ICD-10-CM

## 2020-05-28 DIAGNOSIS — N95.1 MENOPAUSAL STATE: ICD-10-CM

## 2020-05-28 DIAGNOSIS — Z12.31 ENCOUNTER FOR SCREENING MAMMOGRAM FOR BREAST CANCER: ICD-10-CM

## 2020-05-28 DIAGNOSIS — N81.2 CERVIX PROLAPSED INTO VAGINA: ICD-10-CM

## 2020-05-28 DIAGNOSIS — R10.9 ABDOMINAL CRAMPING: ICD-10-CM

## 2020-05-28 DIAGNOSIS — E11.9 TYPE 2 DIABETES MELLITUS WITHOUT COMPLICATION, WITHOUT LONG-TERM CURRENT USE OF INSULIN (HCC): ICD-10-CM

## 2020-05-28 PROBLEM — K59.1 FUNCTIONAL DIARRHEA: Status: ACTIVE | Noted: 2020-05-28

## 2020-05-28 PROCEDURE — 99214 OFFICE O/P EST MOD 30 MIN: CPT | Performed by: NURSE PRACTITIONER

## 2020-05-28 PROCEDURE — 8041 PR SCP AHA: Performed by: NURSE PRACTITIONER

## 2020-05-28 RX ORDER — CLOSTRIDIUM TETANI TOXOID ANTIGEN (FORMALDEHYDE INACTIVATED), CORYNEBACTERIUM DIPHTHERIAE TOXOID ANTIGEN (FORMALDEHYDE INACTIVATED), BORDETELLA PERTUSSIS TOXOID ANTIGEN (GLUTARALDEHYDE INACTIVATED), BORDETELLA PERTUSSIS FILAMENTOUS HEMAGGLUTININ ANTIGEN (FORMALDEHYDE INACTIVATED), BORDETELLA PERTUSSIS PERTACTIN ANTIGEN, AND BORDETELLA PERTUSSIS FIMBRIAE 2/3 ANTIGEN 5; 2; 2.5; 5; 3; 5 [LF]/.5ML; [LF]/.5ML; UG/.5ML; UG/.5ML; UG/.5ML; UG/.5ML
0.5 INJECTION, SUSPENSION INTRAMUSCULAR ONCE
Qty: 0.5 ML | Refills: 0 | Status: SHIPPED | OUTPATIENT
Start: 2020-05-28 | End: 2020-05-28

## 2020-05-28 ASSESSMENT — FIBROSIS 4 INDEX: FIB4 SCORE: 0.83

## 2020-05-28 NOTE — PROGRESS NOTES
CC:   Chief Complaint   Patient presents with   • Diarrhea     x months   • Hemorrhoids     from excessive BM   • Referral Needed     Prolapsed Vagina     HISTORY OF THE PRESENT ILLNESS: Patient is a 65 y.o. female. This pleasant patient is here today to discuss chronic diarrhea and request referrals for GI and gynecology.    Health Maintenance: Reviewed    Annual Health Assessment Questions:    1.  Are you currently engaging in any exercise or physical activity? Yes    2.  How would you describe your mood or emotional well-being today? fair    3.  Have you had any falls in the last year? No    4.  Have you noticed any problems with your balance or had difficulty walking? No    5.  In the last six months have you experienced any leakage of urine? No    6. DPA/Advanced Directive: Patient does not have an Advanced Directive.  A packet and workshop information was given on Advanced Directives.    Cervix prolapsed into vagina  Chronic, uncontrolled.  The patient was previously followed by gynecology, Dr. Yee for this issue.  Patient states that Dr. Yee did not make her feel comfortable about her options for surgical intervention.  Patient states that she does not want mesh for this issue and might be okay with a hysterectomy, but would like a referral to a new gynecologist to discuss options.  Patient reports that she is experiencing discomfort related to the prolapse when coughing or wiping after going to the bathroom.  Also states that this has interfered with her quality of life as it is causing her to hesitate to date because she is embarrassed about the issue.    Frequent diarrhea  New problem to examiner.  Chronic problem for the patient that has significantly worsened over the last couple of months.  Patient reports that she has had chronic diarrhea for years and also has a history of C. difficile.  She does not believe she has a current C. difficile infection.  States that over the last couple of  "months she has had been having 5-6 watery bowel movements per day.  States that everything she eats \"immediately goes right through\".  States that she cannot eat out without having to jump and run to the bathroom to have a bowel movement, bowel movements are always watery.  States that it does not matter what she eats, all foods are a problem.  The frequent bowel movements have caused external hemorrhoids for which she is using Preparation H for.  She has tried over-the-counter Imodium and Pepto-Bismol with no improvement in symptoms.  Patient also states that she gets abdominal cramping that is a signal that she \"has to run immediately to the bathroom and everything gushes out\".  Denies seeing any blood in the toilet, but does have blood on the toilet paper every once a while after wiping.  The patient is requesting referral to GI for further work-up and management, believes she may have IBS.  Denies any fevers or chills.    Type 2 diabetes mellitus without complication  Chronic, uncontrolled.  The patient's last hemoglobin A1c was elevated at 9.2% on 9/11/2018. Patient states that she is compliant with metformin 1000 mg twice daily and glyburide 5 mg twice daily. States that she continues to check blood sugars and they average around 140. Labs were ordered at previous appointment, are not completed yet.    Other female genital prolapse  Chronic, uncontrolled.  The patient was previously followed by gynecology, Dr. Yee for this issue.  Patient states that Dr. Yee did not make her feel comfortable about her options for surgical intervention.  Patient states that she does not want mesh for this issue and might be okay with a hysterectomy, but would like a referral to a new gynecologist to discuss options.  Patient reports that she is experiencing discomfort related to the prolapse when coughing or wiping after going to the bathroom.  Also states that this has interfered with her quality of life as it " is causing her to hesitate to date because she is embarrassed about the issue.    Allergies: Codeine; Ace inhibitors; and Flagyl [metronidazole]    Current Outpatient Medications Ordered in Epic   Medication Sig Dispense Refill   • tetanus-dipth-acell pertussis (ADACEL) 5-2-15.5 LF-MCG/0.5 Suspension 0.5 mL by Intramuscular route Once for 1 dose. 0.5 mL 0   • Zoster Vac Recomb Adjuvanted (SHINGRIX) 50 MCG/0.5ML Recon Susp 0.5 mL by Intramuscular route Once for 1 dose. 0.5 mL 0   • metformin (GLUCOPHAGE) 1000 MG tablet Take 1 Tab by mouth 2 times a day, with meals. 200 Tab 3   • losartan (COZAAR) 100 MG Tab Take 1 Tab by mouth every day. 100 Tab 3   • atorvastatin (LIPITOR) 40 MG Tab TAKE ONE TABLET BY MOUTH EVERY NIGHT AT BEDTIME 100 Tab 0   • tizanidine (ZANAFLEX) 4 MG Tab Take 1 Tab by mouth every 6 hours as needed (left low back pain). 60 Tab 0   • glyBURIDE (DIABETA) 5 MG Tab Take 1 Tab by mouth 2 times a day, with meals for 100 days. PT NEED TO BE SEEN BY PCP FOR FUTURE REFILLS 200 Tab 0   • CARTIA  MG CAPSULE SR 24 HR TAKE ONE CAPSULE BY MOUTH DAILY 90 Cap 3   • albuterol 108 (90 Base) MCG/ACT Aero Soln inhalation aerosol Inhale 2 Puffs by mouth every 6 hours as needed for Shortness of Breath. 8.5 g 2   • Blood Glucose Monitoring Suppl SUPPLIES MISC Test strips order: Test strips for Accu Check Compact meter. Sig: use once daily before breakfast and prn ssx high or low sugar #150 RF x 3 150 Strip 5     No current Epic-ordered facility-administered medications on file.      Past Medical History:   Diagnosis Date   • Acute left-sided low back pain without sciatica 2/24/2020   • Anesthesia     hard time waking up   • Chronic low back pain    • Dental disorder    • DM (diabetes mellitus) (HCC)     oral medication   • Hypercholesteremia    • Hypertension    • Mass of cecum 9/15    tubulovillous adenoma polyp   • Obesity (BMI 30.0-34.9)    • Other specified disorder of intestines      Past Surgical History:    Procedure Laterality Date   • CHOLECYSTECTOMY  8/21/2015    Procedure: LAPAROSCOPIC CHOLECYSTECTOMY;  Surgeon: Kris Garrison M.D.;  Location: SURGERY [facility];  Service:    • BOWEL RESECTION LAPAROSCOPIC  8/21/2015    Procedure: LAPAROSCOPIC ILEOCOLIC RESECTION;  Surgeon: Kris Garrison M.D.;  Location: SURGERY [facility];  Service:    • COLONOSCOPY WITH POLYP  8/13/15    multiple polyps, tumor cecum, diverticulosis, int hemorrhoids   • APPENDECTOMY  2015   • ARTHROSCOPY, KNEE  3/14    torn meniscus   • PELVISCOPY  4/6/2012    Performed by HILDA MACE at SURGERY SAME DAY [facility]   • LYSIS ADHESIONS GYN  4/6/2012    Performed by HILDA MACE at SURGERY SAME DAY [facility]   • COLONOSCOPY WITH POLYP  2012    3 polyps - hyperplastic, benign serrated, tubulovillous adenoma , scattered diverticula sigmoid colon - [facility]   • OTHER      TONSILLECTOMY   • OTHER      REPAIR LT URETER AGE 19     Social History     Tobacco Use   • Smoking status: Never Smoker   • Smokeless tobacco: Never Used   Substance Use Topics   • Alcohol use: No     Alcohol/week: 0.0 oz   • Drug use: No     Social History     Social History Narrative   • Not on file     Family History   Problem Relation Age of Onset   • Cancer Mother         breast cancer   • Lung Disease Mother         emphysema   • Hypertension Father    • Alzheimer's Disease Sister    • Dementia Sister    • Hypertension Brother    • No Known Problems Sister    • No Known Problems Sister    • Hypertension Brother      ROS:   Constitutional: No fevers, chills, malaise/fatigue.  Eyes: No eye pain.  ENT: No sore throat, congestion.   Resp: No cough, shortness of breath.  CV: No chest pain, leg swelling, palpitations.  GI: + Chronic diarrhea.  No nausea/vomiting, abdominal pain, constipation.  : + Cervix prolapse.  No dysuria, hematuria.  MSK: No weakness.  Skin: No rashes.  Neuro: No dizziness, weakness, headaches.  Psych: No suicidal ideations.    All  "remaining systems reviewed and found to be negative, except as stated above.       Exam: /88 (BP Location: Left arm, Patient Position: Sitting, BP Cuff Size: Large adult)   Pulse 71   Temp 37 °C (98.6 °F) (Temporal)   Resp 16   Ht 1.702 m (5' 7\")   Wt 98 kg (216 lb)   SpO2 94%  Body mass index is 33.83 kg/m².    General: Well nourished, well developed female in NAD, awake and conversant.  Eyes: Normal conjunctiva, anicteric.  Round symmetrical pupils.  ENT: Hearing grossly intact.  No nasal discharge.  Neck: Neck is supple.  No masses or thyromegaly.  CV: No lower extremity edema.  Respiratory: Respirations are nonlabored.  No wheezing.  Abdomen: Non-Distended.  Skin: Warm.  No rashes or ulcers.  MSK: Normal ambulation.  No clubbing or cyanosis.  Neuro: Sensation and CN II-XII grossly normal.  Psych: Alert and oriented.  Cooperative, appropriate mood and affect, normal judgment.    Assessment/Plan:  1. Type 2 diabetes mellitus without complication, without long-term current use of insulin (HCC)  Chronic, uncontrolled.  Continue metformin 1000 mg twice daily and glyburide 5 mg twice daily, does not need refill at this time.  Updated lab orders previously provided to patient, reminded patient to complete fasting labs.    2. Frequent diarrhea  3. Abdominal cramping  Chronic, worsening.  Patient is requesting referral to GI for work-up and management for this issue.  Patient is concerned that she has IBS.  States that frequent bowel movements of 5-6 watery episodes per day is interfering with quality of life.  - REFERRAL TO GASTROENTEROLOGY    4. Cervix prolapsed into vagina  5. Other female genital prolapse  Chronic, uncontrolled.  Patient is requesting referral to a new gynecologist, previously seen by Dr. Yee but would like a new provider.  Reports that prolapse is interfering with quality of life and is ready to discuss surgical options.  - REFERRAL TO GYNECOLOGY    6. Encounter for screening " mammogram for breast cancer  - MA-SCREENING MAMMO BILAT W/CAD; Future    7. Postmenopausal status (age-related) (natural)  8. Menopausal state  - DS-BONE DENSITY STUDY (DEXA)    9. Need for vaccination  - tetanus-dipth-acell pertussis (ADACEL) 5-2-15.5 LF-MCG/0.5 Suspension; 0.5 mL by Intramuscular route Once for 1 dose.  Dispense: 0.5 mL; Refill: 0  - Zoster Vac Recomb Adjuvanted (SHINGRIX) 50 MCG/0.5ML Recon Susp; 0.5 mL by Intramuscular route Once for 1 dose.  Dispense: 0.5 mL; Refill: 0    Educated in proper administration of medication(s) ordered today including safety, possible SE, risks, benefits, rationale and alternatives to therapy.   Supportive care, differential diagnoses, and indications for immediate follow-up discussed with patient.    Pathogenesis of diagnosis discussed including typical length and natural progression.    Instructed to return to clinic or nearest emergency department for any change in condition, further concerns, or worsening of symptoms.  Patient states understanding of the plan of care and discharge instructions.    Return if symptoms worsen or fail to improve.    Please note that this dictation was created using voice recognition software. I have made every reasonable attempt to correct obvious errors, but I expect that there are errors of grammar and possibly content that I did not discover before finalizing the note.

## 2020-05-28 NOTE — ASSESSMENT & PLAN NOTE
Chronic, uncontrolled.  The patient's last hemoglobin A1c was elevated at 9.2% on 9/11/2018. Patient states that she is compliant with metformin 1000 mg twice daily and glyburide 5 mg twice daily. States that she continues to check blood sugars and they average around 140. Labs were ordered at previous appointment, are not completed yet.

## 2020-05-28 NOTE — ASSESSMENT & PLAN NOTE
"New problem to examiner.  Chronic problem for the patient that has significantly worsened over the last couple of months.  Patient reports that she has had chronic diarrhea for years and also has a history of C. difficile.  She does not believe she has a current C. difficile infection.  States that over the last couple of months she has had been having 5-6 watery bowel movements per day.  States that everything she eats \"immediately goes right through\".  States that she cannot eat out without having to jump and run to the bathroom to have a bowel movement, bowel movements are always watery.  States that it does not matter what she eats, all foods are a problem.  The frequent bowel movements have caused external hemorrhoids for which she is using Preparation H for.  She has tried over-the-counter Imodium and Pepto-Bismol with no improvement in symptoms.  Patient also states that she gets abdominal cramping that is a signal that she \"has to run immediately to the bathroom and everything gushes out\".  Denies seeing any blood in the toilet, but does have blood on the toilet paper every once a while after wiping.  The patient is requesting referral to GI for further work-up and management, believes she may have IBS.  Denies any fevers or chills.  "

## 2020-05-28 NOTE — ASSESSMENT & PLAN NOTE
Chronic, uncontrolled.  The patient was previously followed by gynecology, Dr. Yee for this issue.  Patient states that Dr. Yee did not make her feel comfortable about her options for surgical intervention.  Patient states that she does not want mesh for this issue and might be okay with a hysterectomy, but would like a referral to a new gynecologist to discuss options.  Patient reports that she is experiencing discomfort related to the prolapse when coughing or wiping after going to the bathroom.  Also states that this has interfered with her quality of life as it is causing her to hesitate to date because she is embarrassed about the issue.

## 2020-05-29 DIAGNOSIS — E11.9 TYPE 2 DIABETES MELLITUS WITHOUT COMPLICATION, WITHOUT LONG-TERM CURRENT USE OF INSULIN (HCC): ICD-10-CM

## 2020-05-29 RX ORDER — GLYBURIDE 5 MG/1
5 TABLET ORAL 2 TIMES DAILY WITH MEALS
Qty: 200 TAB | Refills: 0 | Status: SHIPPED
Start: 2020-05-29 | End: 2020-10-16

## 2020-05-29 NOTE — TELEPHONE ENCOUNTER
Requested Prescriptions     Pending Prescriptions Disp Refills   • glyBURIDE (DIABETA) 5 MG Tab [Pharmacy Med Name: glyBURIDE 5 MG TABLET (D)] 200 Tab 0     Sig: Take 1 Tab by mouth 2 times a day, with meals. Please update labs before next refill       JEANNE Kaminski.

## 2020-06-17 ENCOUNTER — HOSPITAL ENCOUNTER (OUTPATIENT)
Dept: LAB | Facility: MEDICAL CENTER | Age: 65
End: 2020-06-17
Attending: PHYSICIAN ASSISTANT
Payer: MEDICARE

## 2020-06-17 LAB
BASOPHILS # BLD AUTO: 0.5 % (ref 0–1.8)
BASOPHILS # BLD: 0.04 K/UL (ref 0–0.12)
EOSINOPHIL # BLD AUTO: 0.26 K/UL (ref 0–0.51)
EOSINOPHIL NFR BLD: 3 % (ref 0–6.9)
ERYTHROCYTE [DISTWIDTH] IN BLOOD BY AUTOMATED COUNT: 47.7 FL (ref 35.9–50)
HCT VFR BLD AUTO: 38.5 % (ref 37–47)
HGB BLD-MCNC: 12.3 G/DL (ref 12–16)
IMM GRANULOCYTES # BLD AUTO: 0.02 K/UL (ref 0–0.11)
IMM GRANULOCYTES NFR BLD AUTO: 0.2 % (ref 0–0.9)
LYMPHOCYTES # BLD AUTO: 3.88 K/UL (ref 1–4.8)
LYMPHOCYTES NFR BLD: 44.9 % (ref 22–41)
MCH RBC QN AUTO: 28.9 PG (ref 27–33)
MCHC RBC AUTO-ENTMCNC: 31.9 G/DL (ref 33.6–35)
MCV RBC AUTO: 90.4 FL (ref 81.4–97.8)
MONOCYTES # BLD AUTO: 0.66 K/UL (ref 0–0.85)
MONOCYTES NFR BLD AUTO: 7.6 % (ref 0–13.4)
NEUTROPHILS # BLD AUTO: 3.79 K/UL (ref 2–7.15)
NEUTROPHILS NFR BLD: 43.8 % (ref 44–72)
NRBC # BLD AUTO: 0 K/UL
NRBC BLD-RTO: 0 /100 WBC
PLATELET # BLD AUTO: 325 K/UL (ref 164–446)
PMV BLD AUTO: 10.5 FL (ref 9–12.9)
RBC # BLD AUTO: 4.26 M/UL (ref 4.2–5.4)
WBC # BLD AUTO: 8.7 K/UL (ref 4.8–10.8)

## 2020-06-17 PROCEDURE — 82784 ASSAY IGA/IGD/IGG/IGM EACH: CPT

## 2020-06-17 PROCEDURE — 85025 COMPLETE CBC W/AUTO DIFF WBC: CPT

## 2020-06-17 PROCEDURE — 80053 COMPREHEN METABOLIC PANEL: CPT

## 2020-06-17 PROCEDURE — 36415 COLL VENOUS BLD VENIPUNCTURE: CPT

## 2020-06-17 PROCEDURE — 84439 ASSAY OF FREE THYROXINE: CPT

## 2020-06-17 PROCEDURE — 83516 IMMUNOASSAY NONANTIBODY: CPT

## 2020-06-17 PROCEDURE — 84443 ASSAY THYROID STIM HORMONE: CPT

## 2020-06-18 LAB
ALBUMIN SERPL BCP-MCNC: 4.3 G/DL (ref 3.2–4.9)
ALBUMIN/GLOB SERPL: 1.2 G/DL
ALP SERPL-CCNC: 61 U/L (ref 30–99)
ALT SERPL-CCNC: 34 U/L (ref 2–50)
ANION GAP SERPL CALC-SCNC: 18 MMOL/L (ref 7–16)
AST SERPL-CCNC: 27 U/L (ref 12–45)
BILIRUB SERPL-MCNC: 0.3 MG/DL (ref 0.1–1.5)
BUN SERPL-MCNC: 44 MG/DL (ref 8–22)
CALCIUM SERPL-MCNC: 9.9 MG/DL (ref 8.5–10.5)
CHLORIDE SERPL-SCNC: 102 MMOL/L (ref 96–112)
CO2 SERPL-SCNC: 17 MMOL/L (ref 20–33)
CREAT SERPL-MCNC: 1.62 MG/DL (ref 0.5–1.4)
GLOBULIN SER CALC-MCNC: 3.6 G/DL (ref 1.9–3.5)
GLUCOSE SERPL-MCNC: 189 MG/DL (ref 65–99)
POTASSIUM SERPL-SCNC: 4.8 MMOL/L (ref 3.6–5.5)
PROT SERPL-MCNC: 7.9 G/DL (ref 6–8.2)
SODIUM SERPL-SCNC: 137 MMOL/L (ref 135–145)
T4 FREE SERPL-MCNC: 1.15 NG/DL (ref 0.93–1.7)
TSH SERPL DL<=0.005 MIU/L-ACNC: 3.68 UIU/ML (ref 0.38–5.33)

## 2020-06-19 LAB — TTG IGA SER IA-ACNC: <2 U/ML (ref 0–3)

## 2020-06-20 ENCOUNTER — HOSPITAL ENCOUNTER (OUTPATIENT)
Facility: MEDICAL CENTER | Age: 65
End: 2020-06-20
Attending: PHYSICIAN ASSISTANT
Payer: MEDICARE

## 2020-06-20 LAB — IGA SERPL-MCNC: 310 MG/DL (ref 68–408)

## 2020-06-20 PROCEDURE — 87045 FECES CULTURE AEROBIC BACT: CPT

## 2020-06-20 PROCEDURE — 87046 STOOL CULTR AEROBIC BACT EA: CPT

## 2020-06-20 PROCEDURE — 83993 ASSAY FOR CALPROTECTIN FECAL: CPT

## 2020-06-20 PROCEDURE — 87899 AGENT NOS ASSAY W/OPTIC: CPT

## 2020-06-20 PROCEDURE — 87324 CLOSTRIDIUM AG IA: CPT | Mod: XU

## 2020-06-20 PROCEDURE — 87493 C DIFF AMPLIFIED PROBE: CPT

## 2020-06-20 PROCEDURE — 87328 CRYPTOSPORIDIUM AG IA: CPT

## 2020-06-20 PROCEDURE — 87329 GIARDIA AG IA: CPT

## 2020-06-22 ENCOUNTER — HOSPITAL ENCOUNTER (OUTPATIENT)
Facility: MEDICAL CENTER | Age: 65
End: 2020-06-22
Attending: PHYSICIAN ASSISTANT
Payer: MEDICARE

## 2020-06-22 LAB
C DIFF DNA SPEC QL NAA+PROBE: NEGATIVE
C DIFF TOX A+B STL QL IA: NEGATIVE
C DIFF TOX GENS STL QL NAA+PROBE: NORMAL
G LAMBLIA+C PARVUM AG STL QL RAPID: NORMAL
SIGNIFICANT IND 70042: NORMAL
SITE SITE: NORMAL
SOURCE SOURCE: NORMAL

## 2020-06-22 PROCEDURE — 89055 LEUKOCYTE ASSESSMENT FECAL: CPT

## 2020-06-23 LAB
E COLI SXT1+2 STL IA: NORMAL
SIGNIFICANT IND 70042: NORMAL
SITE SITE: NORMAL
SOURCE SOURCE: NORMAL
WBC STL QL MICRO: NORMAL

## 2020-06-25 LAB
BACTERIA STL CULT: NORMAL
CALPROTECTIN STL-MCNT: 27 UG/G
E COLI SXT1+2 STL IA: NORMAL
SIGNIFICANT IND 70042: NORMAL
SITE SITE: NORMAL
SOURCE SOURCE: NORMAL

## 2020-07-06 DIAGNOSIS — I10 ESSENTIAL HYPERTENSION: ICD-10-CM

## 2020-07-06 RX ORDER — DILTIAZEM HYDROCHLORIDE 240 MG/1
240 CAPSULE, COATED, EXTENDED RELEASE ORAL DAILY
Qty: 90 CAP | Refills: 3 | Status: SHIPPED
Start: 2020-07-06 | End: 2020-10-16

## 2020-07-06 NOTE — TELEPHONE ENCOUNTER
Received request via: Pharmacy    Was the patient seen in the last year in this department? Yes 5/28/20    Does the patient have an active prescription (recently filled or refills available) for medication(s) requested? No

## 2020-07-07 NOTE — TELEPHONE ENCOUNTER
Requested Prescriptions     Pending Prescriptions Disp Refills   • DILTIAZem CD (CARTIA XT) 240 MG CAPSULE SR 24 HR [Pharmacy Med Name: CARTIA  MG CAPSULE] 90 Cap 3     Sig: Take 1 Cap by mouth every day.       JEANNE Kaminski.

## 2020-07-21 ENCOUNTER — OFFICE VISIT (OUTPATIENT)
Dept: MEDICAL GROUP | Facility: PHYSICIAN GROUP | Age: 65
End: 2020-07-21
Payer: MEDICARE

## 2020-07-21 VITALS
HEART RATE: 75 BPM | RESPIRATION RATE: 18 BRPM | DIASTOLIC BLOOD PRESSURE: 60 MMHG | TEMPERATURE: 97.7 F | WEIGHT: 218 LBS | OXYGEN SATURATION: 94 % | SYSTOLIC BLOOD PRESSURE: 144 MMHG | HEIGHT: 67 IN | BODY MASS INDEX: 34.21 KG/M2

## 2020-07-21 DIAGNOSIS — N18.30 TYPE 2 DIABETES MELLITUS WITH STAGE 3 CHRONIC KIDNEY DISEASE, WITHOUT LONG-TERM CURRENT USE OF INSULIN (HCC): ICD-10-CM

## 2020-07-21 DIAGNOSIS — Z00.00 ROUTINE HEALTH MAINTENANCE: ICD-10-CM

## 2020-07-21 DIAGNOSIS — E78.5 DYSLIPIDEMIA: ICD-10-CM

## 2020-07-21 DIAGNOSIS — N18.30 CKD (CHRONIC KIDNEY DISEASE) STAGE 3, GFR 30-59 ML/MIN: ICD-10-CM

## 2020-07-21 DIAGNOSIS — L82.1 SEBORRHEIC KERATOSIS: ICD-10-CM

## 2020-07-21 DIAGNOSIS — E11.22 TYPE 2 DIABETES MELLITUS WITH STAGE 3 CHRONIC KIDNEY DISEASE, WITHOUT LONG-TERM CURRENT USE OF INSULIN (HCC): ICD-10-CM

## 2020-07-21 DIAGNOSIS — Z23 NEED FOR VACCINATION: ICD-10-CM

## 2020-07-21 DIAGNOSIS — E66.9 CLASS 1 OBESITY WITH SERIOUS COMORBIDITY AND BODY MASS INDEX (BMI) OF 34.0 TO 34.9 IN ADULT, UNSPECIFIED OBESITY TYPE: ICD-10-CM

## 2020-07-21 DIAGNOSIS — I10 ESSENTIAL HYPERTENSION: ICD-10-CM

## 2020-07-21 PROBLEM — E66.811 CLASS 1 OBESITY WITH SERIOUS COMORBIDITY AND BODY MASS INDEX (BMI) OF 34.0 TO 34.9 IN ADULT: Status: ACTIVE | Noted: 2018-10-31

## 2020-07-21 PROCEDURE — 99214 OFFICE O/P EST MOD 30 MIN: CPT | Performed by: NURSE PRACTITIONER

## 2020-07-21 RX ORDER — MONTELUKAST SODIUM 4 MG/1
TABLET, CHEWABLE ORAL
COMMUNITY
Start: 2020-07-12 | End: 2020-09-02

## 2020-07-21 RX ORDER — CLOSTRIDIUM TETANI TOXOID ANTIGEN (FORMALDEHYDE INACTIVATED), CORYNEBACTERIUM DIPHTHERIAE TOXOID ANTIGEN (FORMALDEHYDE INACTIVATED), BORDETELLA PERTUSSIS TOXOID ANTIGEN (GLUTARALDEHYDE INACTIVATED), BORDETELLA PERTUSSIS FILAMENTOUS HEMAGGLUTININ ANTIGEN (FORMALDEHYDE INACTIVATED), BORDETELLA PERTUSSIS PERTACTIN ANTIGEN, AND BORDETELLA PERTUSSIS FIMBRIAE 2/3 ANTIGEN 5; 2; 2.5; 5; 3; 5 [LF]/.5ML; [LF]/.5ML; UG/.5ML; UG/.5ML; UG/.5ML; UG/.5ML
0.5 INJECTION, SUSPENSION INTRAMUSCULAR ONCE
Qty: 0.5 ML | Refills: 0 | Status: SHIPPED | OUTPATIENT
Start: 2020-07-21 | End: 2020-07-21

## 2020-07-21 ASSESSMENT — FIBROSIS 4 INDEX: FIB4 SCORE: 0.94

## 2020-07-21 NOTE — ASSESSMENT & PLAN NOTE
New problem to examiner.  Patient reports that she has a seborrheic keratosis to her left upper back that she thinks may be growing.  Interested in referral to dermatology for evaluation and general checkup.
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
## 2020-07-21 NOTE — ASSESSMENT & PLAN NOTE
Chronic, uncontrolled.  She indicates that she is feeling well and denies any symptoms referable to this diagnoses. Specifically denies chest pain, palpitations, dyspnea, orthopnea, PND or peripheral edema, polyuria, polydipsia, urinary complaints, abdominal complaints, myalgias, numbness, weakness or other related symptoms.   Medications: Metformin 1000 mg twice daily and glyburide 5 mg twice daily.  Glucose at home: 120-150 morning fasting blood sugar.  Hypoglycemia events: None.  Statin: Atorvastatin 40 mg.  ACEI/ARB: Losartan 100 mg.  ASA: None.  Exercise: None, recently joined a gym and plans to start exercising on Saturday.  Diabetic complications: Kidney complication    Due for updated labs, previously ordered.    A1c:   Lab Results   Component Value Date/Time    HBA1C 9.2 (H) 09/11/2018 06:39 AM    HBA1C 7.7 (H) 04/12/2016 09:38 AM    HBA1C 7.8 (H) 06/22/2015 08:04 AM

## 2020-07-21 NOTE — ASSESSMENT & PLAN NOTE
"Chronic, uncontrolled.  Currently taking atorvastatin 40 mg/day as directed.   Denies side effects of the medication--no myalgias or abdominal pain.  Reports diet is \" poor, starting to improve\".   She is not following a low-cholesterol diet.  She is not exercising regularly.  She is not taking ASA daily.  She denies dizziness, claudication, or chest pain.  Due for updated lab work, previously ordered.   3/10/2015 06:37 6/22/2015 08:04 4/12/2016 09:38 9/11/2018 06:39   Cholesterol,Tot 195 177 189 166   Triglycerides 194 (H) 190 (H) 287 (H) 170 (H)   HDL 45 45 46 45    (H) 94 86 87     "

## 2020-07-21 NOTE — ASSESSMENT & PLAN NOTE
"Chronic, ongoing.    Currently taking diltiazem  mg/day and losartan 100 mg/day as directed.   Reports diet is \" poor, starting to improve\".   She is not following a low-salt diet.  She is not exercising regularly.  She is not taking baby aspirin daily.   She is not monitoring BP at home.   Denies symptoms low BP: light-headed, tunnel-vision, unusual fatigue, dizziness.  Denies symptoms high BP: pounding headache, visual changes, palpitations, flushed face.   Denies chest pain, shortness of breath, dyspnea on exertion.   Denies medicine side effects: unusual fatigue, slow heartbeat, foot/leg swelling, cough.  Vitals 3/25/2019 2/24/2020 5/28/2020 7/21/2020   SYSTOLIC 144 160 134 144   DIASTOLIC 84 90 88 60   PULSE 75 73 71 75     "

## 2020-07-22 NOTE — ASSESSMENT & PLAN NOTE
New problem to examiner.  Chronic problem for the patient that is worsening.  Patient's most recent labs completed in June 2020 showed GFR at 32.    4/12/2016 09:38 7/18/2017 15:40 9/11/2018 06:39 6/17/2020 16:04   Bun 22 25 (H) 31 (H) 44 (H)   Creatinine 0.83 1.07 1.13 1.62 (H)   GFR >60 52 (A) 48 (A) 32 (A)
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
## 2020-09-02 ENCOUNTER — APPOINTMENT (OUTPATIENT)
Dept: RADIOLOGY | Facility: MEDICAL CENTER | Age: 65
End: 2020-09-02
Attending: EMERGENCY MEDICINE
Payer: MEDICARE

## 2020-09-02 ENCOUNTER — HOSPITAL ENCOUNTER (OUTPATIENT)
Facility: MEDICAL CENTER | Age: 65
End: 2020-09-04
Attending: EMERGENCY MEDICINE | Admitting: HOSPITALIST
Payer: MEDICARE

## 2020-09-02 DIAGNOSIS — I16.0 HYPERTENSIVE URGENCY: ICD-10-CM

## 2020-09-02 DIAGNOSIS — R73.9 HYPERGLYCEMIA: ICD-10-CM

## 2020-09-02 PROBLEM — R79.89 ELEVATED TROPONIN: Status: ACTIVE | Noted: 2020-09-02

## 2020-09-02 LAB
ANION GAP SERPL CALC-SCNC: 18 MMOL/L (ref 7–16)
BASOPHILS # BLD AUTO: 0.3 % (ref 0–1.8)
BASOPHILS # BLD: 0.02 K/UL (ref 0–0.12)
BUN SERPL-MCNC: 23 MG/DL (ref 8–22)
CALCIUM SERPL-MCNC: 9.5 MG/DL (ref 8.5–10.5)
CHLORIDE SERPL-SCNC: 103 MMOL/L (ref 96–112)
CO2 SERPL-SCNC: 18 MMOL/L (ref 20–33)
CREAT SERPL-MCNC: 0.86 MG/DL (ref 0.5–1.4)
EKG IMPRESSION: NORMAL
EOSINOPHIL # BLD AUTO: 0.15 K/UL (ref 0–0.51)
EOSINOPHIL NFR BLD: 2.2 % (ref 0–6.9)
ERYTHROCYTE [DISTWIDTH] IN BLOOD BY AUTOMATED COUNT: 50.4 FL (ref 35.9–50)
GLUCOSE BLD-MCNC: 284 MG/DL (ref 65–99)
GLUCOSE BLD-MCNC: 302 MG/DL (ref 65–99)
GLUCOSE BLD-MCNC: 347 MG/DL (ref 65–99)
GLUCOSE SERPL-MCNC: 246 MG/DL (ref 65–99)
HCT VFR BLD AUTO: 41.1 % (ref 37–47)
HGB BLD-MCNC: 13 G/DL (ref 12–16)
IMM GRANULOCYTES # BLD AUTO: 0.03 K/UL (ref 0–0.11)
IMM GRANULOCYTES NFR BLD AUTO: 0.4 % (ref 0–0.9)
LYMPHOCYTES # BLD AUTO: 2.26 K/UL (ref 1–4.8)
LYMPHOCYTES NFR BLD: 33.4 % (ref 22–41)
MCH RBC QN AUTO: 29 PG (ref 27–33)
MCHC RBC AUTO-ENTMCNC: 31.6 G/DL (ref 33.6–35)
MCV RBC AUTO: 91.7 FL (ref 81.4–97.8)
MONOCYTES # BLD AUTO: 0.49 K/UL (ref 0–0.85)
MONOCYTES NFR BLD AUTO: 7.2 % (ref 0–13.4)
NEUTROPHILS # BLD AUTO: 3.81 K/UL (ref 2–7.15)
NEUTROPHILS NFR BLD: 56.5 % (ref 44–72)
NRBC # BLD AUTO: 0 K/UL
NRBC BLD-RTO: 0 /100 WBC
PLATELET # BLD AUTO: 277 K/UL (ref 164–446)
PMV BLD AUTO: 9.8 FL (ref 9–12.9)
POTASSIUM SERPL-SCNC: 4.3 MMOL/L (ref 3.6–5.5)
RBC # BLD AUTO: 4.48 M/UL (ref 4.2–5.4)
SODIUM SERPL-SCNC: 139 MMOL/L (ref 135–145)
TROPONIN T SERPL-MCNC: 33 NG/L (ref 6–19)
TROPONIN T SERPL-MCNC: 37 NG/L (ref 6–19)
WBC # BLD AUTO: 6.8 K/UL (ref 4.8–10.8)

## 2020-09-02 PROCEDURE — 84484 ASSAY OF TROPONIN QUANT: CPT

## 2020-09-02 PROCEDURE — 71045 X-RAY EXAM CHEST 1 VIEW: CPT

## 2020-09-02 PROCEDURE — C9803 HOPD COVID-19 SPEC COLLECT: HCPCS | Performed by: HOSPITALIST

## 2020-09-02 PROCEDURE — A9270 NON-COVERED ITEM OR SERVICE: HCPCS | Performed by: EMERGENCY MEDICINE

## 2020-09-02 PROCEDURE — G0378 HOSPITAL OBSERVATION PER HR: HCPCS

## 2020-09-02 PROCEDURE — 700101 HCHG RX REV CODE 250: Performed by: EMERGENCY MEDICINE

## 2020-09-02 PROCEDURE — 99220 PR INITIAL OBSERVATION CARE,LEVL III: CPT | Performed by: HOSPITALIST

## 2020-09-02 PROCEDURE — 700101 HCHG RX REV CODE 250: Performed by: HOSPITALIST

## 2020-09-02 PROCEDURE — 36415 COLL VENOUS BLD VENIPUNCTURE: CPT

## 2020-09-02 PROCEDURE — 93005 ELECTROCARDIOGRAM TRACING: CPT | Performed by: EMERGENCY MEDICINE

## 2020-09-02 PROCEDURE — 82962 GLUCOSE BLOOD TEST: CPT | Mod: 91

## 2020-09-02 PROCEDURE — 700102 HCHG RX REV CODE 250 W/ 637 OVERRIDE(OP): Performed by: HOSPITALIST

## 2020-09-02 PROCEDURE — 96375 TX/PRO/DX INJ NEW DRUG ADDON: CPT

## 2020-09-02 PROCEDURE — 99285 EMERGENCY DEPT VISIT HI MDM: CPT

## 2020-09-02 PROCEDURE — 96374 THER/PROPH/DIAG INJ IV PUSH: CPT

## 2020-09-02 PROCEDURE — A9270 NON-COVERED ITEM OR SERVICE: HCPCS | Performed by: HOSPITALIST

## 2020-09-02 PROCEDURE — 96372 THER/PROPH/DIAG INJ SC/IM: CPT

## 2020-09-02 PROCEDURE — 700102 HCHG RX REV CODE 250 W/ 637 OVERRIDE(OP): Performed by: EMERGENCY MEDICINE

## 2020-09-02 PROCEDURE — 700111 HCHG RX REV CODE 636 W/ 250 OVERRIDE (IP): Performed by: HOSPITALIST

## 2020-09-02 PROCEDURE — 80048 BASIC METABOLIC PNL TOTAL CA: CPT

## 2020-09-02 PROCEDURE — 85025 COMPLETE CBC W/AUTO DIFF WBC: CPT

## 2020-09-02 PROCEDURE — 83036 HEMOGLOBIN GLYCOSYLATED A1C: CPT

## 2020-09-02 RX ORDER — TIZANIDINE 4 MG/1
4 TABLET ORAL EVERY 6 HOURS PRN
Status: DISCONTINUED | OUTPATIENT
Start: 2020-09-02 | End: 2020-09-02

## 2020-09-02 RX ORDER — DILTIAZEM HYDROCHLORIDE 240 MG/1
240 CAPSULE, COATED, EXTENDED RELEASE ORAL DAILY
Status: DISCONTINUED | OUTPATIENT
Start: 2020-09-03 | End: 2020-09-04 | Stop reason: HOSPADM

## 2020-09-02 RX ORDER — ALBUTEROL SULFATE 90 UG/1
2 AEROSOL, METERED RESPIRATORY (INHALATION) EVERY 6 HOURS PRN
Status: DISCONTINUED | OUTPATIENT
Start: 2020-09-02 | End: 2020-09-02

## 2020-09-02 RX ORDER — LOSARTAN POTASSIUM 50 MG/1
100 TABLET ORAL DAILY
Status: DISCONTINUED | OUTPATIENT
Start: 2020-09-03 | End: 2020-09-04 | Stop reason: HOSPADM

## 2020-09-02 RX ORDER — LABETALOL HYDROCHLORIDE 5 MG/ML
10 INJECTION, SOLUTION INTRAVENOUS EVERY 4 HOURS PRN
Status: DISCONTINUED | OUTPATIENT
Start: 2020-09-02 | End: 2020-09-04 | Stop reason: HOSPADM

## 2020-09-02 RX ORDER — ONDANSETRON 4 MG/1
4 TABLET, ORALLY DISINTEGRATING ORAL EVERY 4 HOURS PRN
Status: DISCONTINUED | OUTPATIENT
Start: 2020-09-02 | End: 2020-09-04 | Stop reason: HOSPADM

## 2020-09-02 RX ORDER — ATORVASTATIN CALCIUM 40 MG/1
40 TABLET, FILM COATED ORAL EVERY EVENING
Status: DISCONTINUED | OUTPATIENT
Start: 2020-09-02 | End: 2020-09-04 | Stop reason: HOSPADM

## 2020-09-02 RX ORDER — BISACODYL 10 MG
10 SUPPOSITORY, RECTAL RECTAL
Status: DISCONTINUED | OUTPATIENT
Start: 2020-09-02 | End: 2020-09-04 | Stop reason: HOSPADM

## 2020-09-02 RX ORDER — AMOXICILLIN 250 MG
2 CAPSULE ORAL 2 TIMES DAILY
Status: DISCONTINUED | OUTPATIENT
Start: 2020-09-03 | End: 2020-09-04 | Stop reason: HOSPADM

## 2020-09-02 RX ORDER — ACETAMINOPHEN 325 MG/1
650 TABLET ORAL ONCE
Status: COMPLETED | OUTPATIENT
Start: 2020-09-02 | End: 2020-09-02

## 2020-09-02 RX ORDER — POLYETHYLENE GLYCOL 3350 17 G/17G
1 POWDER, FOR SOLUTION ORAL
Status: DISCONTINUED | OUTPATIENT
Start: 2020-09-02 | End: 2020-09-04 | Stop reason: HOSPADM

## 2020-09-02 RX ORDER — LABETALOL HYDROCHLORIDE 5 MG/ML
10 INJECTION, SOLUTION INTRAVENOUS ONCE
Status: COMPLETED | OUTPATIENT
Start: 2020-09-02 | End: 2020-09-02

## 2020-09-02 RX ORDER — DEXTROSE MONOHYDRATE 25 G/50ML
50 INJECTION, SOLUTION INTRAVENOUS
Status: DISCONTINUED | OUTPATIENT
Start: 2020-09-02 | End: 2020-09-04 | Stop reason: HOSPADM

## 2020-09-02 RX ORDER — NITROGLYCERIN 0.4 MG/1
0.4 TABLET SUBLINGUAL ONCE
Status: COMPLETED | OUTPATIENT
Start: 2020-09-02 | End: 2020-09-02

## 2020-09-02 RX ORDER — ONDANSETRON 2 MG/ML
4 INJECTION INTRAMUSCULAR; INTRAVENOUS EVERY 4 HOURS PRN
Status: DISCONTINUED | OUTPATIENT
Start: 2020-09-02 | End: 2020-09-04 | Stop reason: HOSPADM

## 2020-09-02 RX ORDER — SPIRONOLACTONE AND HYDROCHLOROTHIAZIDE 25; 25 MG/1; MG/1
1 TABLET ORAL
Status: DISCONTINUED | OUTPATIENT
Start: 2020-09-02 | End: 2020-09-04 | Stop reason: HOSPADM

## 2020-09-02 RX ADMIN — ATORVASTATIN CALCIUM 40 MG: 40 TABLET, FILM COATED ORAL at 17:48

## 2020-09-02 RX ADMIN — INSULIN HUMAN 5 UNITS: 100 INJECTION, SOLUTION PARENTERAL at 17:51

## 2020-09-02 RX ADMIN — SPIRONOLACTONE AND HYDROCHLOROTHIAZIDE 1 TABLET: 25; 25 TABLET ORAL at 18:08

## 2020-09-02 RX ADMIN — LABETALOL HYDROCHLORIDE 10 MG: 5 INJECTION, SOLUTION INTRAVENOUS at 18:48

## 2020-09-02 RX ADMIN — ENOXAPARIN SODIUM 40 MG: 40 INJECTION SUBCUTANEOUS at 17:47

## 2020-09-02 RX ADMIN — LABETALOL HYDROCHLORIDE 10 MG: 5 INJECTION, SOLUTION INTRAVENOUS at 14:21

## 2020-09-02 RX ADMIN — INSULIN HUMAN 6 UNITS: 100 INJECTION, SOLUTION PARENTERAL at 22:34

## 2020-09-02 RX ADMIN — NITROGLYCERIN 0.4 MG: 0.4 TABLET, ORALLY DISINTEGRATING SUBLINGUAL at 14:02

## 2020-09-02 RX ADMIN — ACETAMINOPHEN 650 MG: 325 TABLET, FILM COATED ORAL at 14:02

## 2020-09-02 ASSESSMENT — LIFESTYLE VARIABLES
HAVE YOU EVER FELT YOU SHOULD CUT DOWN ON YOUR DRINKING: NO
TOTAL SCORE: 0
EVER HAD A DRINK FIRST THING IN THE MORNING TO STEADY YOUR NERVES TO GET RID OF A HANGOVER: NO
TOTAL SCORE: 0
HAVE PEOPLE ANNOYED YOU BY CRITICIZING YOUR DRINKING: NO
HOW MANY TIMES IN THE PAST YEAR HAVE YOU HAD 5 OR MORE DRINKS IN A DAY: 0
ON A TYPICAL DAY WHEN YOU DRINK ALCOHOL HOW MANY DRINKS DO YOU HAVE: 0
AVERAGE NUMBER OF DAYS PER WEEK YOU HAVE A DRINK CONTAINING ALCOHOL: 0
TOTAL SCORE: 0
EVER FELT BAD OR GUILTY ABOUT YOUR DRINKING: NO
ALCOHOL_USE: NO
CONSUMPTION TOTAL: NEGATIVE

## 2020-09-02 ASSESSMENT — PATIENT HEALTH QUESTIONNAIRE - PHQ9
SUM OF ALL RESPONSES TO PHQ9 QUESTIONS 1 AND 2: 0
1. LITTLE INTEREST OR PLEASURE IN DOING THINGS: NOT AT ALL
2. FEELING DOWN, DEPRESSED, IRRITABLE, OR HOPELESS: NOT AT ALL

## 2020-09-02 ASSESSMENT — FIBROSIS 4 INDEX
FIB4 SCORE: 0.94
FIB4 SCORE: 1.1

## 2020-09-02 NOTE — ED TRIAGE NOTES
Chief Complaint   Patient presents with   • Hypertension     taking htn meds, sent from GI MD office for htn prior to colonoscopy

## 2020-09-02 NOTE — PROGRESS NOTES
Report received, pt care assumed. Pt to floor via wheelchair with hospital escort. Pt ambulate to bed with steady gait. Tele monitor on. Call light and personal belongings within reach of pt. Pt denies any additional needs. Will continue to monitor.

## 2020-09-02 NOTE — ED PROVIDER NOTES
ED Provider Note    Scribed for Jeremiah Watts M.D. by Marialuisa Plaza. 9/2/2020  1:18 PM    Primary care provider: FILI Kaminski  Means of arrival: Walk in  History obtained from: patient   History limited by: none    CHIEF COMPLAINT  Chief Complaint   Patient presents with   • Hypertension     taking htn meds, sent from GI MD office for htn prior to colonoscopy       HPI  Brenda Davidson is a 65 y.o. female who presents to the Emergency Department for hypertension. Patient went to get a colonoscopy and was referred to the ED after her blood pressure was found to be 254/150. She takes medication for hypertension and took them this morning as prescribed. She has associated headache, but denies chest pain, back pain, or abdominal pain. Patient was getting a colonoscopy due to ongoing diarrhea and has felt weak from this.     REVIEW OF SYSTEMS  Pertinent positives include hypertension and headache.   Pertinent negatives include no chest pain, back pain, or abdominal pain.    All other systems reviewed and negative. See HPI for further details.     PAST MEDICAL HISTORY   has a past medical history of Acute left-sided low back pain without sciatica (2/24/2020), Anesthesia, Chronic low back pain, Dental disorder, DM (diabetes mellitus) (HCC), Hypercholesteremia, Hypertension, Mass of cecum (9/15), Obesity (BMI 30.0-34.9), and Other specified disorder of intestines.    SURGICAL HISTORY   has a past surgical history that includes other; other; pelviscopy (4/6/2012); lysis adhesions gyn (4/6/2012); colonoscopy with polyp (2012); arthroscopy, knee (3/14); colonoscopy with polyp (8/13/15); cholecystectomy (8/21/2015); appendectomy (2015); and bowel resection laparoscopic (8/21/2015).    SOCIAL HISTORY  Social History     Tobacco Use   • Smoking status: Never Smoker   • Smokeless tobacco: Never Used   Substance Use Topics   • Alcohol use: No     Alcohol/week: 0.0 oz   • Drug use: No      Social History  "    Substance and Sexual Activity   Drug Use No       FAMILY HISTORY  Family History   Problem Relation Age of Onset   • Cancer Mother         breast cancer   • Lung Disease Mother         emphysema   • Hypertension Father    • Alzheimer's Disease Sister    • Dementia Sister    • Hypertension Brother    • No Known Problems Sister    • No Known Problems Sister    • Hypertension Brother        CURRENT MEDICATIONS  Home Medications    **Home medications have not yet been reviewed for this encounter**         ALLERGIES  Allergies   Allergen Reactions   • Codeine Rash and Itching     Vicodin doesn't cause her any problems   • Ace Inhibitors      cough   • Flagyl [Metronidazole] Nausea       PHYSICAL EXAM  VITAL SIGNS: BP (!) 219/96   Pulse 74   Temp 36.9 °C (98.4 °F) (Temporal)   Resp 16   Ht 1.702 m (5' 7\")   Wt 94.6 kg (208 lb 8.9 oz)   SpO2 95%   BMI 32.66 kg/m²     Nursing note and vitals reviewed.  Constitutional: Well-developed and well-nourished.  Somewhat anxious.  Concerned about her blood pressure.  Blood pressure significantly elevated on the monitor.   HENT: Head is normocephalic and atraumatic. Oropharynx is clear and moist without exudate or erythema.   Eyes: Pupils are equal, round, and reactive to light. Conjunctiva are normal.   Cardiovascular: Normal rate and regular rhythm. No murmur heard. Normal radial pulses.   Pulmonary/Chest: Breath sounds normal. No wheezes or rales. No chest wall tenderness.   Abdominal: Obese, Soft and non-tender. No distention   Musculoskeletal: Extremities exhibit normal range of motion without edema or tenderness. No calf tenderness or palpable cords.   Neurological: Awake, alert and oriented to person, place, and time. No focal deficits noted.  Skin: Skin is warm and dry. No rash.   Psychiatric: Normal mood and affect. Appropriate for clinical situation    DIAGNOSTIC STUDIES / PROCEDURES    EKG Interpretation  Interpreted by me as below    LABS  Results for orders " placed or performed during the hospital encounter of 09/02/20   CBC WITH DIFFERENTIAL   Result Value Ref Range    WBC 6.8 4.8 - 10.8 K/uL    RBC 4.48 4.20 - 5.40 M/uL    Hemoglobin 13.0 12.0 - 16.0 g/dL    Hematocrit 41.1 37.0 - 47.0 %    MCV 91.7 81.4 - 97.8 fL    MCH 29.0 27.0 - 33.0 pg    MCHC 31.6 (L) 33.6 - 35.0 g/dL    RDW 50.4 (H) 35.9 - 50.0 fL    Platelet Count 277 164 - 446 K/uL    MPV 9.8 9.0 - 12.9 fL    Neutrophils-Polys 56.50 44.00 - 72.00 %    Lymphocytes 33.40 22.00 - 41.00 %    Monocytes 7.20 0.00 - 13.40 %    Eosinophils 2.20 0.00 - 6.90 %    Basophils 0.30 0.00 - 1.80 %    Immature Granulocytes 0.40 0.00 - 0.90 %    Nucleated RBC 0.00 /100 WBC    Neutrophils (Absolute) 3.81 2.00 - 7.15 K/uL    Lymphs (Absolute) 2.26 1.00 - 4.80 K/uL    Monos (Absolute) 0.49 0.00 - 0.85 K/uL    Eos (Absolute) 0.15 0.00 - 0.51 K/uL    Baso (Absolute) 0.02 0.00 - 0.12 K/uL    Immature Granulocytes (abs) 0.03 0.00 - 0.11 K/uL    NRBC (Absolute) 0.00 K/uL   BASIC METABOLIC PANEL   Result Value Ref Range    Sodium 139 135 - 145 mmol/L    Potassium 4.3 3.6 - 5.5 mmol/L    Chloride 103 96 - 112 mmol/L    Co2 18 (L) 20 - 33 mmol/L    Glucose 246 (H) 65 - 99 mg/dL    Bun 23 (H) 8 - 22 mg/dL    Creatinine 0.86 0.50 - 1.40 mg/dL    Calcium 9.5 8.5 - 10.5 mg/dL    Anion Gap 18.0 (H) 7.0 - 16.0   TROPONIN   Result Value Ref Range    Troponin T 37 (H) 6 - 19 ng/L   ESTIMATED GFR   Result Value Ref Range    GFR If African American >60 >60 mL/min/1.73 m 2    GFR If Non African American >60 >60 mL/min/1.73 m 2      All labs reviewed by me.    RADIOLOGY  No orders to display     The radiologist's interpretation of all radiological studies have been reviewed by me.    COURSE & MEDICAL DECISION MAKING  Nursing notes, ASIA SANDOVALx reviewed in chart.     1:18 PM - Patient seen and examined at bedside. I discussed that we will need to treat the patient for her hypertension and obtain blood tests, however due to her elevated blood pressure  I do think she will need to stay in the ED overnight. Patient will be treated with Nitroglycerin 0.4 mg, Labetalol 10 mg, and Tylenol 650 mg. Ordered chest x-ray, CBC w/ diff, BMP, troponin, and EKG to evaluate her symptoms. The differential diagnoses include but are not limited to: hypertensive urgency vs emergency      Patient presents today with elevated blood pressure.  Troponin is mildly elevated.  She has been treated with nitroglycerin as well as labetalol in the emergency department.    On repeat evaluation, he is doing well.  Blood pressure starting to come down but she will require admission to the hospital for further evaluation and treatment.  I spoke with the on-call hospitalist for admission.  Troponin is mildly elevated.  EKG does not demonstrate evidence of acute ischemia.  Likely related to strain.    DISPOSITION:  Patient will be hospitalized in stable condition.    CRITICAL CARE  I provided critical care services, which included medication orders, frequent reevaluations of the patient's condition and response to treatment, ordering and reviewing test results, and discussing the case with various consultants.  The critical care time associated with the care of the patient was 35 minutes. Review chart for interventions. This time is exclusive of any other billable procedures.        FINAL IMPRESSION  1. Hypertensive urgency    2. Hyperglycemia    3.  Elevated troponin     I, Marialuisa Plaza (Mirnaibsamara), am scribing for, and in the presence of, Jeremiah Watts M.D..    Electronically signed by: Marialuisa Plaza (Maryann), 9/2/2020    IJeremiah M.D. personally performed the services described in this documentation, as scribed by Marialuisa Plaza in my presence, and it is both accurate and complete. C    The note accurately reflects work and decisions made by me.  Jeremiah Watts M.D.  9/2/2020  2:52 PM

## 2020-09-02 NOTE — PROGRESS NOTES
This is a 85-year-old female with a past medical history significant for type 2 diabetes mellitus, hypertension, dyslipidemia globin A1c of 9.2 in 2018 presented from GI doctors office as he was noted to be hypertensive with a systolic blood pressure 240.  Patient reports she does take losartan 100 mg p.o. daily and diltiazem 240 mg p.o. daily, reports she did take her blood pressure medication this morning as per ER physician.  Upon presentation in ER her systolic blood pressure is noted to be 220 x 96, received 10 of IV labetalol, ER planning to give clonidine.  She is also noted to have mildly elevated troponin of 34 but denied chest pain as per ER physician    She will be admitted for hypertensive emergency/urgency    Dr Annalisa Bradford will be admitting this patent and obtaining H and P

## 2020-09-03 ENCOUNTER — APPOINTMENT (OUTPATIENT)
Dept: CARDIOLOGY | Facility: MEDICAL CENTER | Age: 65
End: 2020-09-03
Attending: HOSPITALIST
Payer: MEDICARE

## 2020-09-03 ENCOUNTER — APPOINTMENT (OUTPATIENT)
Dept: RADIOLOGY | Facility: MEDICAL CENTER | Age: 65
End: 2020-09-03
Attending: HOSPITALIST
Payer: MEDICARE

## 2020-09-03 DIAGNOSIS — E11.9 TYPE 2 DIABETES MELLITUS WITHOUT COMPLICATION, WITHOUT LONG-TERM CURRENT USE OF INSULIN (HCC): ICD-10-CM

## 2020-09-03 LAB
ANION GAP SERPL CALC-SCNC: 17 MMOL/L (ref 7–16)
APPEARANCE UR: ABNORMAL
BACTERIA #/AREA URNS HPF: ABNORMAL /HPF
BILIRUB UR QL STRIP.AUTO: NEGATIVE
BUN SERPL-MCNC: 27 MG/DL (ref 8–22)
CALCIUM SERPL-MCNC: 9.1 MG/DL (ref 8.5–10.5)
CHLORIDE SERPL-SCNC: 104 MMOL/L (ref 96–112)
CO2 SERPL-SCNC: 17 MMOL/L (ref 20–33)
COLOR UR: YELLOW
CREAT SERPL-MCNC: 1.18 MG/DL (ref 0.5–1.4)
EPI CELLS #/AREA URNS HPF: ABNORMAL /HPF
EST. AVERAGE GLUCOSE BLD GHB EST-MCNC: 229 MG/DL
GLUCOSE BLD-MCNC: 182 MG/DL (ref 65–99)
GLUCOSE BLD-MCNC: 198 MG/DL (ref 65–99)
GLUCOSE BLD-MCNC: 209 MG/DL (ref 65–99)
GLUCOSE BLD-MCNC: 252 MG/DL (ref 65–99)
GLUCOSE SERPL-MCNC: 229 MG/DL (ref 65–99)
GLUCOSE UR STRIP.AUTO-MCNC: NEGATIVE MG/DL
HBA1C MFR BLD: 9.6 % (ref 0–5.6)
HYALINE CASTS #/AREA URNS LPF: ABNORMAL /LPF
KETONES UR STRIP.AUTO-MCNC: NEGATIVE MG/DL
LEUKOCYTE ESTERASE UR QL STRIP.AUTO: ABNORMAL
LV EJECT FRACT  99904: 60
LV EJECT FRACT MOD 2C 99903: 68.08
LV EJECT FRACT MOD 4C 99902: 70.77
LV EJECT FRACT MOD BP 99901: 69.61
MICRO URNS: ABNORMAL
NITRITE UR QL STRIP.AUTO: NEGATIVE
PH UR STRIP.AUTO: 6 [PH] (ref 5–8)
POTASSIUM SERPL-SCNC: 4.3 MMOL/L (ref 3.6–5.5)
PROT UR QL STRIP: 100 MG/DL
RBC # URNS HPF: ABNORMAL /HPF
RBC UR QL AUTO: ABNORMAL
SODIUM SERPL-SCNC: 138 MMOL/L (ref 135–145)
SP GR UR STRIP.AUTO: 1.01
TROPONIN T SERPL-MCNC: 34 NG/L (ref 6–19)
UROBILINOGEN UR STRIP.AUTO-MCNC: 0.2 MG/DL
WBC #/AREA URNS HPF: ABNORMAL /HPF

## 2020-09-03 PROCEDURE — 700111 HCHG RX REV CODE 636 W/ 250 OVERRIDE (IP): Performed by: HOSPITALIST

## 2020-09-03 PROCEDURE — A9270 NON-COVERED ITEM OR SERVICE: HCPCS | Performed by: HOSPITALIST

## 2020-09-03 PROCEDURE — 36415 COLL VENOUS BLD VENIPUNCTURE: CPT

## 2020-09-03 PROCEDURE — 96376 TX/PRO/DX INJ SAME DRUG ADON: CPT | Mod: XU

## 2020-09-03 PROCEDURE — 700101 HCHG RX REV CODE 250: Performed by: HOSPITALIST

## 2020-09-03 PROCEDURE — 82962 GLUCOSE BLOOD TEST: CPT | Mod: 91

## 2020-09-03 PROCEDURE — 93975 VASCULAR STUDY: CPT

## 2020-09-03 PROCEDURE — 96372 THER/PROPH/DIAG INJ SC/IM: CPT

## 2020-09-03 PROCEDURE — 700102 HCHG RX REV CODE 250 W/ 637 OVERRIDE(OP): Performed by: HOSPITALIST

## 2020-09-03 PROCEDURE — 93975 VASCULAR STUDY: CPT | Mod: 26 | Performed by: INTERNAL MEDICINE

## 2020-09-03 PROCEDURE — 80048 BASIC METABOLIC PNL TOTAL CA: CPT

## 2020-09-03 PROCEDURE — 84484 ASSAY OF TROPONIN QUANT: CPT

## 2020-09-03 PROCEDURE — 81001 URINALYSIS AUTO W/SCOPE: CPT

## 2020-09-03 PROCEDURE — 99226 PR SUBSEQUENT OBSERVATION CARE,LEVEL III: CPT | Performed by: HOSPITALIST

## 2020-09-03 PROCEDURE — 93306 TTE W/DOPPLER COMPLETE: CPT | Mod: 26 | Performed by: INTERNAL MEDICINE

## 2020-09-03 PROCEDURE — G0378 HOSPITAL OBSERVATION PER HR: HCPCS

## 2020-09-03 PROCEDURE — 93306 TTE W/DOPPLER COMPLETE: CPT

## 2020-09-03 RX ORDER — HYDRALAZINE HYDROCHLORIDE 25 MG/1
25 TABLET, FILM COATED ORAL EVERY 8 HOURS
Status: DISCONTINUED | OUTPATIENT
Start: 2020-09-03 | End: 2020-09-04

## 2020-09-03 RX ORDER — INSULIN GLARGINE 100 [IU]/ML
7 INJECTION, SOLUTION SUBCUTANEOUS EVERY EVENING
Status: DISCONTINUED | OUTPATIENT
Start: 2020-09-03 | End: 2020-09-04 | Stop reason: HOSPADM

## 2020-09-03 RX ORDER — ACETAMINOPHEN 325 MG/1
650 TABLET ORAL EVERY 4 HOURS PRN
Status: DISCONTINUED | OUTPATIENT
Start: 2020-09-03 | End: 2020-09-04 | Stop reason: HOSPADM

## 2020-09-03 RX ORDER — GLYBURIDE 5 MG/1
TABLET ORAL
Qty: 200 TAB | Refills: 0 | OUTPATIENT
Start: 2020-09-03

## 2020-09-03 RX ADMIN — SPIRONOLACTONE AND HYDROCHLOROTHIAZIDE 1 TABLET: 25; 25 TABLET ORAL at 05:17

## 2020-09-03 RX ADMIN — INSULIN HUMAN 5 UNITS: 100 INJECTION, SOLUTION PARENTERAL at 22:04

## 2020-09-03 RX ADMIN — DILTIAZEM HYDROCHLORIDE 240 MG: 240 CAPSULE, COATED, EXTENDED RELEASE ORAL at 05:15

## 2020-09-03 RX ADMIN — ENOXAPARIN SODIUM 40 MG: 40 INJECTION SUBCUTANEOUS at 05:18

## 2020-09-03 RX ADMIN — INSULIN HUMAN 3 UNITS: 100 INJECTION, SOLUTION PARENTERAL at 13:45

## 2020-09-03 RX ADMIN — INSULIN HUMAN 2 UNITS: 100 INJECTION, SOLUTION PARENTERAL at 09:48

## 2020-09-03 RX ADMIN — ACETAMINOPHEN 650 MG: 325 TABLET, FILM COATED ORAL at 09:47

## 2020-09-03 RX ADMIN — LABETALOL HYDROCHLORIDE 10 MG: 5 INJECTION, SOLUTION INTRAVENOUS at 06:21

## 2020-09-03 RX ADMIN — LOSARTAN POTASSIUM 100 MG: 50 TABLET, FILM COATED ORAL at 05:16

## 2020-09-03 RX ADMIN — ATORVASTATIN CALCIUM 40 MG: 40 TABLET, FILM COATED ORAL at 18:29

## 2020-09-03 RX ADMIN — INSULIN GLARGINE 7 UNITS: 100 INJECTION, SOLUTION SUBCUTANEOUS at 18:30

## 2020-09-03 RX ADMIN — INSULIN HUMAN 2 UNITS: 100 INJECTION, SOLUTION PARENTERAL at 18:31

## 2020-09-03 RX ADMIN — HYDRALAZINE HYDROCHLORIDE 25 MG: 25 TABLET, FILM COATED ORAL at 09:41

## 2020-09-03 RX ADMIN — HYDRALAZINE HYDROCHLORIDE 25 MG: 25 TABLET, FILM COATED ORAL at 22:04

## 2020-09-03 ASSESSMENT — PATIENT HEALTH QUESTIONNAIRE - PHQ9
SUM OF ALL RESPONSES TO PHQ9 QUESTIONS 1 AND 2: 0
2. FEELING DOWN, DEPRESSED, IRRITABLE, OR HOPELESS: NOT AT ALL
1. LITTLE INTEREST OR PLEASURE IN DOING THINGS: NOT AT ALL

## 2020-09-03 ASSESSMENT — ENCOUNTER SYMPTOMS
HEADACHES: 0
NAUSEA: 0
CHILLS: 0
DIZZINESS: 0
BLURRED VISION: 0
PND: 0
CLAUDICATION: 0
COUGH: 0
MYALGIAS: 0
PALPITATIONS: 0
HEMOPTYSIS: 0
WHEEZING: 0
HEARTBURN: 0
BRUISES/BLEEDS EASILY: 0
NECK PAIN: 0
DEPRESSION: 0
VOMITING: 0
DOUBLE VISION: 0
FEVER: 0
BACK PAIN: 0

## 2020-09-03 ASSESSMENT — PAIN DESCRIPTION - PAIN TYPE
TYPE: CHRONIC PAIN
TYPE: ACUTE PAIN

## 2020-09-03 NOTE — PROGRESS NOTES
[facility] Medicine Daily Progress Note    Date of Service  9/3/2020    Chief Complaint  65 y.o. female admitted 9/2/2020 with htn.      Interval Problem Update  Resting in bed, no n/v/d/c,no fever or chills no chest pain no palpitations.     Consultants/Specialty  none    Code Status  Full Code    Disposition  Tbd.     Review of Systems  Review of Systems   Constitutional: Negative for chills and fever.   HENT: Negative for congestion and nosebleeds.    Eyes: Negative for blurred vision and double vision.   Respiratory: Negative for cough, hemoptysis and wheezing.    Cardiovascular: Negative for chest pain, palpitations, claudication, leg swelling and PND.   Gastrointestinal: Negative for heartburn, nausea and vomiting.   Genitourinary: Negative for frequency, hematuria and urgency.   Musculoskeletal: Negative for back pain, myalgias and neck pain.   Skin: Negative for rash.   Neurological: Negative for dizziness and headaches.   Endo/Heme/Allergies: Does not bruise/bleed easily.   Psychiatric/Behavioral: Negative for depression and suicidal ideas.        Physical Exam  Temp:  [36.1 °C (97 °F)-36.7 °C (98 °F)] 36.4 °C (97.6 °F)  Pulse:  [62-72] 63  Resp:  [14-20] 20  BP: (147-222)/(65-98) 147/65  SpO2:  [93 %-100 %] 100 %    Physical Exam  Vitals signs and nursing note reviewed.   Constitutional:       Appearance: Normal appearance.   HENT:      Head: Normocephalic and atraumatic.      Nose: Nose normal.      Mouth/Throat:      Mouth: Mucous membranes are moist.      Pharynx: Oropharynx is clear.   Eyes:      General:         Right eye: No discharge.         Left eye: No discharge.      Conjunctiva/sclera: Conjunctivae normal.      Pupils: Pupils are equal, round, and reactive to light.   Neck:      Musculoskeletal: Normal range of motion and neck supple.   Cardiovascular:      Rate and Rhythm: Normal rate and regular rhythm.      Pulses: Normal pulses.      Heart sounds: Normal heart sounds.   Pulmonary:       Effort: Pulmonary effort is normal. No respiratory distress.      Breath sounds: Normal breath sounds.   Abdominal:      General: Bowel sounds are normal. There is no distension.      Palpations: Abdomen is soft.      Tenderness: There is no abdominal tenderness. There is no guarding.   Musculoskeletal: Normal range of motion.         General: No swelling.   Skin:     General: Skin is warm and dry.      Capillary Refill: Capillary refill takes less than 2 seconds.   Neurological:      General: No focal deficit present.      Mental Status: She is alert and oriented to person, place, and time.      Cranial Nerves: No cranial nerve deficit.   Psychiatric:         Mood and Affect: Mood normal.         Fluids  No intake or output data in the 24 hours ending 09/03/20 1332    Laboratory  Recent Labs     09/02/20  1345   WBC 6.8   RBC 4.48   HEMOGLOBIN 13.0   HEMATOCRIT 41.1   MCV 91.7   MCH 29.0   MCHC 31.6*   RDW 50.4*   PLATELETCT 277   MPV 9.8     Recent Labs     09/02/20  1345 09/03/20  0305   SODIUM 139 138   POTASSIUM 4.3 4.3   CHLORIDE 103 104   CO2 18* 17*   GLUCOSE 246* 229*   BUN 23* 27*   CREATININE 0.86 1.18   CALCIUM 9.5 9.1                   Imaging  US-RENAL ARTERY DUPLEX COMP   Final Result      DX-CHEST-PORTABLE (1 VIEW)   Final Result      1.  There is no acute cardiopulmonary process.      EC-ECHOCARDIOGRAM COMPLETE W/O CONT    (Results Pending)        Assessment/Plan  * Hypertensive urgency  Assessment & Plan  Continue losartan and Cardizem  Hydralazine added  Us kidney ordered        Elevated troponin  Assessment & Plan  Likely demand ischemia secondary to hypertensive urgency  No chest pain  ekg stable.     Type 2 diabetes mellitus with kidney complication, without long-term current use of insulin (HCC)- (present on admission)  Assessment & Plan  Checking a1c holding oral medication.   Will probably need insuline upon dc, DM educator consulted.     Dyslipidemia- (present on admission)  Assessment &  Plan  Continue atorvastatin         VTE prophylaxis: lovenox

## 2020-09-03 NOTE — PROGRESS NOTES
Assessment completed.  Pt A&Ox4.  Pt reports 2/10 headache, declines interventions. /68. Sinus rhythm on telemetry monitor.  POC discussed:  communication board updated.    Bed in locked, lowest position.  Call light and belongings within reach.  Needs met, will continue to monitor.

## 2020-09-03 NOTE — H&P
Hospital Medicine History & Physical Note    Date of Service  9/2/2020    Primary Care Physician  FILI Kaminski    Consultants  None    Code Status  Full Code    Chief Complaint  Chief Complaint   Patient presents with   • Hypertension     taking htn meds, sent from GI MD office for htn prior to colonoscopy       History of Presenting Illness  65 y.o. female who presented 9/2/2020 with history of hypertension and diabetes was scheduled to have a colonoscopy today completed her prep last night.  When she presented for her test she was noted to have significantly elevated blood pressure of 250/150 and was directed to the emergency department.  She reports that she has been under more stress lately.  She reports that she has been compliant with all her medications and she took her losartan and Cardizem this morning.  She does not monitor her blood pressure.  She is compliant with low-salt diet.  She denies any chest pain or dyspnea.  She had mild headache earlier.  No focal weakness or numbness.  No changes in speech or vision changes.  Patient was last seen by her PCP in July at that time her blood pressure was 144/60.  On her prior visits her blood pressure has been ranging between 134-160/60-90    Review of Systems  Review of Systems   All other systems reviewed and are negative.      Past Medical History   has a past medical history of Acute left-sided low back pain without sciatica (2/24/2020), Anesthesia, Chronic low back pain, Dental disorder, DM (diabetes mellitus) (HCC), Hypercholesteremia, Hypertension, Mass of cecum (9/15), Obesity (BMI 30.0-34.9), and Other specified disorder of intestines.    Surgical History   has a past surgical history that includes other; other; pelviscopy (4/6/2012); lysis adhesions gyn (4/6/2012); colonoscopy with polyp (2012); arthroscopy, knee (3/14); colonoscopy with polyp (8/13/15); cholecystectomy (8/21/2015); appendectomy (2015); and bowel resection laparoscopic  (8/21/2015).     Family History  family history includes Alzheimer's Disease in her sister; Cancer in her mother; Dementia in her sister; Hypertension in her brother, brother, and father; Lung Disease in her mother; No Known Problems in her sister and sister.     Social History   reports that she has never smoked. She has never used smokeless tobacco. She reports that she does not drink alcohol or use drugs.    Allergies  Allergies   Allergen Reactions   • Codeine Rash and Itching     Vicodin doesn't cause her any problems   • Ace Inhibitors      cough   • Flagyl [Metronidazole] Vomiting and Nausea       Medications  Prior to Admission Medications   Prescriptions Last Dose Informant Patient Reported? Taking?   DILTIAZem CD (CARTIA XT) 240 MG CAPSULE SR 24 HR 9/2/2020 at 0530  No No   Sig: Take 1 Cap by mouth every day.   atorvastatin (LIPITOR) 40 MG Tab 8/31/2020 at pm  No No   Sig: TAKE ONE TABLET BY MOUTH EVERY NIGHT AT BEDTIME   glyBURIDE (DIABETA) 5 MG Tab 9/1/2020 at 0600  No No   Sig: Take 1 Tab by mouth 2 times a day, with meals. Please update labs before next refill   losartan (COZAAR) 100 MG Tab 9/2/2020 at 0530  No No   Sig: Take 1 Tab by mouth every day.   metformin (GLUCOPHAGE) 1000 MG tablet 9/1/2020 at 0600  No No   Sig: Take 1 Tab by mouth 2 times a day, with meals.      Facility-Administered Medications: None       Physical Exam  Temp:  [36.1 °C (97 °F)-36.9 °C (98.4 °F)] 36.1 °C (97 °F)  Pulse:  [67-74] 67  Resp:  [14-20] 20  BP: (180-220)/(83-96) 198/85  SpO2:  [94 %-97 %] 97 %    Physical Exam  Vitals signs and nursing note reviewed.   Constitutional:       General: She is not in acute distress.  HENT:      Head: Normocephalic and atraumatic.      Nose: Nose normal. No rhinorrhea.      Mouth/Throat:      Pharynx: No oropharyngeal exudate or posterior oropharyngeal erythema.   Eyes:      General: No scleral icterus.        Right eye: No discharge.         Left eye: No discharge.   Neck:       Musculoskeletal: Neck supple. No neck rigidity.   Cardiovascular:      Rate and Rhythm: Normal rate and regular rhythm.      Heart sounds: Normal heart sounds. No murmur. No friction rub. No gallop.    Pulmonary:      Effort: Pulmonary effort is normal. No respiratory distress.      Breath sounds: Normal breath sounds. No stridor. No wheezing, rhonchi or rales.   Chest:      Chest wall: No tenderness.   Abdominal:      General: Bowel sounds are normal. There is no distension.      Palpations: Abdomen is soft. There is no mass.      Tenderness: There is no abdominal tenderness. There is no rebound.   Musculoskeletal:         General: No swelling or tenderness.   Skin:     General: Skin is warm and dry.      Coloration: Skin is not cyanotic or jaundiced.      Nails: There is no clubbing.     Neurological:      General: No focal deficit present.      Mental Status: She is alert and oriented to person, place, and time.      Cranial Nerves: No cranial nerve deficit.      Motor: No weakness.   Psychiatric:         Mood and Affect: Mood normal.         Behavior: Behavior normal.         Laboratory:  Recent Labs     09/02/20  1345   WBC 6.8   RBC 4.48   HEMOGLOBIN 13.0   HEMATOCRIT 41.1   MCV 91.7   MCH 29.0   MCHC 31.6*   RDW 50.4*   PLATELETCT 277   MPV 9.8     Recent Labs     09/02/20  1345   SODIUM 139   POTASSIUM 4.3   CHLORIDE 103   CO2 18*   GLUCOSE 246*   BUN 23*   CREATININE 0.86   CALCIUM 9.5     Recent Labs     09/02/20  1345   GLUCOSE 246*         No results for input(s): NTPROBNP in the last 72 hours.      Recent Labs     09/02/20  1345   TROPONINT 37*       Imaging:  DX-CHEST-PORTABLE (1 VIEW)   Final Result      1.  There is no acute cardiopulmonary process.            Assessment/Plan:  I anticipate this patient is appropriate for observation status at this time.    * Hypertensive urgency  Assessment & Plan  Continue losartan and Cardizem  We will add Aldactazide  Monitor blood pressure and order as needed  labetalol for SBP greater than 180 or DBP greater than 100  If Blood pressure remains resistant to treatment sitter work-up for secondary causes  Discussed with patient lifestyle changes and recommended home monitoring of blood pressure      Elevated troponin  Assessment & Plan  Likely demand ischemia secondary to hypertensive urgency  Trend troponin  Patient denies any chest pain or dyspnea  EKG reviewed with no acute ischemic changes    Type 2 diabetes mellitus with kidney complication, without long-term current use of insulin (HCC)- (present on admission)  Assessment & Plan  Insulin sliding scale monitor CBGs    Dyslipidemia- (present on admission)  Assessment & Plan  Continue atorvastatin

## 2020-09-03 NOTE — ASSESSMENT & PLAN NOTE
Checking a1c holding oral medication.   Will probably need insuline upon dc, DM educator consulted.

## 2020-09-03 NOTE — CARE PLAN
Problem: Communication  Goal: The ability to communicate needs accurately and effectively will improve  Outcome: PROGRESSING AS EXPECTED     Problem: Safety  Goal: Will remain free from injury  Outcome: PROGRESSING AS EXPECTED     Problem: Safety  Goal: Will remain free from falls  Outcome: PROGRESSING AS EXPECTED     Problem: Pain Management  Goal: Pain level will decrease to patient's comfort goal  Outcome: PROGRESSING AS EXPECTED     Problem: Infection  Goal: Will remain free from infection  Outcome: PROGRESSING AS EXPECTED

## 2020-09-03 NOTE — TELEPHONE ENCOUNTER
Received request via: Pharmacy    Was the patient seen in the last year in this department? Yes LOV  07/21/2020    Does the patient have an active prescription (recently filled or refills available) for medication(s) requested? No

## 2020-09-04 ENCOUNTER — PHARMACY VISIT (OUTPATIENT)
Dept: PHARMACY | Facility: MEDICAL CENTER | Age: 65
End: 2020-09-04
Payer: COMMERCIAL

## 2020-09-04 VITALS
BODY MASS INDEX: 32.8 KG/M2 | RESPIRATION RATE: 20 BRPM | OXYGEN SATURATION: 96 % | HEIGHT: 67 IN | WEIGHT: 209 LBS | SYSTOLIC BLOOD PRESSURE: 156 MMHG | TEMPERATURE: 97.1 F | DIASTOLIC BLOOD PRESSURE: 74 MMHG | HEART RATE: 67 BPM

## 2020-09-04 PROBLEM — R79.89 ELEVATED TROPONIN: Status: RESOLVED | Noted: 2020-09-02 | Resolved: 2020-09-04

## 2020-09-04 PROBLEM — I16.0 HYPERTENSIVE URGENCY: Status: RESOLVED | Noted: 2020-09-02 | Resolved: 2020-09-04

## 2020-09-04 LAB
ANION GAP SERPL CALC-SCNC: 11 MMOL/L (ref 7–16)
BASOPHILS # BLD AUTO: 0.6 % (ref 0–1.8)
BASOPHILS # BLD: 0.04 K/UL (ref 0–0.12)
BUN SERPL-MCNC: 28 MG/DL (ref 8–22)
CALCIUM SERPL-MCNC: 9.3 MG/DL (ref 8.5–10.5)
CHLORIDE SERPL-SCNC: 102 MMOL/L (ref 96–112)
CO2 SERPL-SCNC: 22 MMOL/L (ref 20–33)
CREAT SERPL-MCNC: 1.17 MG/DL (ref 0.5–1.4)
EOSINOPHIL # BLD AUTO: 0.11 K/UL (ref 0–0.51)
EOSINOPHIL NFR BLD: 1.6 % (ref 0–6.9)
ERYTHROCYTE [DISTWIDTH] IN BLOOD BY AUTOMATED COUNT: 49.7 FL (ref 35.9–50)
GLUCOSE BLD-MCNC: 163 MG/DL (ref 65–99)
GLUCOSE BLD-MCNC: 230 MG/DL (ref 65–99)
GLUCOSE SERPL-MCNC: 217 MG/DL (ref 65–99)
HCT VFR BLD AUTO: 37.3 % (ref 37–47)
HGB BLD-MCNC: 11.9 G/DL (ref 12–16)
IMM GRANULOCYTES # BLD AUTO: 0.03 K/UL (ref 0–0.11)
IMM GRANULOCYTES NFR BLD AUTO: 0.4 % (ref 0–0.9)
LYMPHOCYTES # BLD AUTO: 3.22 K/UL (ref 1–4.8)
LYMPHOCYTES NFR BLD: 47.7 % (ref 22–41)
MCH RBC QN AUTO: 29 PG (ref 27–33)
MCHC RBC AUTO-ENTMCNC: 31.9 G/DL (ref 33.6–35)
MCV RBC AUTO: 90.8 FL (ref 81.4–97.8)
MONOCYTES # BLD AUTO: 0.62 K/UL (ref 0–0.85)
MONOCYTES NFR BLD AUTO: 9.2 % (ref 0–13.4)
NEUTROPHILS # BLD AUTO: 2.73 K/UL (ref 2–7.15)
NEUTROPHILS NFR BLD: 40.5 % (ref 44–72)
NRBC # BLD AUTO: 0 K/UL
NRBC BLD-RTO: 0 /100 WBC
PLATELET # BLD AUTO: 314 K/UL (ref 164–446)
PMV BLD AUTO: 10.1 FL (ref 9–12.9)
POTASSIUM SERPL-SCNC: 4.2 MMOL/L (ref 3.6–5.5)
RBC # BLD AUTO: 4.11 M/UL (ref 4.2–5.4)
SODIUM SERPL-SCNC: 135 MMOL/L (ref 135–145)
WBC # BLD AUTO: 6.8 K/UL (ref 4.8–10.8)

## 2020-09-04 PROCEDURE — 700111 HCHG RX REV CODE 636 W/ 250 OVERRIDE (IP): Performed by: HOSPITALIST

## 2020-09-04 PROCEDURE — RXMED WILLOW AMBULATORY MEDICATION CHARGE: Performed by: HOSPITALIST

## 2020-09-04 PROCEDURE — 700102 HCHG RX REV CODE 250 W/ 637 OVERRIDE(OP): Performed by: HOSPITALIST

## 2020-09-04 PROCEDURE — 96372 THER/PROPH/DIAG INJ SC/IM: CPT | Mod: XU

## 2020-09-04 PROCEDURE — 700101 HCHG RX REV CODE 250: Performed by: HOSPITALIST

## 2020-09-04 PROCEDURE — A9270 NON-COVERED ITEM OR SERVICE: HCPCS | Performed by: HOSPITALIST

## 2020-09-04 PROCEDURE — G0378 HOSPITAL OBSERVATION PER HR: HCPCS

## 2020-09-04 PROCEDURE — 80048 BASIC METABOLIC PNL TOTAL CA: CPT

## 2020-09-04 PROCEDURE — 96365 THER/PROPH/DIAG IV INF INIT: CPT

## 2020-09-04 PROCEDURE — 96376 TX/PRO/DX INJ SAME DRUG ADON: CPT

## 2020-09-04 PROCEDURE — 700105 HCHG RX REV CODE 258: Performed by: HOSPITALIST

## 2020-09-04 PROCEDURE — 82962 GLUCOSE BLOOD TEST: CPT

## 2020-09-04 PROCEDURE — 99217 PR OBSERVATION CARE DISCHARGE: CPT | Performed by: HOSPITALIST

## 2020-09-04 PROCEDURE — 85025 COMPLETE CBC W/AUTO DIFF WBC: CPT

## 2020-09-04 PROCEDURE — 36415 COLL VENOUS BLD VENIPUNCTURE: CPT

## 2020-09-04 RX ORDER — HYDROCHLOROTHIAZIDE 25 MG/1
25 TABLET ORAL DAILY
Qty: 30 TAB | Refills: 0 | Status: SHIPPED
Start: 2020-09-04 | End: 2020-10-12 | Stop reason: SDUPTHER

## 2020-09-04 RX ORDER — SPIRONOLACTONE 25 MG/1
25 TABLET ORAL DAILY
Qty: 30 TAB | Refills: 0 | Status: SHIPPED
Start: 2020-09-04 | End: 2020-09-30 | Stop reason: SDUPTHER

## 2020-09-04 RX ORDER — CEFDINIR 300 MG/1
300 CAPSULE ORAL 2 TIMES DAILY
Qty: 6 CAP | Refills: 0 | Status: SHIPPED
Start: 2020-09-05 | End: 2020-09-08

## 2020-09-04 RX ORDER — INSULIN GLARGINE 100 [IU]/ML
7 INJECTION, SOLUTION SUBCUTANEOUS EVERY EVENING
Qty: 3 ML | Refills: 0 | Status: SHIPPED
Start: 2020-09-04 | End: 2020-09-08 | Stop reason: SDUPTHER

## 2020-09-04 RX ORDER — HYDRALAZINE HYDROCHLORIDE 25 MG/1
75 TABLET, FILM COATED ORAL EVERY 8 HOURS
Qty: 270 TAB | Refills: 0 | Status: ON HOLD
Start: 2020-09-04 | End: 2020-11-08

## 2020-09-04 RX ORDER — ASPIRIN 81 MG/1
81 TABLET ORAL DAILY
Qty: 30 TAB | Refills: 0 | Status: SHIPPED
Start: 2020-09-04

## 2020-09-04 RX ORDER — LANCETS 30 GAUGE
EACH MISCELLANEOUS
Qty: 100 EACH | Refills: 0 | Status: SHIPPED
Start: 2020-09-04

## 2020-09-04 RX ORDER — HYDRALAZINE HYDROCHLORIDE 25 MG/1
50 TABLET, FILM COATED ORAL EVERY 8 HOURS
Status: DISCONTINUED | OUTPATIENT
Start: 2020-09-04 | End: 2020-09-04

## 2020-09-04 RX ORDER — HYDRALAZINE HYDROCHLORIDE 25 MG/1
75 TABLET, FILM COATED ORAL EVERY 8 HOURS
Status: DISCONTINUED | OUTPATIENT
Start: 2020-09-04 | End: 2020-09-04 | Stop reason: HOSPADM

## 2020-09-04 RX ORDER — SPIRONOLACTONE AND HYDROCHLOROTHIAZIDE 25; 25 MG/1; MG/1
1 TABLET ORAL DAILY
Qty: 30 TAB | Refills: 0 | Status: SHIPPED
Start: 2020-09-05 | End: 2020-09-04 | Stop reason: CLARIF

## 2020-09-04 RX ORDER — GLYBURIDE 5 MG/1
5 TABLET ORAL 2 TIMES DAILY WITH MEALS
Status: DISCONTINUED | OUTPATIENT
Start: 2020-09-04 | End: 2020-09-04 | Stop reason: HOSPADM

## 2020-09-04 RX ADMIN — DILTIAZEM HYDROCHLORIDE 240 MG: 240 CAPSULE, COATED, EXTENDED RELEASE ORAL at 06:14

## 2020-09-04 RX ADMIN — LOSARTAN POTASSIUM 100 MG: 50 TABLET, FILM COATED ORAL at 06:14

## 2020-09-04 RX ADMIN — INSULIN HUMAN 3 UNITS: 100 INJECTION, SOLUTION PARENTERAL at 12:14

## 2020-09-04 RX ADMIN — ENOXAPARIN SODIUM 40 MG: 40 INJECTION SUBCUTANEOUS at 06:16

## 2020-09-04 RX ADMIN — CEFTRIAXONE SODIUM 2 G: 2 INJECTION, POWDER, FOR SOLUTION INTRAMUSCULAR; INTRAVENOUS at 12:47

## 2020-09-04 RX ADMIN — LABETALOL HYDROCHLORIDE 10 MG: 5 INJECTION, SOLUTION INTRAVENOUS at 08:27

## 2020-09-04 RX ADMIN — HYDRALAZINE HYDROCHLORIDE 25 MG: 25 TABLET, FILM COATED ORAL at 06:14

## 2020-09-04 RX ADMIN — DOCUSATE SODIUM 50 MG AND SENNOSIDES 8.6 MG 2 TABLET: 8.6; 5 TABLET, FILM COATED ORAL at 06:15

## 2020-09-04 RX ADMIN — INSULIN HUMAN 2 UNITS: 100 INJECTION, SOLUTION PARENTERAL at 08:20

## 2020-09-04 RX ADMIN — SPIRONOLACTONE AND HYDROCHLOROTHIAZIDE 1 TABLET: 25; 25 TABLET ORAL at 06:17

## 2020-09-04 RX ADMIN — HYDRALAZINE HYDROCHLORIDE 75 MG: 25 TABLET, FILM COATED ORAL at 13:20

## 2020-09-04 ASSESSMENT — PAIN DESCRIPTION - PAIN TYPE
TYPE: ACUTE PAIN
TYPE: ACUTE PAIN

## 2020-09-04 NOTE — CARE PLAN
Problem: Communication  Goal: The ability to communicate needs accurately and effectively will improve  Outcome: PROGRESSING AS EXPECTED     Problem: Safety  Goal: Will remain free from injury  Outcome: PROGRESSING AS EXPECTED  Goal: Will remain free from falls  Outcome: PROGRESSING AS EXPECTED     Problem: Mobility  Goal: Risk for activity intolerance will decrease  Outcome: PROGRESSING AS EXPECTED

## 2020-09-04 NOTE — PROGRESS NOTES
Assessment completed. Pt denies pain at this time. Sinus rhythm on telemetry monitor.  POC discussed (Bp control and diabetic education) communication board updated. Bed in locked, lowest position.  Call light within reach. All Needs met.

## 2020-09-04 NOTE — DISCHARGE SUMMARY
Discharge Summary    CHIEF COMPLAINT ON ADMISSION  Chief Complaint   Patient presents with   • Hypertension     taking htn meds, sent from GI MD office for htn prior to colonoscopy       Reason for Admission  BP Problem     Admission Date  9/2/2020    CODE STATUS  Full Code    HPI & HOSPITAL COURSE    Please see original H&P for specific information, patient admitted due to hypertensive urgency, patient passing through stressful situation at home bp >200 she was started on po bp medication, US kidney did not show renal artery stenosis showed incidental finding of Elevated velocities are seen at the proximal superficial mesenteric artery  velocity consistent with 70-99% stenosis, patient denies abdominal pain, melena, hematochezia, findings were discussed with patient and advised to f/u with primary care for close monitoring as outpatient patient to continue on statin and low dose ASA, patient's bp is better controlled her a1c is 9.6 dm educator consulted, patient will be started on low dose lantus and continue on oral medication, patient is feeling better, she is tolerating diet, she is alert and oriented follows commands, patient expressed understanding of her dc plan and agreed with it, all questions answered.        Therefore, she is discharged in good and stable condition to home with close outpatient follow-up.      Discharge Date  9/4/2020    FOLLOW UP ITEMS POST DISCHARGE  Primary care    DISCHARGE DIAGNOSES  Principal Problem (Resolved):    Hypertensive urgency POA: Unknown  Active Problems:    Dyslipidemia POA: Yes    Type 2 diabetes mellitus with kidney complication, without long-term current use of insulin (HCC) POA: Yes  Resolved Problems:    Elevated troponin POA: Unknown      FOLLOW UP  Future Appointments   Date Time Provider Department Center   9/15/2020  1:40 PM Genoveva Roach, A.P.R.N. [facility]IEL Roach A.P.R.N.  [address] [facility] [id number]  [phone]    In 1  week        MEDICATIONS ON DISCHARGE     Medication List      START taking these medications      Instructions   Blood Glucose Test Strips   Doctor's comments: Or per formulary preference. ICD-10 code: E11.65 Uncontrolled type 2 Diabetes Mellitus  Use one Claudette Contour Next strip to test blood sugar once daily .     cefdinir 300 MG Caps  Start taking on: September 5, 2020  Commonly known as: OMNICEF   Take 1 Cap by mouth 2 times a day for 3 days.  Dose: 300 mg     glucose blood strip   1 Strip by Other route every day. to test blood sugar  Dose: 1 Each     hydrALAZINE 25 MG Tabs  Commonly known as: APRESOLINE   Doctor's comments: MD authorized 30-day supply #270  Take 3 Tabs by mouth every 8 hours.  Dose: 75 mg     hydroCHLOROthiazide 25 MG Tabs  Commonly known as: HYDRODIURIL   Doctor's comments: meds to beds  Take 1 Tab by mouth every day.  Dose: 25 mg     Insulin Pen Needle 32 G x 4 mm   Doctor's comments: Per patient/formulary preference. ICD-10 code: E11.65 Uncontrolled type 2 Diabetes Mellitus  Use one pen needle in pen device to inject insulin once daily.     Lancets   Doctor's comments: Or per formulary preference. ICD-10 code: E11.65 Uncontrolled type 2 Diabetes Mellitus  Use one Claudette Contour Next lancet to test blood sugar once daily .     Lantus SoloStar 100 UNIT/ML Sopn injection  Generic drug: insulin glargine   Inject 7 Units as instructed every evening.  Dose: 7 Units     spironolactone 25 MG Tabs  Commonly known as: ALDACTONE   Doctor's comments: meds to beds  Take 1 Tab by mouth every day.  Dose: 25 mg        CONTINUE taking these medications      Instructions   atorvastatin 40 MG Tabs  Commonly known as: LIPITOR   TAKE ONE TABLET BY MOUTH EVERY NIGHT AT BEDTIME     DILTIAZem  MG Cp24  Commonly known as: Cartia XT   Take 1 Cap by mouth every day.  Dose: 240 mg     glyBURIDE 5 MG Tabs  Commonly known as: DIABETA   Take 1 Tab by mouth 2 times a day, with meals. Please update labs before next  refill  Dose: 5 mg     losartan 100 MG Tabs  Commonly known as: COZAAR   Take 1 Tab by mouth every day.  Dose: 100 mg     metformin 1000 MG tablet  Commonly known as: GLUCOPHAGE   Take 1 Tab by mouth 2 times a day, with meals.  Dose: 1,000 mg            Allergies  Allergies   Allergen Reactions   • Codeine Rash and Itching     Vicodin doesn't cause her any problems   • Ace Inhibitors      cough   • Flagyl [Metronidazole] Vomiting and Nausea       DIET  Orders Placed This Encounter   Procedures   • Diet Order Cardiac, Diabetic     Standing Status:   Standing     Number of Occurrences:   1     Order Specific Question:   Diet:     Answer:   Cardiac [6]     Order Specific Question:   Diet:     Answer:   Diabetic [3]       ACTIVITY  As tolerated.  Weight bearing as tolerated    CONSULTATIONS  none    PROCEDURES  none    LABORATORY  Lab Results   Component Value Date    SODIUM 135 09/04/2020    POTASSIUM 4.2 09/04/2020    CHLORIDE 102 09/04/2020    CO2 22 09/04/2020    GLUCOSE 217 (H) 09/04/2020    BUN 28 (H) 09/04/2020    CREATININE 1.17 09/04/2020        Lab Results   Component Value Date    WBC 6.8 09/04/2020    HEMOGLOBIN 11.9 (L) 09/04/2020    HEMATOCRIT 37.3 09/04/2020    PLATELETCT 314 09/04/2020        Total time of the discharge process exceeds 35 minutes.

## 2020-09-04 NOTE — PROGRESS NOTES
Assumed care of pt at 0745. Report received and bedside rounding completed with night RN. Pt is calm no SOB, or any acute distress noted.

## 2020-09-04 NOTE — DISCHARGE INSTRUCTIONS
Discharge Instructions    Discharged to home by car with relative. Discharged via wheelchair, hospital escort: Yes.  Special equipment needed: Not Applicable    Be sure to schedule a follow-up appointment with your primary care doctor or any specialists as instructed.     Discharge Plan:        I understand that a diet low in cholesterol, fat, and sodium is recommended for good health. Unless I have been given specific instructions below for another diet, I accept this instruction as my diet prescription.   Other diet: Regular as tolerated. DIABETIC DIET    Special Instructions: None    · Is patient discharged on Warfarin / Coumadin?   No     Depression / Suicide Risk    As you are discharged from this [facility] facility, it is important to learn how to keep safe from harming yourself.    Recognize the warning signs:  · Abrupt changes in personality, positive or negative- including increase in energy   · Giving away possessions  · Change in eating patterns- significant weight changes-  positive or negative  · Change in sleeping patterns- unable to sleep or sleeping all the time   · Unwillingness or inability to communicate  · Depression  · Unusual sadness, discouragement and loneliness  · Talk of wanting to die  · Neglect of personal appearance   · Rebelliousness- reckless behavior  · Withdrawal from people/activities they love  · Confusion- inability to concentrate     If you or a loved one observes any of these behaviors or has concerns about self-harm, here's what you can do:  · Talk about it- your feelings and reasons for harming yourself  · Remove any means that you might use to hurt yourself (examples: pills, rope, extension cords, firearm)  · Get professional help from the community (Mental Health, Substance Abuse, psychological counseling)  · Do not be alone:Call your Safe Contact- someone whom you trust who will be there for you.  · Call your local CRISIS HOTLINE 516-8546 or 232-467-1207  · Call your  local Children's Mobile Crisis Response Team [place] (219) 418-2647 or www.[facility].PanX  · Call the toll free National Suicide Prevention Hotlines   · National Suicide Prevention Lifeline 793-646-MORY (7393)  · National Hope Line Network 800-SUICIDE (493-5222)      Discharge Instructions per Bowen Ackerman M.D.    Follow up with primary care doctor in 1 week  Keep checking blood pressure at home keep a log to show to primary care doctor for further blood pressure adjustment    DIET: healthy diet    ACTIVITY: as tolerated    DIAGNOSIS: hypertensive urgency    Return to ER if symptoms return, fever, chills, shortness of breath, chest pain.

## 2020-09-05 NOTE — CONSULTS
Diabetes education: Pt seen before discharge.  Pt has a hx of diabetes on Metformin and Glyburide at home. Pt was admitted with blood sugar of 246 and Hg a1c of  9.6%.   Pt was seen yesterday and education started including what diabetes was, need for insulin, goals for blood sugars, what effects blood sugars and stress management.  Pt was to give her insulin with nursing. Pt has a meter but it was an old  Accucheck meter not covered by [employer]  Met with pt today and taught to use Contour next ez meter, reviewed finger sticks and taught to use insulin pens. Reviewed diet, [facility] dm book given and recommendations for discharge supplies sent to MD to be sent to [facility] .  Questions answered.

## 2020-09-05 NOTE — PROGRESS NOTES
Pt in no acute distress no SOB. Discharge instruction information reviewed with pt. All questions answered at bed side. PIV removed. Meds to bed, DM educator provided pt with all supplies.  instructions given. Pt left via wheel chair with tech.

## 2020-09-05 NOTE — DISCHARGE PLANNING
Meds-to-Beds: Discharge prescription orders listed below delivered to patient's bedside. RN notified. Patient counseled. Patient elected to have co-payment billed to patient account.     Combination spironolactone/hctz not covered. Insurance covered spironolactone and hctz separately. Discussed with provider and orders updated. MD authorized 30-day supply of hydralazine for patient as well.       Brenda Davidson   Home Medication Instructions CHACHA:[id number]    Printed on:09/04/20 1801   Medication Information                      aspirin 81 MG EC tablet  Take 1 Tab by mouth every day.             Blood Glucose Test Strips  Use one Claudette Contour Next strip to test blood sugar once daily .             cefdinir (OMNICEF) 300 MG Cap  Take 1 Cap by mouth 2 times a day for 3 days.             hydrALAZINE (APRESOLINE) 25 MG Tab  Take 3 Tabs by mouth every 8 hours.             hydroCHLOROthiazide (HYDRODIURIL) 25 MG Tab  Take 1 Tablet by mouth every day.             insulin glargine (LANTUS SOLOSTAR) 100 UNIT/ML Solution Pen-injector injection  Inject 7 Units as instructed every evening.             Insulin Pen Needle 32 G x 4 mm  Use one pen needle in pen device to inject insulin once daily.             Lancets  Use one Claudette Contour Next lancet to test blood sugar once daily .             spironolactone (ALDACTONE) 25 MG Tab  Take 1 Tablet by mouth every day.               Rebeca Reynolds, PharmD

## 2020-09-08 DIAGNOSIS — N18.30 TYPE 2 DIABETES MELLITUS WITH STAGE 3 CHRONIC KIDNEY DISEASE, WITHOUT LONG-TERM CURRENT USE OF INSULIN (HCC): ICD-10-CM

## 2020-09-08 DIAGNOSIS — E11.22 TYPE 2 DIABETES MELLITUS WITH STAGE 3 CHRONIC KIDNEY DISEASE, WITHOUT LONG-TERM CURRENT USE OF INSULIN (HCC): ICD-10-CM

## 2020-09-08 RX ORDER — INSULIN GLARGINE 100 [IU]/ML
7 INJECTION, SOLUTION SUBCUTANEOUS EVERY EVENING
Qty: 3 ML | Refills: 0 | Status: SHIPPED | OUTPATIENT
Start: 2020-09-08 | End: 2020-10-26 | Stop reason: SDUPTHER

## 2020-09-08 NOTE — PROGRESS NOTES
Requested Prescriptions     Signed Prescriptions Disp Refills   • insulin glargine (LANTUS SOLOSTAR) 100 UNIT/ML Solution Pen-injector injection 3 mL 0     Sig: Inject 7 Units as instructed every evening.       JEANNE Kaminski.

## 2020-09-10 ENCOUNTER — PATIENT OUTREACH (OUTPATIENT)
Dept: HEALTH INFORMATION MANAGEMENT | Facility: OTHER | Age: 65
End: 2020-09-10

## 2020-09-11 NOTE — PROGRESS NOTES
Notified by Debi at [facility] that patient had called stating that [facility] pharmacy was unable to process Lantus insulin as it showed that we filled and dispensed on 9/4. Patient was provided with one insulin glargine pen at discharge with meds to beds service. Outbound call to Brenda. Patient confirms that she received the insulin pen and has been injecting 7 units every evening. She stated that she thought that she was supposed to have another prescription and didn't realize this was the same medication. Informed patient that pen is good for 28 days at room temperature and at that time she should get a new pen. Patient verbalized understanding that refill can be processed at [facility] pharmacy near the end of the month. Denies further questions/ concerns at this time.     Rebeca Reynolds, PharmD

## 2020-09-21 ENCOUNTER — OFFICE VISIT (OUTPATIENT)
Dept: MEDICAL GROUP | Facility: PHYSICIAN GROUP | Age: 65
End: 2020-09-21
Payer: MEDICARE

## 2020-09-21 VITALS
HEART RATE: 80 BPM | HEIGHT: 67 IN | RESPIRATION RATE: 14 BRPM | BODY MASS INDEX: 32.33 KG/M2 | DIASTOLIC BLOOD PRESSURE: 64 MMHG | OXYGEN SATURATION: 93 % | WEIGHT: 206 LBS | SYSTOLIC BLOOD PRESSURE: 130 MMHG | TEMPERATURE: 97.7 F

## 2020-09-21 DIAGNOSIS — K55.1 MESENTERIC ARTERY STENOSIS (HCC): ICD-10-CM

## 2020-09-21 DIAGNOSIS — Z09 HOSPITAL DISCHARGE FOLLOW-UP: ICD-10-CM

## 2020-09-21 DIAGNOSIS — Z23 NEED FOR VACCINATION: ICD-10-CM

## 2020-09-21 DIAGNOSIS — I10 ESSENTIAL HYPERTENSION: ICD-10-CM

## 2020-09-21 DIAGNOSIS — N18.30 TYPE 2 DIABETES MELLITUS WITH STAGE 3 CHRONIC KIDNEY DISEASE, WITHOUT LONG-TERM CURRENT USE OF INSULIN (HCC): ICD-10-CM

## 2020-09-21 DIAGNOSIS — R19.7 FREQUENT DIARRHEA: ICD-10-CM

## 2020-09-21 DIAGNOSIS — E11.22 TYPE 2 DIABETES MELLITUS WITH STAGE 3 CHRONIC KIDNEY DISEASE, WITHOUT LONG-TERM CURRENT USE OF INSULIN (HCC): ICD-10-CM

## 2020-09-21 DIAGNOSIS — E78.5 DYSLIPIDEMIA: ICD-10-CM

## 2020-09-21 PROCEDURE — G0008 ADMIN INFLUENZA VIRUS VAC: HCPCS | Performed by: NURSE PRACTITIONER

## 2020-09-21 PROCEDURE — 99215 OFFICE O/P EST HI 40 MIN: CPT | Mod: 25 | Performed by: NURSE PRACTITIONER

## 2020-09-21 PROCEDURE — 90662 IIV NO PRSV INCREASED AG IM: CPT | Performed by: NURSE PRACTITIONER

## 2020-09-21 RX ORDER — ATORVASTATIN CALCIUM 40 MG/1
TABLET, FILM COATED ORAL
Qty: 100 TAB | Refills: 0 | Status: SHIPPED | OUTPATIENT
Start: 2020-09-21 | End: 2021-01-18

## 2020-09-21 RX ORDER — MELOXICAM 15 MG/1
TABLET ORAL
COMMUNITY
Start: 2020-09-01 | End: 2020-10-16

## 2020-09-21 ASSESSMENT — FIBROSIS 4 INDEX: FIB4 SCORE: 0.97

## 2020-09-21 NOTE — ASSESSMENT & PLAN NOTE
Chronic, ongoing.  Is now following with gastroenterology for this issue.  Colonoscopy was canceled due to hypertensive urgency.  Patient needs to call and schedule procedure.

## 2020-09-21 NOTE — ASSESSMENT & PLAN NOTE
"New problem to examiner.  Ultrasound of kidneys completed in hospital during recent hospitalization for hypertensive urgency did not show renal artery stenosis, it did show an incidental finding of elevated velocities \"seen at the proximal superficial mesenteric artery velocity consistent with 70-99% stenosis.  "
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
## 2020-09-21 NOTE — ASSESSMENT & PLAN NOTE
Chronic, ongoing.  Patient continues glyburide 5 mg twice daily and metformin 1000 mg twice daily.  Patient reports that she was taking the evening dose before bed.  On discharge from recent hospitalization, patient was prescribed Lantus 7 units nightly.  Reports that she did have an episode where she woke up in the morning around 3 AM sweating and shaking, checked her blood sugar at that time which was 76, ate a snack and felt better.  Reports that her morning fasting blood sugars are running in the 130s.  [facility].

## 2020-09-21 NOTE — ASSESSMENT & PLAN NOTE
"Chronic, improving.  Currently taking atorvastatin 40 mg nightly as directed.   Denies side effects of the medication--no myalgias or abdominal pain.  Reports diet is \"improving\".   She is not following a low-cholesterol diet.  She is not exercising regularly.  She is taking ASA daily.  She denies dizziness, claudication, or chest pain.  Due for updated lab work.   3/10/2015 06:37 6/22/2015 08:04 4/12/2016 09:38 9/11/2018 06:39   Cholesterol,Tot 195 177 189 166   Triglycerides 194 (H) 190 (H) 287 (H) 170 (H)   HDL 45 45 46 45    (H) 94 86 87     "

## 2020-09-23 PROBLEM — Z09 HOSPITAL DISCHARGE FOLLOW-UP: Status: RESOLVED | Noted: 2020-09-23 | Resolved: 2020-09-23

## 2020-09-23 PROBLEM — Z09 HOSPITAL DISCHARGE FOLLOW-UP: Status: ACTIVE | Noted: 2020-09-23

## 2020-09-24 NOTE — ASSESSMENT & PLAN NOTE
Patient was admitted to the clinical decision unit on 9/2/2020 and discharged home on 9/4/2020.  Patient was admitted for hypertensive urgency.  The patient was prepped for colonoscopy, systolic blood pressure was greater than 200, colonoscopy was not completed and the patient was referred to the emergency room.  Patient reports that she has continued to take her antihypertensives hydrochlorothiazide 25 mg/day and losartan 100 mg/day.  She was discharged from the hospital with hydralazine 75 mg every 8 hours, which she reports that she is taking as prescribed.  States that blood pressures at home are ranging 113-134/66 since discharge.  She was advised to continue aspirin 81 mg/day and statin medication.  Ultrasound of kidney while admitted did not show renal artery stenosis, but did have an incidental finding of elevated velocities at the proximal superficial mesenteric artery velocity consistent with 70-99% stenosis.  Patient reports that she was told by inpatient provider that this may be contributing to her chronic abdominal pain.
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
## 2020-09-28 ENCOUNTER — TELEPHONE (OUTPATIENT)
Dept: VASCULAR LAB | Facility: MEDICAL CENTER | Age: 65
End: 2020-09-28

## 2020-09-30 DIAGNOSIS — I10 ESSENTIAL HYPERTENSION: ICD-10-CM

## 2020-09-30 RX ORDER — SPIRONOLACTONE 25 MG/1
25 TABLET ORAL DAILY
Qty: 90 TAB | Refills: 0 | Status: SHIPPED
Start: 2020-09-30 | End: 2020-10-16

## 2020-10-01 NOTE — PROGRESS NOTES
Requested Prescriptions     Signed Prescriptions Disp Refills   • spironolactone (ALDACTONE) 25 MG Tab 90 Tab 0     Sig: Take 1 Tab by mouth every day.       JEANNE Kaminski.

## 2020-10-12 DIAGNOSIS — I10 ESSENTIAL HYPERTENSION: ICD-10-CM

## 2020-10-12 RX ORDER — HYDROCHLOROTHIAZIDE 25 MG/1
25 TABLET ORAL DAILY
Qty: 30 TAB | Refills: 0 | Status: SHIPPED
Start: 2020-10-12 | End: 2020-10-16

## 2020-10-12 NOTE — PROGRESS NOTES
Requested Prescriptions     Signed Prescriptions Disp Refills   • hydroCHLOROthiazide (HYDRODIURIL) 25 MG Tab 30 Tab 0     Sig: Take 1 Tablet by mouth every day.       JEANNE Kaminski.

## 2020-10-16 ENCOUNTER — APPOINTMENT (OUTPATIENT)
Dept: RADIOLOGY | Facility: MEDICAL CENTER | Age: 65
End: 2020-10-16
Attending: NURSE PRACTITIONER
Payer: MEDICARE

## 2020-10-16 ENCOUNTER — OFFICE VISIT (OUTPATIENT)
Dept: VASCULAR LAB | Facility: MEDICAL CENTER | Age: 65
End: 2020-10-16
Attending: FAMILY MEDICINE
Payer: MEDICARE

## 2020-10-16 VITALS
BODY MASS INDEX: 33.43 KG/M2 | WEIGHT: 213 LBS | DIASTOLIC BLOOD PRESSURE: 59 MMHG | HEART RATE: 80 BPM | SYSTOLIC BLOOD PRESSURE: 134 MMHG | HEIGHT: 67 IN

## 2020-10-16 DIAGNOSIS — N26.1 LEFT RENAL ATROPHY: ICD-10-CM

## 2020-10-16 DIAGNOSIS — Z12.31 VISIT FOR SCREENING MAMMOGRAM: ICD-10-CM

## 2020-10-16 DIAGNOSIS — I10 ESSENTIAL HYPERTENSION: ICD-10-CM

## 2020-10-16 DIAGNOSIS — Z79.4 TYPE 2 DIABETES MELLITUS WITH STAGE 3A CHRONIC KIDNEY DISEASE, WITH LONG-TERM CURRENT USE OF INSULIN (HCC): ICD-10-CM

## 2020-10-16 DIAGNOSIS — E11.22 TYPE 2 DIABETES MELLITUS WITH STAGE 3A CHRONIC KIDNEY DISEASE, WITH LONG-TERM CURRENT USE OF INSULIN (HCC): ICD-10-CM

## 2020-10-16 DIAGNOSIS — I36.1 NONRHEUMATIC TRICUSPID VALVE REGURGITATION: ICD-10-CM

## 2020-10-16 DIAGNOSIS — I71.40 ABDOMINAL AORTIC ANEURYSM WITHOUT RUPTURE (HCC): ICD-10-CM

## 2020-10-16 DIAGNOSIS — K55.1 SUPERIOR MESENTERIC ARTERY STENOSIS (HCC): ICD-10-CM

## 2020-10-16 DIAGNOSIS — E66.9 CLASS 1 OBESITY WITH SERIOUS COMORBIDITY AND BODY MASS INDEX (BMI) OF 34.0 TO 34.9 IN ADULT, UNSPECIFIED OBESITY TYPE: ICD-10-CM

## 2020-10-16 DIAGNOSIS — E55.9 VITAMIN D INSUFFICIENCY: ICD-10-CM

## 2020-10-16 DIAGNOSIS — I51.7 MILD CONCENTRIC LEFT VENTRICULAR HYPERTROPHY (LVH): ICD-10-CM

## 2020-10-16 DIAGNOSIS — E78.5 DYSLIPIDEMIA: ICD-10-CM

## 2020-10-16 DIAGNOSIS — N18.31 CKD STAGE G3A/A1, GFR 45-59 AND ALBUMIN CREATININE RATIO <30 MG/G: ICD-10-CM

## 2020-10-16 DIAGNOSIS — I27.20 PULMONARY HYPERTENSION (HCC): ICD-10-CM

## 2020-10-16 DIAGNOSIS — N18.31 TYPE 2 DIABETES MELLITUS WITH STAGE 3A CHRONIC KIDNEY DISEASE, WITH LONG-TERM CURRENT USE OF INSULIN (HCC): ICD-10-CM

## 2020-10-16 PROCEDURE — 99204 OFFICE O/P NEW MOD 45 MIN: CPT | Performed by: FAMILY MEDICINE

## 2020-10-16 PROCEDURE — 99212 OFFICE O/P EST SF 10 MIN: CPT

## 2020-10-16 RX ORDER — EMPAGLIFLOZIN 10 MG/1
1 TABLET, FILM COATED ORAL DAILY
Qty: 90 TAB | Refills: 3 | Status: SHIPPED | OUTPATIENT
Start: 2020-10-16 | End: 2021-01-04 | Stop reason: SDUPTHER

## 2020-10-16 RX ORDER — CHLORTHALIDONE 25 MG/1
25 TABLET ORAL DAILY
Qty: 90 TAB | Refills: 3 | Status: ON HOLD
Start: 2020-10-16 | End: 2020-11-08

## 2020-10-16 RX ORDER — AMLODIPINE AND OLMESARTAN MEDOXOMIL 5; 40 MG/1; MG/1
1 TABLET ORAL
Qty: 90 TAB | Refills: 3 | Status: SHIPPED
Start: 2020-10-16 | End: 2020-10-21

## 2020-10-16 RX ORDER — SPIRONOLACTONE 50 MG/1
50 TABLET, FILM COATED ORAL EVERY MORNING
Qty: 90 TAB | Refills: 3 | Status: ON HOLD
Start: 2020-10-16 | End: 2020-11-08

## 2020-10-16 ASSESSMENT — ENCOUNTER SYMPTOMS
NERVOUS/ANXIOUS: 0
WEAKNESS: 0
DOUBLE VISION: 0
ABDOMINAL PAIN: 1
DIZZINESS: 0
DEPRESSION: 0
FEVER: 0
HEARTBURN: 0
NAUSEA: 0
DIARRHEA: 1
CHILLS: 0
ORTHOPNEA: 0
BRUISES/BLEEDS EASILY: 0
WHEEZING: 0
TREMORS: 0
SORE THROAT: 0
HEMOPTYSIS: 0
BLURRED VISION: 0
PALPITATIONS: 0
BLOOD IN STOOL: 1
SHORTNESS OF BREATH: 0
VOMITING: 0
INSOMNIA: 0
FOCAL WEAKNESS: 0
SEIZURES: 0
COUGH: 0
MYALGIAS: 0
HEADACHES: 0

## 2020-10-16 ASSESSMENT — FIBROSIS 4 INDEX: FIB4 SCORE: 0.97

## 2020-10-16 NOTE — PATIENT INSTRUCTIONS
- continue all current medications, see updated medication list for changes     BLOOD PRESSURE:  - if you notice BP > 140/>90 for more than 3 days, then contact our office ([facility], [phone]) for further instructions    If you are being treated for blood pressure today: please be advised that stabilizing BP may require multiple med changes and close monitoring over the next several months and initially there may be additional imaging and labs and the possibility of adverse drug reactions. Variability of your blood pressure and heart rate may occur until we have things stabilized.  Please feel free to contact our office as needed for questions or concerns.        SODIUM RESTRICTIONS:   - consume no more than 2,300mg of sodium daily (look at food labels) ,or if you have high BP then reduce to no more than 1,500mg of sodium daily   For more information visit: https://www.OOTU/contents/low-sodium-diet-the-basics/print?eipohHpi=7836&source=see_link       DASH DIET:  Overview: (https://www.heart.org/en/health-topics/high-blood-pressure/changes-you-can-make-to-manage-high-blood-pressure/managing-blood-pressure-with-a-heart-healthy-diet)  Tips for shopping:  https://www.[facility].org/healthy-lifestyle/nutrition-and-healthy-eating/in-depth/dash-diet/art-56849744?p=1   DASH is a flexible and balanced eating plan that helps create a heart-healthy eating style for life.  The DASH eating plan requires no special foods and instead provides daily and weekly nutritional goals. This plan recommends:  · Eating vegetables, fruits, and whole grains  · Including fat-free or low-fat dairy products, fish, poultry, beans, nuts, and vegetable oils  · Limiting foods that are high in saturated fat, such as fatty meats, full-fat dairy products, and tropical oils such as coconut, palm kernel, and palm oils  · Limiting sugar-sweetened beverages and sweets.  Based on these recommendations, the following table  shows examples of daily and weekly servings that meet DASH eating plan targets for a 2,947-bmouagd-x-day diet.    CHOLESTEROL-REDUCING DIETS:  1) AHA diet  (http://www.heart.org/en/healthy-living/healthy-eating/eat-smart/nutrition-basics/aha-diet-and-lifestyle-recommendations)   2) Mediterranean diet (http://www.heart.org/en/healthy-living/healthy-eating/eat-smart/nutrition-basics/mediterranean-diet)   3) Lowering triglycerides: (http://my.americanheart.org/idc/groups/ahamah-public/@wc/@sop/@[facility]/documents/downloadable/[facility]_425988.pdf)     PHYSICAL ACTIVITY:  Unless directed otherwise at today's visit or you are physically incapable due to your current health or medical conditions, it is advised per the American Heart Association to engage in moderate to vigorous physical activity such as brisk walking, swimming, cycling, >150 minutes per week at 30-40 minutes per session, 3 to 5 times weekly.      GENERAL HEALTHY DIET ADVICE:  - the USDA food pattern (https://www.cnpp.usda.gov/USDAFoodPatterns)  - plate method (https://www.choosemyplate.gov/)  - consume diet that emphasizes intake of vegetables, fruits, and whole grains,  - use low fat diary products, poultry, fish, legumes, nontropical vegetable oils, nuts  - limit intake of sweets, sugar-sweetned beverages, and red meats   - reduce saturated and trans fats to <6% of your daily calories

## 2020-10-16 NOTE — PROGRESS NOTES
INITIAL VASCULAR VISIT  Subjective:   Brenda Davidson is a 65 y.o. y.o. female  who presents today 10/16/20  for   Chief Complaint   Patient presents with   • New Patient      Essential hypertension     initially referred by Genoveva Roach A.P.N for eval and mgmt of HTN, HLD, mesenteric a stenosis      HPI:    Mesenteric artery stenosis:  Over last 1 year having intermittent abd pain, not associated with eating.  Noted to have intermittent bloody stool and is pending colonoscopy for further eval.    Noted incidentally to have SMA stenosis on renal art duplex, severe.   No prior bowel ischemic changes or hospitalizations in the past.   No prior abd surgeries or Endovasc tx.     HTN:  Current HTN concerns: recently admitted for HTN emergency at [facility] 9/2/20.  SBP >250/150 with associated while undergoing pre-colonoscopy eval.  Denied associated sx.  Had meds adjusted and currently feeling fine with improved BP.   Had family stress during that time.   Current ADRs: No  HTN sx:  No current blurred or changed vision, chest pain, shortness of breath, headache, nausea, dizziness/vertigo   Home BP log: wrist cuff 140s/60s  24h ABPM completed: not tested  Adherence to current HTN meds: compliant all of the time, only taking hydralazine 75mg once daily.  Wondering if needs to continue all meds.     Pertinent HTN hx (reviewed at initial visit):   Age at HTN dx: >5yrs   (if female, any hx of pregnancy-related HTN): no  Past HTN medications: acei - cough, no amlodipine   HTN crises:  9/2020 - o/w no  Lifestyle factors:  High salt: No    EtOH: No  Interfering substances/contributing factors:     NSAIDs/Rivera-2i: Yes, Details: meloxicam for foot issue    Antidepressants (TCS, SNRI, MOAI): no   Atypical antipsych: no   Stimulants/sympathomimetics (illicit, OTC decongestants): no   OTC/Herbals (NaHCO3 antacids, weight loss meds, ephedra, etc): no   Chronic corticosteroid tx: no    Hormonal therapy (edilberto high-dose E2):  no   EPO: no    Immunosuppresants (cyclosporine, tacrolimus): no   Chemotx (tyrosine kinase inhib, angiogenesis inhib): no     Hyperlipidemia:    Stable, no current concerns  Current treatment: High intensity statin   atorva   Myalgias? No  Other adverse drug reactions? No  Last lipid profile: reviewed with patient, as noted below     Antiplatelet/anticoagulation:   Yes, Details: ASA, no bleeding noted     T2D:  Stable. No current symptoms reported.   Tolerating meds and no recent med changes.   Home blood sugars stable, reports no lows.   Last A1c   Lab Results   Component Value Date    HBA1C 9.6 (H) 09/02/2020      CKD:    Noted while in hospital in the G3a-b range with lower earlier this year.   Denies symptoms.  Negative JAISON duplex.      Sleeping disorder/BENJAMIN:   No     Hypothyroidism:   No , recent TSH normal     Clinical evidence of ASCVD:    1) hx of MI or other ACS:  no  2) coronary or other revasc procedure: no  3) TIA/ischemic CVA: no  4) AS-related PAD (including KIMMIE <0.9): no  5) other AS diseases (renal AS, AA due to AS, carotid plaque >50% stenosis): no  6) evidence of AS from imaging (angiography, stress tests, CAC >300 or >74%ile for age/gender): no    Major ASCVD risk factors (3):    1) Age (Men>44, women>54):  yes   2) Fhx early CHD (MI, SCD, coronary revasc procedures in men <55, women <65):  no   3) cigarette smoking:   reports that she has never smoked. She has never used smokeless tobacco.   4) high BP >139/89 or on tx: yes   5) Low HDL-C (<40 men, <50 women): yes   HDL   Date Value Ref Range Status   09/11/2018 45 >=40 mg/dL Final     Past Medical History:   Diagnosis Date   • Acute left-sided low back pain without sciatica 2/24/2020   • Anesthesia     hard time waking up   • Chronic low back pain    • Dental disorder    • DM (diabetes mellitus) (HCC)     oral medication   • Hospital discharge follow-up 9/23/2020   • Hypercholesteremia    • Hypertension    • Mass of cecum 9/15    tubulovillous  adenoma polyp   • Obesity (BMI 30.0-34.9)    • Other specified disorder of intestines      Past Surgical History:   Procedure Laterality Date   • CHOLECYSTECTOMY  8/21/2015    Procedure: LAPAROSCOPIC CHOLECYSTECTOMY;  Surgeon: Kris Garrison M.D.;  Location: SURGERY [facility];  Service:    • BOWEL RESECTION LAPAROSCOPIC  8/21/2015    Procedure: LAPAROSCOPIC ILEOCOLIC RESECTION;  Surgeon: Kris Garrison M.D.;  Location: SURGERY [facility];  Service:    • COLONOSCOPY WITH POLYP  8/13/15    multiple polyps, tumor cecum, diverticulosis, int hemorrhoids   • APPENDECTOMY  2015   • ARTHROSCOPY, KNEE  3/14    torn meniscus   • PELVISCOPY  4/6/2012    Performed by HILDA MACE at SURGERY SAME DAY [facility] ORS   • LYSIS ADHESIONS GYN  4/6/2012    Performed by HILDA MACE at SURGERY SAME DAY [facility] ORS   • COLONOSCOPY WITH POLYP  2012    3 polyps - hyperplastic, benign serrated, tubulovillous adenoma , scattered diverticula sigmoid colon - [facility]   • OTHER      TONSILLECTOMY   • OTHER      REPAIR LT URETER AGE 19      Current Outpatient Medications on File Prior to Visit   Medication Sig Dispense Refill   • atorvastatin (LIPITOR) 40 MG Tab TAKE ONE TABLET BY MOUTH EVERY NIGHT AT BEDTIME 100 Tab 0   • insulin glargine (LANTUS SOLOSTAR) 100 UNIT/ML Solution Pen-injector injection Inject 7 Units as instructed every evening. 3 mL 0   • Blood Glucose Test Strips Use one Claudette Contour Next strip to test blood sugar once daily . 100 Each 0   • Lancets Use one Claudette Contour Next lancet to test blood sugar once daily . 100 Each 0   • Insulin Pen Needle 32 G x 4 mm Use one pen needle in pen device to inject insulin once daily. 100 Each 0   • hydrALAZINE (APRESOLINE) 25 MG Tab Take 3 Tabs by mouth every 8 hours. 270 Tab 0   • aspirin 81 MG EC tablet Take 1 Tab by mouth every day. 30 Tab 0   • metformin (GLUCOPHAGE) 1000 MG tablet Take 1 Tab by mouth 2 times a day, with meals. 200 Tab 3   • glucose blood strip 1 Strip  by Other route every day. to test blood sugar 100 Strip 1     No current facility-administered medications on file prior to visit.      Allergies   Allergen Reactions   • Codeine Rash and Itching     Vicodin doesn't cause her any problems   • Ace Inhibitors      cough   • Flagyl [Metronidazole] Vomiting and Nausea       Family History   Problem Relation Age of Onset   • Cancer Mother         breast cancer   • Lung Disease Mother         emphysema   • Hypertension Father    • Alzheimer's Disease Sister    • Dementia Sister    • Hypertension Brother    • No Known Problems Sister    • No Known Problems Sister    • Hypertension Brother         Social History     Tobacco Use   • Smoking status: Never Smoker   • Smokeless tobacco: Never Used   Substance Use Topics   • Alcohol use: No     Alcohol/week: 0.0 oz   • Drug use: No     DIET AND EXERCISE:  Weight Change:stable   BMI Readings from Last 5 Encounters:   10/16/20 33.36 kg/m²   09/21/20 32.26 kg/m²   09/02/20 32.73 kg/m²   07/21/20 34.14 kg/m²   05/28/20 33.83 kg/m²      Diet: common adult, low CHO   Exercise: no regular exercise program   Review of Systems   Constitutional: Negative for chills, fever and malaise/fatigue.   HENT: Negative for nosebleeds, sore throat and tinnitus.    Eyes: Negative for blurred vision and double vision.   Respiratory: Negative for cough, hemoptysis, shortness of breath and wheezing.    Cardiovascular: Negative for chest pain, palpitations, orthopnea and leg swelling.   Gastrointestinal: Positive for abdominal pain (intermittent), blood in stool (occassional ) and diarrhea. Negative for heartburn, melena, nausea and vomiting.   Genitourinary: Negative for dysuria and hematuria.   Musculoskeletal: Negative for joint pain and myalgias.   Skin: Negative for itching and rash.   Neurological: Negative for dizziness, tremors, focal weakness, seizures, weakness and headaches.   Endo/Heme/Allergies: Does not bruise/bleed easily.  "  Psychiatric/Behavioral: Negative for depression. The patient is not nervous/anxious and does not have insomnia.       Objective:     Vitals:    10/16/20 0916 10/16/20 0924   BP: 149/61 134/59   BP Location: Left arm Left arm   Patient Position: Sitting Sitting   BP Cuff Size: Adult Adult   Pulse: 80 80   Weight: 96.6 kg (213 lb)    Height: 1.702 m (5' 7\")       BP Readings from Last 5 Encounters:   10/16/20 134/59   09/21/20 130/64   09/04/20 156/74   07/21/20 144/60   05/28/20 134/88      Body mass index is 33.36 kg/m².     Physical Exam  Vitals signs reviewed.   Constitutional:       Appearance: Normal appearance.   HENT:      Head: Normocephalic and atraumatic.      Nose: Nose normal.      Mouth/Throat:      Mouth: Mucous membranes are moist.      Pharynx: Oropharynx is clear.   Eyes:      Extraocular Movements: Extraocular movements intact.      Conjunctiva/sclera: Conjunctivae normal.   Neck:      Musculoskeletal: Normal range of motion and neck supple.   Cardiovascular:      Rate and Rhythm: Normal rate and regular rhythm.      Pulses: Normal pulses.           Carotid pulses are 2+ on the right side and 2+ on the left side.       Radial pulses are 2+ on the right side and 2+ on the left side.        Dorsalis pedis pulses are 2+ on the right side and 2+ on the left side.        Posterior tibial pulses are 2+ on the right side and 2+ on the left side.      Heart sounds: Normal heart sounds.      Comments:    Spider telangectasia:       RLE:  None      LLE: none   Varicosities:           RLE: none      LLE: none   Corona phlebectatica:      RLE:  None        LLE:  None   Cording:         RLE:  None     LLE: None     Pulmonary:      Effort: Pulmonary effort is normal.      Breath sounds: Normal breath sounds.   Abdominal:      General: Abdomen is flat. Bowel sounds are normal.      Palpations: Abdomen is soft.   Musculoskeletal:      Right lower leg: No edema.      Left lower leg: No edema.   Skin:     General: " Skin is warm and dry.      Capillary Refill: Capillary refill takes less than 2 seconds.   Neurological:      General: No focal deficit present.      Mental Status: She is alert and oriented to person, place, and time. Mental status is at baseline.   Psychiatric:         Mood and Affect: Mood normal.         Behavior: Behavior normal.                  Lab Results   Component Value Date    HBA1C 9.6 (H) 2020      Lab Results   Component Value Date    SODIUM 135 2020    POTASSIUM 4.2 2020    CHLORIDE 102 2020    CO2 22 2020    GLUCOSE 217 (H) 2020    BUN 28 (H) 2020    CREATININE 1.17 2020    IFAFRICA 56 (A) 2020    IFNOTAFR 46 (A) 2020        Lab Results   Component Value Date    WBC 6.8 2020    RBC 4.11 (L) 2020    HEMOGLOBIN 11.9 (L) 2020    HEMATOCRIT 37.3 2020    MCV 90.8 2020    MCH 29.0 2020    MCHC 31.9 (L) 2020    MPV 10.1 2020       VASCULAR IMAGING:   Last EKG:   Results for orders placed or performed during the hospital encounter of 20   EKG   Result Value Ref Range    Report       [facility] Emergency Dept.    Test Date:  2020  Pt Name:    NISHA GORDILLO                Department: ER  MRN:        [id number]                      Room:       [facility]  Gender:     Female                       Technician: [id number]  :        1955                   Requested By:GEE DUKE  Order #:    [id number]                    Reading MD: GEE DUKE MD    Measurements  Intervals                                Axis  Rate:       68                           P:          51  AL:         168                          QRS:        0  QRSD:       94                           T:          0  QT:         440  QTc:        469    Interpretive Statements  SINUS RHYTHM  LVH BY VOLTAGE  BORDERLINE T ABNORMALITIES, INFERIOR LEADS  Compared to ECG 2015 16:58:17  Left ventricular hypertrophy  now present  T-wave abnormality still present  Electronically Signed On 9-2-2020 14:34:19 PDT by GEE DUKE MD       CT abd/pelvis 2017  Duplex left kidney with a bifid ureter and mild hydroureteronephrosis. No renal, ureteral or bladder calculi are identified.   Renal cortical scarring on the left.   Hypodense right renal lesion is again noted likely a small cyst.   Hepatic steatosis with peripheral hyperenhancement likely related to ovarian perfusion.   Atherosclerotic plaque within an ectatic aorta.   Colonic diverticulosis.   Status post cholecystectomy and right hemicolectomy.    Echo 9/2020  No prior study is available for comparison.   Normal left ventricular chamber size.  Mild concentric left ventricular hypertrophy.  Left ventricular ejection fraction is visually estimated to be 60%.  Mild to moderate tricuspid regurgitation.  Right ventricular systolic pressure is estimated to be 45 mmHg.    JAISON duplex 9/3/20   No evidence of abdominal aortic aneurysm.    Elevated velocities are seen at the proximal superficial mesenteric artery    velocity consistent with 70-99% stenosis.    Velocities and waveforms are normal through the right renal artery.   The left renal artery is not seen from midline or flank.     Medical Decision Making:  Today's Assessment / Status / Plan:     1. Superior mesenteric artery stenosis (HCC)     2. CKD stage G3a/A1, GFR 45-59 and albumin creatinine ratio <30 mg/g     3. Type 2 diabetes mellitus with stage 3a chronic kidney disease, with long-term current use of insulin (HCC)  HEMOGLOBIN A1C    CBC WITHOUT DIFFERENTIAL    Comp Metabolic Panel    Lipid Profile    MICROALBUMIN CREAT RATIO URINE    REFERRAL TO PHARMACOTHERAPY SERVICE    Empagliflozin (JARDIANCE) 10 MG Tab   4. Vitamin D insufficiency     5. Dyslipidemia  MICROALBUMIN CREAT RATIO URINE    LDL, DIRECT    LIPOPROTEIN A   6. Essential hypertension  CBC WITHOUT DIFFERENTIAL    spironolactone (ALDACTONE) 50 MG Tab     chlorthalidone (HYGROTON) 25 MG Tab    amLODIPine-Olmesartan 5-40 MG Tab   7. Class 1 obesity with serious comorbidity and body mass index (BMI) of 34.0 to 34.9 in adult, unspecified obesity type     8. Mild concentric left ventricular hypertrophy (LVH)     9. Nonrheumatic tricuspid valve regurgitation     10. Pulmonary hypertension (HCC)     11. Left renal atrophy     12. Abdominal aortic aneurysm without rupture (HCC)  CTA ABDOMEN PELVIS W & W/O POST PROCESS     Patient Type: Primary Prevention and DM    Etiology of Established CVD if Present:   1) SMA stenosis   2) LVH  3) mod TR  4) pulm HTN    Antithrombotic therapy:  Indication: SMA stenosis   Anti-Platelet/Anti-Coagulant Tx: yes  - continue ASA 81mg daily   - stop nsaids     Lipid Management: Qualifies for Statin Therapy Based on 2018 ACC/AHA Guidelines: yes  Calculated 10-Year Risk of ASCVD (if LDL <189, no CKD G3b/4/5): N/A  Currently on Statin: Yes  NLA/ACC/AHA risk category: Very high:  T2D with 2 or more RFs  Tx goal: non-HDL-C <100,  LDL-C <70  (optional: apoB <80)  At goal:  no  Plan:  - reinforced ongoing TLC measures   - monitor labs in next few weeks   - continue atorvastatin 40mg   - add zetia if not at goal     Blood Pressure Management: Goal: ACC/AHA (2017) goal <130/80  Home BP at goal:  no  Office BP at goal:  No, SBP elevated only   Indications of end organ damage:   Echo/EKG: abnormal LVH    UACR: pending    Renal parenchymal disease:  abnormal G3a   Ophthalmo: pending    Device candidate? no  Plan:   Monitoring:   - start/continue home BP monitoring, reviewed correct technique, provide BP log and instructions  - order 24h ABPM:  UNDECIDED  - monitor lytes/gfr routinely   - contact office if BP consistently >140/>90 to discussion of tx adjustments   Medications:  ACEi/ARB: stop losartan, start olmesartan 40mg (combo)  DHP-CCB: start amlodipine 5mg (combo)  Thiazide: stop hctz, start chlorthalidone 25mg daily   Gaurang-receptor Antagonist:  "increase spironolactone to 50mg daily   Other:   Peripheral Alpha Blocker: not indicated   Loop: not indicated   BB: not indicated   Non-DHP-CCB: stopped dilt to start amlodipine   Centrally Acting Alpha Agonist: not indicated   Potassium-sparing diuretic: not indicated   DRI: not indicated    Direct Vasodilator: continue hydralazine but lower to 25mg TID for now, plan to stop after other meds titrated and consider for prn use only    Glycemic Status: Diabetic  Goal A1c < 7.0  Last A1c    Lab Results   Component Value Date    HBA1C 9.6 (H) 09/02/2020    UACR: not examined  Plan:  - continue current medication plan   - recommmend for routine care with PCP (or endocrine) to include regular A1c monitoring, annual albumin/creatinine ratio (ACR), annual diabetic retinopathy screening, foot exams, annual flu vaccine, and updates to pneumonia vaccines as appropriate       Smoking:   reports that she has never smoked. She has never used smokeless tobacco.   - continued complete avoidance of all tobacco products     Physical Activity: continue healthy activity to improve CV fitness, see care instructions for additional details     Weight Management and Nutrition: Dietary plan was discussed with patient at this visit including DASH, low sodium and/or as outlined in care instructions     Other:   1) left renal atrophy, hx of renal cortical scarring - unclear etiology  Consider pressor kidney   - update CTA abd for renal art assessment  - continue hydration, ARB   - monitor gfr/lytes   - consider PTRA if identified stenosis with lowering gfr and increased BP as may be \"pressor kidney\"  - unable to check faraz/renin due to spironolactone use     2) SMA stenosis, severe, no symptoms   Has apparent stable collaterals   - update CTA abd/pelvis for further assessment  - get colonoscopy asap, hold ASA 5d prior   - increase hydration prior to CTA     3) possible pulm HTN per echo RVSP 45mmHg, no symptoms  Associated with mod TR   - " consider repeat echo in 1 yr, eval with cardiology if worsening sx or pressures     Instructed to follow-up with PCP for remainder of adult medical needs: yes  We will partner with other providers in the management of established vascular disease and cardiometabolic risk factors.    Studies to Be Obtained: CTA abd/pelvis   Labs to Be Obtained: as noted above     Follow up in: 6 weeks    Silvino Saldaña M.D.  [facility]   [facility]

## 2020-10-19 ENCOUNTER — TELEPHONE (OUTPATIENT)
Dept: VASCULAR LAB | Facility: MEDICAL CENTER | Age: 65
End: 2020-10-19

## 2020-10-19 NOTE — TELEPHONE ENCOUNTER
Left VM for pt regarding referral for pharmacotherapy DM type from Dr. Saldaña. Asked pt to please call back to establish care.     Insurance: SCP  PCP: [facility]   Location: any       Joceline Rojo PharmD

## 2020-10-21 DIAGNOSIS — I10 ESSENTIAL HYPERTENSION: ICD-10-CM

## 2020-10-21 RX ORDER — AMLODIPINE BESYLATE 5 MG/1
5 TABLET ORAL DAILY
Qty: 30 TAB | Refills: 11 | Status: ON HOLD | OUTPATIENT
Start: 2020-10-21 | End: 2020-11-08 | Stop reason: SDUPTHER

## 2020-10-21 RX ORDER — OLMESARTAN MEDOXOMIL 40 MG/1
40 TABLET ORAL DAILY
Qty: 30 TAB | Refills: 11 | Status: ON HOLD
Start: 2020-10-21 | End: 2020-11-08

## 2020-10-22 ENCOUNTER — TELEPHONE (OUTPATIENT)
Dept: VASCULAR LAB | Facility: MEDICAL CENTER | Age: 65
End: 2020-10-22

## 2020-10-22 NOTE — TELEPHONE ENCOUNTER
Pt LVM w/ clinic about status on medication. LVM for pt informing her that pharmacy received prescription refill request from our office and she needs to follow up with them on the status for .

## 2020-10-22 NOTE — TELEPHONE ENCOUNTER
Combination product was nonformulary.  Prescription sent for amlodipine and olmesartan a separate tablets  MA to inform patient    Michael Bloch, MD  Vascular Medicine

## 2020-10-26 DIAGNOSIS — N18.30 TYPE 2 DIABETES MELLITUS WITH STAGE 3 CHRONIC KIDNEY DISEASE, WITHOUT LONG-TERM CURRENT USE OF INSULIN (HCC): ICD-10-CM

## 2020-10-26 DIAGNOSIS — E11.22 TYPE 2 DIABETES MELLITUS WITH STAGE 3 CHRONIC KIDNEY DISEASE, WITHOUT LONG-TERM CURRENT USE OF INSULIN (HCC): ICD-10-CM

## 2020-10-26 RX ORDER — INSULIN GLARGINE 100 [IU]/ML
7 INJECTION, SOLUTION SUBCUTANEOUS EVERY EVENING
Qty: 6 ML | Refills: 0 | Status: SHIPPED | OUTPATIENT
Start: 2020-10-26 | End: 2021-08-18 | Stop reason: SDUPTHER

## 2020-10-26 NOTE — PROGRESS NOTES
Requested Prescriptions     Signed Prescriptions Disp Refills   • insulin glargine (LANTUS SOLOSTAR) 100 UNIT/ML Solution Pen-injector injection 6 mL 0     Sig: Inject 7 Units as instructed every evening.       JEANNE aKminski.

## 2020-11-02 ENCOUNTER — TELEPHONE (OUTPATIENT)
Dept: VASCULAR LAB | Facility: MEDICAL CENTER | Age: 65
End: 2020-11-02

## 2020-11-02 NOTE — TELEPHONE ENCOUNTER
LM for pt to call back and schedule appt with Vascular care    Dr Bloch has an opening at 9a tomorrow. Pls see if she can see Dr BLoch then. Virtual ok. If not, APNs have some availability in afternoon tomorrow.     She said that their making her dizzy and she's not sure what to do. She said that one day last week she had to leave work early because she was feeling dizzy. She always wants to know when her next appt will be. Do you mind calling this pt?

## 2020-11-02 NOTE — TELEPHONE ENCOUNTER
Pt called back after Heidi JACKSON to let pt know that MB and the Nurse Practitioners have availability on 11/03. Pt said she would rather wait to see Dr. Saldaña on 11/23. She said if her symptoms get worse then she will just go to urgent care.

## 2020-11-03 ENCOUNTER — HOSPITAL ENCOUNTER (OUTPATIENT)
Dept: LAB | Facility: MEDICAL CENTER | Age: 65
End: 2020-11-03
Attending: FAMILY MEDICINE
Payer: MEDICARE

## 2020-11-03 ENCOUNTER — TELEPHONE (OUTPATIENT)
Dept: VASCULAR LAB | Facility: MEDICAL CENTER | Age: 65
End: 2020-11-03

## 2020-11-03 DIAGNOSIS — Z79.4 TYPE 2 DIABETES MELLITUS WITH STAGE 3A CHRONIC KIDNEY DISEASE, WITH LONG-TERM CURRENT USE OF INSULIN (HCC): ICD-10-CM

## 2020-11-03 DIAGNOSIS — E78.5 DYSLIPIDEMIA: ICD-10-CM

## 2020-11-03 DIAGNOSIS — N18.31 TYPE 2 DIABETES MELLITUS WITH STAGE 3A CHRONIC KIDNEY DISEASE, WITH LONG-TERM CURRENT USE OF INSULIN (HCC): ICD-10-CM

## 2020-11-03 DIAGNOSIS — E11.22 TYPE 2 DIABETES MELLITUS WITH STAGE 3A CHRONIC KIDNEY DISEASE, WITH LONG-TERM CURRENT USE OF INSULIN (HCC): ICD-10-CM

## 2020-11-03 DIAGNOSIS — I10 ESSENTIAL HYPERTENSION: ICD-10-CM

## 2020-11-03 LAB
ALBUMIN SERPL BCP-MCNC: 4.4 G/DL (ref 3.2–4.9)
ALBUMIN/GLOB SERPL: 1.3 G/DL
ALP SERPL-CCNC: 52 U/L (ref 30–99)
ALT SERPL-CCNC: 29 U/L (ref 2–50)
ANION GAP SERPL CALC-SCNC: 12 MMOL/L (ref 7–16)
AST SERPL-CCNC: 18 U/L (ref 12–45)
BILIRUB SERPL-MCNC: 0.5 MG/DL (ref 0.1–1.5)
BUN SERPL-MCNC: 58 MG/DL (ref 8–22)
CALCIUM SERPL-MCNC: 9.9 MG/DL (ref 8.5–10.5)
CHLORIDE SERPL-SCNC: 100 MMOL/L (ref 96–112)
CHOLEST SERPL-MCNC: 155 MG/DL (ref 100–199)
CO2 SERPL-SCNC: 18 MMOL/L (ref 20–33)
CREAT SERPL-MCNC: 2.6 MG/DL (ref 0.5–1.4)
CREAT UR-MCNC: 109.34 MG/DL
ERYTHROCYTE [DISTWIDTH] IN BLOOD BY AUTOMATED COUNT: 51.3 FL (ref 35.9–50)
EST. AVERAGE GLUCOSE BLD GHB EST-MCNC: 203 MG/DL
FASTING STATUS PATIENT QL REPORTED: NORMAL
FASTING STATUS PATIENT QL REPORTED: NORMAL
GLOBULIN SER CALC-MCNC: 3.3 G/DL (ref 1.9–3.5)
GLUCOSE SERPL-MCNC: 172 MG/DL (ref 65–99)
HBA1C MFR BLD: 8.7 % (ref 0–5.6)
HCT VFR BLD AUTO: 42.3 % (ref 37–47)
HDLC SERPL-MCNC: 41 MG/DL
HGB BLD-MCNC: 13.1 G/DL (ref 12–16)
LDLC SERPL CALC-MCNC: 70 MG/DL
MCH RBC QN AUTO: 29 PG (ref 27–33)
MCHC RBC AUTO-ENTMCNC: 31 G/DL (ref 33.6–35)
MCV RBC AUTO: 93.8 FL (ref 81.4–97.8)
MICROALBUMIN UR-MCNC: 10.7 MG/DL
MICROALBUMIN/CREAT UR: 98 MG/G (ref 0–30)
PLATELET # BLD AUTO: 292 K/UL (ref 164–446)
PMV BLD AUTO: 10.9 FL (ref 9–12.9)
POTASSIUM SERPL-SCNC: 5.7 MMOL/L (ref 3.6–5.5)
PROT SERPL-MCNC: 7.7 G/DL (ref 6–8.2)
RBC # BLD AUTO: 4.51 M/UL (ref 4.2–5.4)
SODIUM SERPL-SCNC: 130 MMOL/L (ref 135–145)
TRIGL SERPL-MCNC: 218 MG/DL (ref 0–149)
WBC # BLD AUTO: 6.7 K/UL (ref 4.8–10.8)

## 2020-11-03 PROCEDURE — 82043 UR ALBUMIN QUANTITATIVE: CPT

## 2020-11-03 PROCEDURE — 85027 COMPLETE CBC AUTOMATED: CPT

## 2020-11-03 PROCEDURE — 83721 ASSAY OF BLOOD LIPOPROTEIN: CPT | Mod: XU

## 2020-11-03 PROCEDURE — 36415 COLL VENOUS BLD VENIPUNCTURE: CPT

## 2020-11-03 PROCEDURE — 80053 COMPREHEN METABOLIC PANEL: CPT

## 2020-11-03 PROCEDURE — 82570 ASSAY OF URINE CREATININE: CPT

## 2020-11-03 PROCEDURE — 83036 HEMOGLOBIN GLYCOSYLATED A1C: CPT

## 2020-11-03 PROCEDURE — 80061 LIPID PANEL: CPT

## 2020-11-03 PROCEDURE — 83695 ASSAY OF LIPOPROTEIN(A): CPT

## 2020-11-04 LAB — LDLC SERPL-MCNC: 80 MG/DL (ref 0–129)

## 2020-11-05 ENCOUNTER — OFFICE VISIT (OUTPATIENT)
Dept: URGENT CARE | Facility: PHYSICIAN GROUP | Age: 65
End: 2020-11-05
Payer: MEDICARE

## 2020-11-05 ENCOUNTER — APPOINTMENT (OUTPATIENT)
Dept: RADIOLOGY | Facility: MEDICAL CENTER | Age: 65
End: 2020-11-05
Attending: EMERGENCY MEDICINE
Payer: MEDICARE

## 2020-11-05 ENCOUNTER — HOSPITAL ENCOUNTER (OUTPATIENT)
Facility: MEDICAL CENTER | Age: 65
End: 2020-11-08
Attending: EMERGENCY MEDICINE | Admitting: RADIOLOGY
Payer: MEDICARE

## 2020-11-05 VITALS
BODY MASS INDEX: 33.43 KG/M2 | RESPIRATION RATE: 18 BRPM | DIASTOLIC BLOOD PRESSURE: 82 MMHG | SYSTOLIC BLOOD PRESSURE: 124 MMHG | HEART RATE: 84 BPM | WEIGHT: 213 LBS | HEIGHT: 67 IN | TEMPERATURE: 97.8 F | OXYGEN SATURATION: 98 %

## 2020-11-05 DIAGNOSIS — R53.83 FATIGUE, UNSPECIFIED TYPE: ICD-10-CM

## 2020-11-05 DIAGNOSIS — N17.9 AKI (ACUTE KIDNEY INJURY) (HCC): ICD-10-CM

## 2020-11-05 DIAGNOSIS — N18.9 ACUTE KIDNEY INJURY SUPERIMPOSED ON CHRONIC KIDNEY DISEASE (HCC): ICD-10-CM

## 2020-11-05 DIAGNOSIS — I10 ESSENTIAL HYPERTENSION: ICD-10-CM

## 2020-11-05 DIAGNOSIS — N17.9 ACUTE KIDNEY INJURY SUPERIMPOSED ON CHRONIC KIDNEY DISEASE (HCC): ICD-10-CM

## 2020-11-05 DIAGNOSIS — E87.8 ELECTROLYTE ABNORMALITY: ICD-10-CM

## 2020-11-05 PROBLEM — E87.20 METABOLIC ACIDOSIS: Status: ACTIVE | Noted: 2020-11-05

## 2020-11-05 PROBLEM — R55 PRE-SYNCOPE: Status: ACTIVE | Noted: 2020-11-05

## 2020-11-05 LAB
ALBUMIN SERPL BCP-MCNC: 4.9 G/DL (ref 3.2–4.9)
ALBUMIN/GLOB SERPL: 1.4 G/DL
ALP SERPL-CCNC: 56 U/L (ref 30–99)
ALT SERPL-CCNC: 33 U/L (ref 2–50)
ANION GAP SERPL CALC-SCNC: 18 MMOL/L (ref 7–16)
AST SERPL-CCNC: 22 U/L (ref 12–45)
BASOPHILS # BLD AUTO: 0.8 % (ref 0–1.8)
BASOPHILS # BLD: 0.06 K/UL (ref 0–0.12)
BILIRUB SERPL-MCNC: 0.4 MG/DL (ref 0.1–1.5)
BUN SERPL-MCNC: 70 MG/DL (ref 8–22)
CALCIUM SERPL-MCNC: 10.4 MG/DL (ref 8.5–10.5)
CHLORIDE SERPL-SCNC: 104 MMOL/L (ref 96–112)
CO2 SERPL-SCNC: 15 MMOL/L (ref 20–33)
COVID ORDER STATUS COVID19: NORMAL
CREAT SERPL-MCNC: 3.55 MG/DL (ref 0.5–1.4)
EKG IMPRESSION: NORMAL
EOSINOPHIL # BLD AUTO: 0.11 K/UL (ref 0–0.51)
EOSINOPHIL NFR BLD: 1.4 % (ref 0–6.9)
ERYTHROCYTE [DISTWIDTH] IN BLOOD BY AUTOMATED COUNT: 50.8 FL (ref 35.9–50)
GLOBULIN SER CALC-MCNC: 3.4 G/DL (ref 1.9–3.5)
GLUCOSE BLD-MCNC: 157 MG/DL (ref 65–99)
GLUCOSE BLD-MCNC: 186 MG/DL (ref 70–100)
GLUCOSE SERPL-MCNC: 149 MG/DL (ref 65–99)
HCT VFR BLD AUTO: 42.8 % (ref 37–47)
HGB BLD-MCNC: 13.6 G/DL (ref 12–16)
IMM GRANULOCYTES # BLD AUTO: 0.02 K/UL (ref 0–0.11)
IMM GRANULOCYTES NFR BLD AUTO: 0.3 % (ref 0–0.9)
LPA SERPL-MCNC: 8 MG/DL
LYMPHOCYTES # BLD AUTO: 2.98 K/UL (ref 1–4.8)
LYMPHOCYTES NFR BLD: 37.7 % (ref 22–41)
MCH RBC QN AUTO: 29.4 PG (ref 27–33)
MCHC RBC AUTO-ENTMCNC: 31.8 G/DL (ref 33.6–35)
MCV RBC AUTO: 92.4 FL (ref 81.4–97.8)
MONOCYTES # BLD AUTO: 0.63 K/UL (ref 0–0.85)
MONOCYTES NFR BLD AUTO: 8 % (ref 0–13.4)
NEUTROPHILS # BLD AUTO: 4.11 K/UL (ref 2–7.15)
NEUTROPHILS NFR BLD: 51.8 % (ref 44–72)
NRBC # BLD AUTO: 0 K/UL
NRBC BLD-RTO: 0 /100 WBC
NT-PROBNP SERPL IA-MCNC: 95 PG/ML (ref 0–125)
PLATELET # BLD AUTO: 338 K/UL (ref 164–446)
PMV BLD AUTO: 10.4 FL (ref 9–12.9)
POTASSIUM SERPL-SCNC: 5.5 MMOL/L (ref 3.6–5.5)
PROT SERPL-MCNC: 8.3 G/DL (ref 6–8.2)
RBC # BLD AUTO: 4.63 M/UL (ref 4.2–5.4)
SARS-COV-2 RNA RESP QL NAA+PROBE: NOTDETECTED
SODIUM SERPL-SCNC: 137 MMOL/L (ref 135–145)
SPECIMEN SOURCE: NORMAL
WBC # BLD AUTO: 7.9 K/UL (ref 4.8–10.8)

## 2020-11-05 PROCEDURE — 93005 ELECTROCARDIOGRAM TRACING: CPT | Performed by: EMERGENCY MEDICINE

## 2020-11-05 PROCEDURE — G0378 HOSPITAL OBSERVATION PER HR: HCPCS

## 2020-11-05 PROCEDURE — 700102 HCHG RX REV CODE 250 W/ 637 OVERRIDE(OP): Performed by: HOSPITALIST

## 2020-11-05 PROCEDURE — 96372 THER/PROPH/DIAG INJ SC/IM: CPT

## 2020-11-05 PROCEDURE — 76775 US EXAM ABDO BACK WALL LIM: CPT

## 2020-11-05 PROCEDURE — 85025 COMPLETE CBC W/AUTO DIFF WBC: CPT

## 2020-11-05 PROCEDURE — 99220 PR INITIAL OBSERVATION CARE,LEVL III: CPT | Performed by: HOSPITALIST

## 2020-11-05 PROCEDURE — 700111 HCHG RX REV CODE 636 W/ 250 OVERRIDE (IP): Performed by: EMERGENCY MEDICINE

## 2020-11-05 PROCEDURE — 80053 COMPREHEN METABOLIC PANEL: CPT

## 2020-11-05 PROCEDURE — 99285 EMERGENCY DEPT VISIT HI MDM: CPT

## 2020-11-05 PROCEDURE — 82962 GLUCOSE BLOOD TEST: CPT | Performed by: FAMILY MEDICINE

## 2020-11-05 PROCEDURE — 82962 GLUCOSE BLOOD TEST: CPT

## 2020-11-05 PROCEDURE — 83880 ASSAY OF NATRIURETIC PEPTIDE: CPT

## 2020-11-05 PROCEDURE — 700111 HCHG RX REV CODE 636 W/ 250 OVERRIDE (IP)

## 2020-11-05 PROCEDURE — 96375 TX/PRO/DX INJ NEW DRUG ADDON: CPT

## 2020-11-05 PROCEDURE — A9270 NON-COVERED ITEM OR SERVICE: HCPCS | Performed by: HOSPITALIST

## 2020-11-05 PROCEDURE — 99214 OFFICE O/P EST MOD 30 MIN: CPT | Performed by: FAMILY MEDICINE

## 2020-11-05 PROCEDURE — 700101 HCHG RX REV CODE 250: Performed by: HOSPITALIST

## 2020-11-05 PROCEDURE — U0003 INFECTIOUS AGENT DETECTION BY NUCLEIC ACID (DNA OR RNA); SEVERE ACUTE RESPIRATORY SYNDROME CORONAVIRUS 2 (SARS-COV-2) (CORONAVIRUS DISEASE [COVID-19]), AMPLIFIED PROBE TECHNIQUE, MAKING USE OF HIGH THROUGHPUT TECHNOLOGIES AS DESCRIBED BY CMS-2020-01-R: HCPCS

## 2020-11-05 PROCEDURE — 36415 COLL VENOUS BLD VENIPUNCTURE: CPT

## 2020-11-05 PROCEDURE — C9803 HOPD COVID-19 SPEC COLLECT: HCPCS | Performed by: INTERNAL MEDICINE

## 2020-11-05 RX ORDER — ACETAMINOPHEN 325 MG/1
650 TABLET ORAL EVERY 6 HOURS PRN
Status: DISCONTINUED | OUTPATIENT
Start: 2020-11-05 | End: 2020-11-08 | Stop reason: HOSPADM

## 2020-11-05 RX ORDER — ACETAMINOPHEN 325 MG/1
TABLET ORAL
Status: DISPENSED
Start: 2020-11-05 | End: 2020-11-06

## 2020-11-05 RX ORDER — DEXTROSE MONOHYDRATE 25 G/50ML
50 INJECTION, SOLUTION INTRAVENOUS
Status: DISCONTINUED | OUTPATIENT
Start: 2020-11-05 | End: 2020-11-08 | Stop reason: HOSPADM

## 2020-11-05 RX ORDER — ONDANSETRON 4 MG/1
4 TABLET, ORALLY DISINTEGRATING ORAL EVERY 4 HOURS PRN
Status: DISCONTINUED | OUTPATIENT
Start: 2020-11-05 | End: 2020-11-08 | Stop reason: HOSPADM

## 2020-11-05 RX ORDER — HEPARIN SODIUM 5000 [USP'U]/ML
INJECTION, SOLUTION INTRAVENOUS; SUBCUTANEOUS
Status: COMPLETED
Start: 2020-11-05 | End: 2020-11-05

## 2020-11-05 RX ORDER — HEPARIN SODIUM 5000 [USP'U]/ML
5000 INJECTION, SOLUTION INTRAVENOUS; SUBCUTANEOUS EVERY 8 HOURS
Status: DISCONTINUED | OUTPATIENT
Start: 2020-11-05 | End: 2020-11-08 | Stop reason: HOSPADM

## 2020-11-05 RX ORDER — AMOXICILLIN 250 MG
2 CAPSULE ORAL 2 TIMES DAILY
Status: DISCONTINUED | OUTPATIENT
Start: 2020-11-05 | End: 2020-11-08 | Stop reason: HOSPADM

## 2020-11-05 RX ORDER — POLYETHYLENE GLYCOL 3350 17 G/17G
1 POWDER, FOR SOLUTION ORAL
Status: DISCONTINUED | OUTPATIENT
Start: 2020-11-05 | End: 2020-11-08 | Stop reason: HOSPADM

## 2020-11-05 RX ORDER — ONDANSETRON 2 MG/ML
4 INJECTION INTRAMUSCULAR; INTRAVENOUS EVERY 4 HOURS PRN
Status: DISCONTINUED | OUTPATIENT
Start: 2020-11-05 | End: 2020-11-08 | Stop reason: HOSPADM

## 2020-11-05 RX ORDER — ATORVASTATIN CALCIUM 40 MG/1
40 TABLET, FILM COATED ORAL EVERY EVENING
Status: DISCONTINUED | OUTPATIENT
Start: 2020-11-05 | End: 2020-11-08 | Stop reason: HOSPADM

## 2020-11-05 RX ORDER — BISACODYL 10 MG
10 SUPPOSITORY, RECTAL RECTAL
Status: DISCONTINUED | OUTPATIENT
Start: 2020-11-05 | End: 2020-11-08 | Stop reason: HOSPADM

## 2020-11-05 RX ORDER — SODIUM BICARBONATE IN D5W 150/1000ML
PLASTIC BAG, INJECTION (ML) INTRAVENOUS CONTINUOUS
Status: DISCONTINUED | OUTPATIENT
Start: 2020-11-05 | End: 2020-11-07

## 2020-11-05 RX ORDER — LABETALOL HYDROCHLORIDE 5 MG/ML
10 INJECTION, SOLUTION INTRAVENOUS EVERY 4 HOURS PRN
Status: DISCONTINUED | OUTPATIENT
Start: 2020-11-05 | End: 2020-11-08 | Stop reason: HOSPADM

## 2020-11-05 RX ORDER — INSULIN GLARGINE 100 [IU]/ML
5 INJECTION, SOLUTION SUBCUTANEOUS EVERY EVENING
Status: DISCONTINUED | OUTPATIENT
Start: 2020-11-05 | End: 2020-11-08 | Stop reason: HOSPADM

## 2020-11-05 RX ORDER — ONDANSETRON 2 MG/ML
4 INJECTION INTRAMUSCULAR; INTRAVENOUS ONCE
Status: COMPLETED | OUTPATIENT
Start: 2020-11-05 | End: 2020-11-05

## 2020-11-05 RX ADMIN — HEPARIN SODIUM 5000 UNITS: 5000 INJECTION, SOLUTION INTRAVENOUS; SUBCUTANEOUS at 22:00

## 2020-11-05 RX ADMIN — ONDANSETRON 4 MG: 2 INJECTION INTRAMUSCULAR; INTRAVENOUS at 18:00

## 2020-11-05 RX ADMIN — DOCUSATE SODIUM 50 MG AND SENNOSIDES 8.6 MG 2 TABLET: 8.6; 5 TABLET, FILM COATED ORAL at 18:00

## 2020-11-05 RX ADMIN — HEPARIN SODIUM 5000 UNITS: 5000 INJECTION INTRAVENOUS; SUBCUTANEOUS at 22:00

## 2020-11-05 RX ADMIN — ACETAMINOPHEN 650 MG: 325 TABLET, FILM COATED ORAL at 21:05

## 2020-11-05 RX ADMIN — SODIUM BICARBONATE 50 ML/HR: 84 INJECTION, SOLUTION INTRAVENOUS at 20:15

## 2020-11-05 RX ADMIN — INSULIN HUMAN 1 UNITS: 100 INJECTION, SOLUTION PARENTERAL at 22:34

## 2020-11-05 RX ADMIN — ATORVASTATIN CALCIUM 40 MG: 40 TABLET, FILM COATED ORAL at 22:01

## 2020-11-05 RX ADMIN — INSULIN GLARGINE 5 UNITS: 100 INJECTION, SOLUTION SUBCUTANEOUS at 22:34

## 2020-11-05 SDOH — HEALTH STABILITY: MENTAL HEALTH: HOW OFTEN DO YOU HAVE 6 OR MORE DRINKS ON ONE OCCASION?: NEVER

## 2020-11-05 SDOH — HEALTH STABILITY: MENTAL HEALTH: HOW OFTEN DO YOU HAVE A DRINK CONTAINING ALCOHOL?: NEVER

## 2020-11-05 ASSESSMENT — PATIENT HEALTH QUESTIONNAIRE - PHQ9
1. LITTLE INTEREST OR PLEASURE IN DOING THINGS: SEVERAL DAYS
SUM OF ALL RESPONSES TO PHQ9 QUESTIONS 1 AND 2: 1
6. FEELING BAD ABOUT YOURSELF - OR THAT YOU ARE A FAILURE OR HAVE LET YOURSELF OR YOUR FAMILY DOWN: NOT AL ALL
SUM OF ALL RESPONSES TO PHQ QUESTIONS 1-9: 4
4. FEELING TIRED OR HAVING LITTLE ENERGY: SEVERAL DAYS
8. MOVING OR SPEAKING SO SLOWLY THAT OTHER PEOPLE COULD HAVE NOTICED. OR THE OPPOSITE, BEING SO FIGETY OR RESTLESS THAT YOU HAVE BEEN MOVING AROUND A LOT MORE THAN USUAL: SEVERAL DAYS
9. THOUGHTS THAT YOU WOULD BE BETTER OFF DEAD, OR OF HURTING YOURSELF: NOT AT ALL
3. TROUBLE FALLING OR STAYING ASLEEP OR SLEEPING TOO MUCH: NOT AT ALL
5. POOR APPETITE OR OVEREATING: SEVERAL DAYS
7. TROUBLE CONCENTRATING ON THINGS, SUCH AS READING THE NEWSPAPER OR WATCHING TELEVISION: NOT AT ALL
2. FEELING DOWN, DEPRESSED, IRRITABLE, OR HOPELESS: NOT AT ALL

## 2020-11-05 ASSESSMENT — ENCOUNTER SYMPTOMS
ABDOMINAL PAIN: 0
VOMITING: 0
VOMITING: 0
DIZZINESS: 1
FEVER: 0
WEAKNESS: 1
BACK PAIN: 1
LOSS OF CONSCIOUSNESS: 0
CHILLS: 1
PALPITATIONS: 0
NAUSEA: 0
FALLS: 0
PSYCHIATRIC NEGATIVE: 1
SHORTNESS OF BREATH: 0
NERVOUS/ANXIOUS: 0
WEIGHT LOSS: 0
NAUSEA: 0
COUGH: 0
MYALGIAS: 1
FLANK PAIN: 0
FOCAL WEAKNESS: 0
HEADACHES: 0
MYALGIAS: 0
EYE REDNESS: 0
EYE DISCHARGE: 0
SPEECH CHANGE: 0

## 2020-11-05 ASSESSMENT — LIFESTYLE VARIABLES
EVER FELT BAD OR GUILTY ABOUT YOUR DRINKING: NO
HAVE PEOPLE ANNOYED YOU BY CRITICIZING YOUR DRINKING: NO
ALCOHOL_USE: NO
ON A TYPICAL DAY WHEN YOU DRINK ALCOHOL HOW MANY DRINKS DO YOU HAVE: 0
TOTAL SCORE: 0
TOTAL SCORE: 0
DOES PATIENT WANT TO STOP DRINKING: NO
HOW MANY TIMES IN THE PAST YEAR HAVE YOU HAD 5 OR MORE DRINKS IN A DAY: 0
HAVE YOU EVER FELT YOU SHOULD CUT DOWN ON YOUR DRINKING: NO
TOTAL SCORE: 0
CONSUMPTION TOTAL: NEGATIVE
AVERAGE NUMBER OF DAYS PER WEEK YOU HAVE A DRINK CONTAINING ALCOHOL: 0
EVER HAD A DRINK FIRST THING IN THE MORNING TO STEADY YOUR NERVES TO GET RID OF A HANGOVER: NO

## 2020-11-05 ASSESSMENT — FIBROSIS 4 INDEX
FIB4 SCORE: 0.76
FIB4 SCORE: 0.76

## 2020-11-05 ASSESSMENT — PAIN DESCRIPTION - PAIN TYPE
TYPE: ACUTE PAIN
TYPE: ACUTE PAIN

## 2020-11-05 NOTE — ED NOTES
Blood drawn and sent to lab.  Patient unable to give urine sample, cup given to patient for the future.  Apologized to patient for the long wait and thanked her for her patience.

## 2020-11-05 NOTE — LETTER
November 5, 2020         Patient: Brenda Davidson   YOB: 1955   Date of Visit: 11/5/2020           To Whom it May Concern:    Brenda Davidson was seen in my clinic on 11/5/2020. Please excuse 11/5 and 11/6/2020.     Sincerely,           Abdirashid Salas M.D.  Electronically Signed

## 2020-11-05 NOTE — PROGRESS NOTES
"Subjective:      Brenda Davidson is a 65 y.o. female who presents with Dizziness (dizzy and nausea, since she was in the hospital. her meds were changed she thinks it has to do with that )            1 month orthostatic lightheadedness and fatigue. Nausea without emesis. Myalgia.  Patient was hospitalized 1 month ago due to hypertensive urgency. She has been placed on multiple new medications and is concerned that they may be contributing to symptoms. Statin is not new. No vertigo. No unilateral weakness.  No chest pain or palpitations.  Intermittent HA. No other aggravating or alleviating factors.        Review of Systems   Constitutional: Negative for malaise/fatigue and weight loss.   Eyes: Negative for discharge and redness.   Gastrointestinal: Negative for nausea and vomiting.   Musculoskeletal: Negative for joint pain and myalgias.   Skin: Negative for itching and rash.     .  Medications, Allergies, and current problem list reviewed today in Epic       Objective:     /82 (BP Location: Right arm, Patient Position: Sitting, BP Cuff Size: Adult)   Pulse 84   Temp 36.6 °C (97.8 °F) (Temporal)   Resp 18   Ht 1.702 m (5' 7\")   Wt 96.6 kg (213 lb)   SpO2 98%   BMI 33.36 kg/m²      Physical Exam  Constitutional:       General: She is not in acute distress.     Appearance: She is well-developed.   HENT:      Head: Normocephalic and atraumatic.   Eyes:      Conjunctiva/sclera: Conjunctivae normal.   Cardiovascular:      Rate and Rhythm: Normal rate and regular rhythm.      Heart sounds: Normal heart sounds. No murmur.   Pulmonary:      Effort: Pulmonary effort is normal.      Breath sounds: Normal breath sounds. No wheezing.   Skin:     General: Skin is warm and dry.      Findings: No rash.   Neurological:      Mental Status: She is alert and oriented to person, place, and time.                 Assessment/Plan:      poct glucose: 186    Labs from 11/3/2020 reviewed noting hyponatremia at 130, " hyperkalemia at 5.7, and a significant worsening of kidney function with creatinine of 2.60.  Creatinine 9/4/2020 was 1.17.    1. TIMMY (acute kidney injury) (HCC)     2. Electrolyte abnormality     3. Fatigue, unspecified type  POCT glucose     Differential diagnosis, natural history, supportive care, and indications for immediate follow-up discussed at length.     Recommended to ED for further evaluation and treatment.  Discussed private vehicle versus EMS.  She states that she will have her niece drive her there. I think this is reasonable.

## 2020-11-05 NOTE — ED TRIAGE NOTES
"Chief Complaint   Patient presents with   • Sent by MD     66 yo female ambulatory to triage for above complaint. Pt had lab work done for increasing fatigue x 2 wks, found to have K of 5.7 and worsening kidney function. Denies chest pain or SOB, AOx4, GCS 15. Charge RN notified.    Educated on triage process, encourage to inform staff of any changes.     /69   Pulse 84   Temp 36.6 °C (97.9 °F) (Temporal)   Resp 14   Ht 1.702 m (5' 7\")   Wt 93.2 kg (205 lb 7.5 oz)   SpO2 95%   BMI 32.18 kg/m²   "

## 2020-11-06 PROBLEM — N39.0 UTI (URINARY TRACT INFECTION): Status: ACTIVE | Noted: 2020-11-06

## 2020-11-06 LAB
ANION GAP SERPL CALC-SCNC: 15 MMOL/L (ref 7–16)
ANION GAP SERPL CALC-SCNC: 18 MMOL/L (ref 7–16)
APPEARANCE UR: ABNORMAL
BACTERIA #/AREA URNS HPF: ABNORMAL /HPF
BILIRUB UR QL STRIP.AUTO: NEGATIVE
BUN SERPL-MCNC: 68 MG/DL (ref 8–22)
BUN SERPL-MCNC: 73 MG/DL (ref 8–22)
CALCIUM SERPL-MCNC: 8.7 MG/DL (ref 8.5–10.5)
CALCIUM SERPL-MCNC: 9.5 MG/DL (ref 8.5–10.5)
CHLORIDE SERPL-SCNC: 101 MMOL/L (ref 96–112)
CHLORIDE SERPL-SCNC: 98 MMOL/L (ref 96–112)
CHLORIDE UR-SCNC: 51 MMOL/L
CO2 SERPL-SCNC: 16 MMOL/L (ref 20–33)
CO2 SERPL-SCNC: 21 MMOL/L (ref 20–33)
COLOR UR: YELLOW
CREAT SERPL-MCNC: 2.86 MG/DL (ref 0.5–1.4)
CREAT SERPL-MCNC: 4.12 MG/DL (ref 0.5–1.4)
CREAT UR-MCNC: 152.57 MG/DL
EPI CELLS #/AREA URNS HPF: ABNORMAL /HPF
GLUCOSE BLD-MCNC: 169 MG/DL (ref 65–99)
GLUCOSE BLD-MCNC: 170 MG/DL (ref 65–99)
GLUCOSE BLD-MCNC: 203 MG/DL (ref 65–99)
GLUCOSE BLD-MCNC: 210 MG/DL (ref 65–99)
GLUCOSE SERPL-MCNC: 195 MG/DL (ref 65–99)
GLUCOSE SERPL-MCNC: 219 MG/DL (ref 65–99)
GLUCOSE UR STRIP.AUTO-MCNC: >=1000 MG/DL
HYALINE CASTS #/AREA URNS LPF: ABNORMAL /LPF
KETONES UR STRIP.AUTO-MCNC: NEGATIVE MG/DL
LEUKOCYTE ESTERASE UR QL STRIP.AUTO: ABNORMAL
MICRO URNS: ABNORMAL
NITRITE UR QL STRIP.AUTO: NEGATIVE
PH UR STRIP.AUTO: 5 [PH] (ref 5–8)
POTASSIUM SERPL-SCNC: 4.5 MMOL/L (ref 3.6–5.5)
POTASSIUM SERPL-SCNC: 5 MMOL/L (ref 3.6–5.5)
POTASSIUM UR-SCNC: 38 MMOL/L
PROT UR QL STRIP: 100 MG/DL
PROT UR-MCNC: 89 MG/DL (ref 0–15)
RBC # URNS HPF: ABNORMAL /HPF
RBC UR QL AUTO: ABNORMAL
SODIUM SERPL-SCNC: 134 MMOL/L (ref 135–145)
SODIUM SERPL-SCNC: 135 MMOL/L (ref 135–145)
SODIUM UR-SCNC: 49 MMOL/L
SP GR UR STRIP.AUTO: 1.02
UROBILINOGEN UR STRIP.AUTO-MCNC: 0.2 MG/DL
WBC #/AREA URNS HPF: ABNORMAL /HPF

## 2020-11-06 PROCEDURE — 96376 TX/PRO/DX INJ SAME DRUG ADON: CPT

## 2020-11-06 PROCEDURE — 84156 ASSAY OF PROTEIN URINE: CPT

## 2020-11-06 PROCEDURE — 87077 CULTURE AEROBIC IDENTIFY: CPT

## 2020-11-06 PROCEDURE — 84300 ASSAY OF URINE SODIUM: CPT

## 2020-11-06 PROCEDURE — 80048 BASIC METABOLIC PNL TOTAL CA: CPT

## 2020-11-06 PROCEDURE — 84133 ASSAY OF URINE POTASSIUM: CPT

## 2020-11-06 PROCEDURE — 99225 PR SUBSEQUENT OBSERVATION CARE,LEVEL II: CPT | Performed by: HOSPITALIST

## 2020-11-06 PROCEDURE — 99205 OFFICE O/P NEW HI 60 MIN: CPT | Performed by: INTERNAL MEDICINE

## 2020-11-06 PROCEDURE — 700111 HCHG RX REV CODE 636 W/ 250 OVERRIDE (IP): Performed by: HOSPITALIST

## 2020-11-06 PROCEDURE — 82436 ASSAY OF URINE CHLORIDE: CPT

## 2020-11-06 PROCEDURE — 700102 HCHG RX REV CODE 250 W/ 637 OVERRIDE(OP)

## 2020-11-06 PROCEDURE — A9270 NON-COVERED ITEM OR SERVICE: HCPCS

## 2020-11-06 PROCEDURE — 96365 THER/PROPH/DIAG IV INF INIT: CPT

## 2020-11-06 PROCEDURE — 82570 ASSAY OF URINE CREATININE: CPT

## 2020-11-06 PROCEDURE — 700102 HCHG RX REV CODE 250 W/ 637 OVERRIDE(OP): Performed by: HOSPITALIST

## 2020-11-06 PROCEDURE — 700105 HCHG RX REV CODE 258: Performed by: HOSPITALIST

## 2020-11-06 PROCEDURE — 81001 URINALYSIS AUTO W/SCOPE: CPT

## 2020-11-06 PROCEDURE — 82962 GLUCOSE BLOOD TEST: CPT | Mod: 91

## 2020-11-06 PROCEDURE — 700111 HCHG RX REV CODE 636 W/ 250 OVERRIDE (IP)

## 2020-11-06 PROCEDURE — 36415 COLL VENOUS BLD VENIPUNCTURE: CPT

## 2020-11-06 PROCEDURE — 87186 SC STD MICRODIL/AGAR DIL: CPT

## 2020-11-06 PROCEDURE — A9270 NON-COVERED ITEM OR SERVICE: HCPCS | Performed by: HOSPITALIST

## 2020-11-06 PROCEDURE — G0378 HOSPITAL OBSERVATION PER HR: HCPCS

## 2020-11-06 PROCEDURE — 87086 URINE CULTURE/COLONY COUNT: CPT

## 2020-11-06 PROCEDURE — 96372 THER/PROPH/DIAG INJ SC/IM: CPT | Mod: XU

## 2020-11-06 RX ORDER — HEPARIN SODIUM 5000 [USP'U]/ML
INJECTION, SOLUTION INTRAVENOUS; SUBCUTANEOUS
Status: COMPLETED
Start: 2020-11-06 | End: 2020-11-06

## 2020-11-06 RX ORDER — HEPARIN SODIUM 5000 [USP'U]/ML
INJECTION, SOLUTION INTRAVENOUS; SUBCUTANEOUS
Status: DISPENSED
Start: 2020-11-06 | End: 2020-11-07

## 2020-11-06 RX ORDER — ONDANSETRON 2 MG/ML
INJECTION INTRAMUSCULAR; INTRAVENOUS
Status: DISPENSED
Start: 2020-11-06 | End: 2020-11-07

## 2020-11-06 RX ORDER — SENNOSIDES A AND B 8.6 MG/1
TABLET, FILM COATED ORAL
Status: COMPLETED
Start: 2020-11-06 | End: 2020-11-06

## 2020-11-06 RX ADMIN — INSULIN HUMAN 1 UNITS: 100 INJECTION, SOLUTION PARENTERAL at 17:16

## 2020-11-06 RX ADMIN — HEPARIN SODIUM 5000 UNITS: 5000 INJECTION INTRAVENOUS; SUBCUTANEOUS at 20:51

## 2020-11-06 RX ADMIN — ONDANSETRON 4 MG: 2 INJECTION INTRAMUSCULAR; INTRAVENOUS at 15:58

## 2020-11-06 RX ADMIN — INSULIN HUMAN 2 UNITS: 100 INJECTION, SOLUTION PARENTERAL at 11:53

## 2020-11-06 RX ADMIN — CEFTRIAXONE SODIUM 2 G: 2 INJECTION, POWDER, FOR SOLUTION INTRAMUSCULAR; INTRAVENOUS at 12:09

## 2020-11-06 RX ADMIN — INSULIN HUMAN 1 UNITS: 100 INJECTION, SOLUTION PARENTERAL at 08:00

## 2020-11-06 RX ADMIN — HEPARIN SODIUM 5000 UNITS: 5000 INJECTION INTRAVENOUS; SUBCUTANEOUS at 14:41

## 2020-11-06 RX ADMIN — ASPIRIN 81 MG: 81 TABLET, COATED ORAL at 05:25

## 2020-11-06 RX ADMIN — INSULIN HUMAN 2 UNITS: 100 INJECTION, SOLUTION PARENTERAL at 20:52

## 2020-11-06 RX ADMIN — SENNOSIDES 8.6 MG: 8.6 TABLET, FILM COATED ORAL at 17:24

## 2020-11-06 RX ADMIN — ATORVASTATIN CALCIUM 40 MG: 40 TABLET, FILM COATED ORAL at 18:14

## 2020-11-06 RX ADMIN — HEPARIN SODIUM 5000 UNITS: 5000 INJECTION, SOLUTION INTRAVENOUS; SUBCUTANEOUS at 05:24

## 2020-11-06 RX ADMIN — INSULIN GLARGINE 5 UNITS: 100 INJECTION, SOLUTION SUBCUTANEOUS at 17:19

## 2020-11-06 RX ADMIN — HEPARIN SODIUM 5000 UNITS: 5000 INJECTION INTRAVENOUS; SUBCUTANEOUS at 05:24

## 2020-11-06 ASSESSMENT — ENCOUNTER SYMPTOMS
DIZZINESS: 0
DEPRESSION: 0
HALLUCINATIONS: 0
FOCAL WEAKNESS: 0
FLANK PAIN: 0
SHORTNESS OF BREATH: 0
FEVER: 0
COUGH: 0
ABDOMINAL PAIN: 0
NAUSEA: 0
CHILLS: 0
SPEECH CHANGE: 0
HEMOPTYSIS: 0
HEARTBURN: 0
EYE DISCHARGE: 0
BRUISES/BLEEDS EASILY: 0
VOMITING: 0
PALPITATIONS: 0
SENSORY CHANGE: 0
MYALGIAS: 0
WEAKNESS: 0
BLURRED VISION: 0
DOUBLE VISION: 0

## 2020-11-06 ASSESSMENT — LIFESTYLE VARIABLES: SUBSTANCE_ABUSE: 0

## 2020-11-06 NOTE — PROGRESS NOTES
[facility] TRIAGE OFFICER ER REPORT  Consult/Admission requested by: Dr Umanzor  Chief complaint: Fatigue  Pertinent history/ER Course: 65-year-old female with history of diabetes, was started on hydrochlorothiazide and hydralazine 1 month ago for hypertensive emergency, now presented with complaints of nausea, lightheadedness, fatigue and worsening of kidney function.  Emergency physician consulted with Dr. Ventura, who will consult . recommended to hold blood pressure medications.  If patient needs IV fluids, recommended bicarb drip  Code Status: Full per ERP, I personally verified with the ERP patient's code status and the ERP has confirmed this with the patient.   Patient meets admission criterion: Yes..  Recommendations given or work up & consultations requested per triage officer: Per nephrology  Consultants involved and pertinent input from consultants: Nephrology  Admission status: Observation.   Admission order placed: Yes.   Floor requested: med  [facility] hospitalist: Dr Saldaña

## 2020-11-06 NOTE — ED PROVIDER NOTES
ED Provider Note    Scribed for Sergo Umanzor M.D. by Amos Askew. 11/5/2020  4:45 PM    Primary care provider: FILI Kaminski  Means of arrival: Walk In  History obtained from: Patient  History limited by: None    CHIEF COMPLAINT  Chief Complaint   Patient presents with   • Sent by MD SANDS  Brenda Davidson is a 65 y.o. female who presents to the Emergency Department for evaluation of increasing fatigue onset 2 weeks ago.  Patient states she was hospitalized approximately 2 months ago, she was feeling well until about 2 weeks ago when the patient started having increasing lightheadedness, nausea, near syncope, and diarrhea.  She went to urgent care yesterday where they did laboratory tests and showed acute kidney injury with hyperkalemia, the patient was sent here today.  The patient still has some nausea.  No chest pains, no shortness of breath.      REVIEW OF SYSTEMS  As per HPI all other systems reviewed and negative.    PAST MEDICAL HISTORY   has a past medical history of Acute left-sided low back pain without sciatica (2/24/2020), Anesthesia, Chronic low back pain, Dental disorder, DM (diabetes mellitus) (HCC), Hospital discharge follow-up (9/23/2020), Hypercholesteremia, Hypertension, Mass of cecum (9/15), Obesity (BMI 30.0-34.9), and Other specified disorder of intestines.    SURGICAL HISTORY   has a past surgical history that includes other; other; pelviscopy (4/6/2012); lysis adhesions gyn (4/6/2012); colonoscopy with polyp (2012); arthroscopy, knee (3/14); colonoscopy with polyp (8/13/15); cholecystectomy (8/21/2015); appendectomy (2015); and bowel resection laparoscopic (8/21/2015).    SOCIAL HISTORY  Social History     Tobacco Use   • Smoking status: Never Smoker   • Smokeless tobacco: Never Used   Substance Use Topics   • Alcohol use: No     Alcohol/week: 0.0 oz   • Drug use: No      Social History     Substance and Sexual Activity   Drug Use No       FAMILY HISTORY  Family  "History   Problem Relation Age of Onset   • Cancer Mother         breast cancer   • Lung Disease Mother         emphysema   • Hypertension Father    • Alzheimer's Disease Sister    • Dementia Sister    • Hypertension Brother    • No Known Problems Sister    • No Known Problems Sister    • Hypertension Brother        CURRENT MEDICATIONS  Current Outpatient Medications   Medication Instructions   • Adult Aspirin Regimen 81 mg, Oral, DAILY   • amLODIPine (NORVASC) 5 mg, Oral, DAILY   • atorvastatin (LIPITOR) 40 MG Tab TAKE ONE TABLET BY MOUTH EVERY NIGHT AT BEDTIME   • Blood Glucose Test Strips Use one Claudette Contour Next strip to test blood sugar once daily .   • chlorthalidone (HYGROTON) 25 mg, Oral, DAILY   • Empagliflozin (JARDIANCE) 10 MG Tab 1 tablet, Oral, DAILY   • glucose blood strip 1 Strip by Other route every day. to test blood sugar   • hydrALAZINE (APRESOLINE) 75 mg, Oral, EVERY 8 HOURS   • Insulin Pen Needle 32 G x 4 mm Use one pen needle in pen device to inject insulin once daily.   • Lancets Use one Claudette Contour Next lancet to test blood sugar once daily .   • Lantus SoloStar 7 Units, Subcutaneous, EVERY EVENING   • metformin (GLUCOPHAGE) 1,000 mg, Oral, 2 TIMES DAILY WITH MEALS   • olmesartan (BENICAR) 40 mg, Oral, DAILY   • spironolactone (ALDACTONE) 50 mg, Oral, EVERY MORNING       ALLERGIES  Allergies   Allergen Reactions   • Ace Inhibitors Cough     cough   • Codeine Rash and Itching     Vicodin doesn't cause her any problems   • Metronidazole Vomiting and Nausea       PHYSICAL EXAM  VITAL SIGNS: /72   Pulse 79   Temp 36.6 °C (97.9 °F) (Temporal)   Resp 14   Ht 1.702 m (5' 7\")   Wt 93.2 kg (205 lb 7.5 oz)   SpO2 94%   BMI 32.18 kg/m²     Constitutional: Well developed, Well nourished, mild distress, Non-toxic appearance.   HENT: Normocephalic, Atraumatic, Bilateral external ears normal, Oropharynx moist, No oral exudates.   Eyes: PERRLA, EOMI, Conjunctiva normal, No discharge.   Neck: " No tenderness, Supple, No stridor.   Lymphatic: No lymphadenopathy noted.   Cardiovascular: Normal heart rate, Normal rhythm.   Thorax & Lungs: Clear to auscultation bilaterally, No respiratory distress, No wheezing, No crackles.   Abdomen: Soft, No tenderness, No masses, No pulsatile masses.   Skin: Warm, Dry, No erythema, No rash.   Extremities:, No edema No cyanosis.   Musculoskeletal: No tenderness to palpation or major deformities noted.  Intact distal pulses  Neurologic: Awake, alert. Moves all extremities spontaneously.  Psychiatric: Affect normal, Judgment normal, Mood normal.     LABS  Results for orders placed or performed during the hospital encounter of 11/05/20   CBC WITH DIFFERENTIAL   Result Value Ref Range    WBC 7.9 4.8 - 10.8 K/uL    RBC 4.63 4.20 - 5.40 M/uL    Hemoglobin 13.6 12.0 - 16.0 g/dL    Hematocrit 42.8 37.0 - 47.0 %    MCV 92.4 81.4 - 97.8 fL    MCH 29.4 27.0 - 33.0 pg    MCHC 31.8 (L) 33.6 - 35.0 g/dL    RDW 50.8 (H) 35.9 - 50.0 fL    Platelet Count 338 164 - 446 K/uL    MPV 10.4 9.0 - 12.9 fL    Neutrophils-Polys 51.80 44.00 - 72.00 %    Lymphocytes 37.70 22.00 - 41.00 %    Monocytes 8.00 0.00 - 13.40 %    Eosinophils 1.40 0.00 - 6.90 %    Basophils 0.80 0.00 - 1.80 %    Immature Granulocytes 0.30 0.00 - 0.90 %    Nucleated RBC 0.00 /100 WBC    Neutrophils (Absolute) 4.11 2.00 - 7.15 K/uL    Lymphs (Absolute) 2.98 1.00 - 4.80 K/uL    Monos (Absolute) 0.63 0.00 - 0.85 K/uL    Eos (Absolute) 0.11 0.00 - 0.51 K/uL    Baso (Absolute) 0.06 0.00 - 0.12 K/uL    Immature Granulocytes (abs) 0.02 0.00 - 0.11 K/uL    NRBC (Absolute) 0.00 K/uL   COMP METABOLIC PANEL   Result Value Ref Range    Sodium 137 135 - 145 mmol/L    Potassium 5.5 3.6 - 5.5 mmol/L    Chloride 104 96 - 112 mmol/L    Co2 15 (L) 20 - 33 mmol/L    Anion Gap 18.0 (H) 7.0 - 16.0    Glucose 149 (H) 65 - 99 mg/dL    Bun 70 (H) 8 - 22 mg/dL    Creatinine 3.55 (H) 0.50 - 1.40 mg/dL    Calcium 10.4 8.5 - 10.5 mg/dL    AST(SGOT) 22 12  - 45 U/L    ALT(SGPT) 33 2 - 50 U/L    Alkaline Phosphatase 56 30 - 99 U/L    Total Bilirubin 0.4 0.1 - 1.5 mg/dL    Albumin 4.9 3.2 - 4.9 g/dL    Total Protein 8.3 (H) 6.0 - 8.2 g/dL    Globulin 3.4 1.9 - 3.5 g/dL    A-G Ratio 1.4 g/dL   ESTIMATED GFR   Result Value Ref Range    GFR If  16 (A) >60 mL/min/1.73 m 2    GFR If Non  13 (A) >60 mL/min/1.73 m 2   proBrain Natriuretic Peptide, NT   Result Value Ref Range    NT-proBNP 95 0 - 125 pg/mL   Routine (COVID/SARS COV-2 In-House PCR up to 24 hours)    Specimen: Nasopharyngeal; Respirate   Result Value Ref Range    COVID Order Status Received    SARS-CoV-2, PCR (In-House)   Result Value Ref Range    SARS-CoV-2 Source NP Swab    EKG (NOW)   Result Value Ref Range    Report       [facility] Emergency Dept.    Test Date:  2020  Pt Name:    NISHA GORDILLO                Department: ER  MRN:        [id number]                      Room:  Gender:     Female                       Technician: [id number]  :        [id number]                   Requested By:DEWAYNE YE  Order #:    [id number]                    Reading MD: DEWAYNE YE MD    Measurements  Intervals                                Axis  Rate:       71                           P:          44  RI:         168                          QRS:        5  QRSD:       90                           T:          11  QT:         388  QTc:        422    Interpretive Statements  SINUS RHYTHM  BASELINE WANDER IN LEAD(S) V6  Compared to ECG 2020 14:25:21  Left ventricular hypertrophy no longer present  T-wave abnormality no longer present  Electronically Signed On 2020 17:10:15 PST by DEWAYNE YE MD          RADIOLOGY  US-RENAL   Final Result      Mild left caliectasis.      Renal parenchyma appears echogenic which can be seen in medical renal disease.              The radiologist's interpretation of all radiological studies have been reviewed by  me.      COURSE & MEDICAL DECISION MAKING  Pertinent Labs & Imaging studies reviewed. (See chart for details)    I reviewed the patient's medical records which showed she was admitted in September for hypertension. She was started on Spironolactone, Hydralazine, and HCTZ.        Decision Making:  Patient is coming in secondary to acute kidney injury, the patient is recently started on antihypertensive, discussed the case with Dr. Ventura will consult on the case, we will get a renal ultrasound, patient will need to be hospitalized, all of her antihypertensives stopped for now.      FINAL IMPRESSION  1. TIMMY (acute kidney injury) (HCC)          mAos BILL (Scribe), am scribing for, and in the presence of, Sergo Umanzor M.D..    Electronically signed by: Amos Askew (Scribe), 11/5/2020    ISergo M.D. personally performed the services described in this documentation, as scribed by Amos Askew in my presence, and it is both accurate and complete.    The note accurately reflects work and decisions made by me.  Sergo Umanzor M.D.  11/5/2020  7:34 PM

## 2020-11-06 NOTE — H&P
Hospital Medicine History & Physical Note    Date of Service  11/5/2020    Primary Care Physician  JEANNE Kaminski.    Consultants  Nephro- [place]    Code Status  Prior    Chief Complaint  Chief Complaint   Patient presents with   • Sent by MD       History of Presenting Illness  65 y.o. female who presented 11/5/2020 with worsening fatigue and dizziness that has been progressing over the last 2 weeks. She reports that many of her medications were changed when she was discharged from the hospital in September. New medications from this discharge included hydralazine, HCTZ, spironolactone which were added to her Diltazem and losartan. On med rec today, her BP medications include amlodipine, olmesartan, spironolactone, and chlorthalidone. Unclear when this most recent change was made.     She reports intermittent dizziness and pre-syncope with associated increasing weakness and fatigue. She has had poor appetite but endorses drinking a lot of water. Denies syncope or falls.    In the ED, she was hemodynamically stable and afebrile. Saturating well on RA. Nephrology was consulted by the ERP as bicarb was 15, BUN 70 and Cr 3.55.    Review of Systems  Review of Systems   Constitutional: Positive for chills and malaise/fatigue. Negative for fever.   Respiratory: Negative for cough and shortness of breath.    Cardiovascular: Negative for chest pain, palpitations and leg swelling.   Gastrointestinal: Negative for abdominal pain, nausea and vomiting.   Genitourinary: Negative for dysuria and flank pain.   Musculoskeletal: Positive for back pain and myalgias. Negative for falls.   Neurological: Positive for dizziness and weakness. Negative for speech change, focal weakness, loss of consciousness and headaches.   Psychiatric/Behavioral: Negative.  The patient is not nervous/anxious.    All other systems reviewed and are negative.      Past Medical History   has a past medical history of Acute left-sided low back pain  without sciatica (2/24/2020), Anesthesia, Chronic low back pain, Dental disorder, DM (diabetes mellitus) ([facility]), Hospital discharge follow-up (9/23/2020), Hypercholesteremia, Hypertension, Mass of cecum (9/15), Obesity (BMI 30.0-34.9), and Other specified disorder of intestines.    Surgical History   has a past surgical history that includes other; other; pelviscopy (4/6/2012); lysis adhesions gyn (4/6/2012); colonoscopy with polyp (2012); arthroscopy, knee (3/14); colonoscopy with polyp (8/13/15); cholecystectomy (8/21/2015); appendectomy (2015); and bowel resection laparoscopic (8/21/2015).     Family History  family history includes Alzheimer's Disease in her sister; Cancer in her mother; Dementia in her sister; Hypertension in her brother, brother, and father; Lung Disease in her mother; No Known Problems in her sister and sister.   Father with diabetes and HTN.     Social History   reports that she has never smoked. She has never used smokeless tobacco. She reports that she does not drink alcohol or use drugs.  Denies tobacco use, alcohol or illicit drugs.     Allergies  Allergies   Allergen Reactions   • Ace Inhibitors Cough     cough   • Codeine Rash and Itching     Vicodin doesn't cause her any problems   • Metronidazole Vomiting and Nausea       Medications  Prior to Admission Medications   Prescriptions Last Dose Informant Patient Reported? Taking?   Blood Glucose Test Strips  Patient No No   Sig: Use one Claudette Contour Next strip to test blood sugar once daily .   Empagliflozin (JARDIANCE) 10 MG Tab 11/5/2020 at 0600 Patient No No   Sig: Take 1 tablet by mouth every day for 360 days.   Insulin Pen Needle 32 G x 4 mm  Patient No No   Sig: Use one pen needle in pen device to inject insulin once daily.   Lancets  Patient No No   Sig: Use one Claudette Contour Next lancet to test blood sugar once daily .   amLODIPine (NORVASC) 5 MG Tab 11/4/2020 at pm Patient No No   Sig: Take 1 Tab by mouth every day.   aspirin 81  MG EC tablet 2020 at pm Patient No No   Sig: Take 1 Tab by mouth every day.   atorvastatin (LIPITOR) 40 MG Tab 2020 at pm Patient No No   Sig: TAKE ONE TABLET BY MOUTH EVERY NIGHT AT BEDTIME   chlorthalidone (HYGROTON) 25 MG Tab 2020 at pm Patient No No   Sig: Take 1 Tab by mouth every day for 360 days.   glucose blood strip  Patient No No   Si Strip by Other route every day. to test blood sugar   hydrALAZINE (APRESOLINE) 25 MG Tab 2020 at pm Patient No No   Sig: Take 3 Tabs by mouth every 8 hours.   insulin glargine (LANTUS SOLOSTAR) 100 UNIT/ML Solution Pen-injector injection 2020 at pm Patient No No   Sig: Inject 7 Units as instructed every evening.   metformin (GLUCOPHAGE) 1000 MG tablet 2020 at 0600 Patient No No   Sig: Take 1 Tab by mouth 2 times a day, with meals.   olmesartan (BENICAR) 40 MG Tab 2020 at pm Patient No No   Sig: Take 1 Tab by mouth every day.   spironolactone (ALDACTONE) 50 MG Tab 2020 at pm Patient No No   Sig: Take 1 Tab by mouth every morning for 360 days.      Facility-Administered Medications: None       Physical Exam  Temp:  [36.6 °C (97.9 °F)] 36.6 °C (97.9 °F)  Pulse:  [73-84] 73  Resp:  [14] 14  BP: (118-127)/(59-72) 120/59  SpO2:  [93 %-95 %] 93 %    Physical Exam  Vitals signs reviewed.   Constitutional:       General: She is not in acute distress.     Appearance: She is overweight. She is not diaphoretic.      Comments: Listless but cooperative   HENT:      Head: Normocephalic.      Mouth/Throat:      Comments: Tacky mucous membranes  Eyes:      General: No scleral icterus.        Right eye: No discharge.         Left eye: No discharge.      Extraocular Movements: Extraocular movements intact.   Neck:      Musculoskeletal: Normal range of motion. No neck rigidity.   Cardiovascular:      Rate and Rhythm: Normal rate and regular rhythm.      Pulses: Normal pulses.   Pulmonary:      Effort: Pulmonary effort is normal. No respiratory  distress.      Breath sounds: No wheezing or rales.      Comments: Diminished at the bases bilaterally  Abdominal:      General: Bowel sounds are normal.      Palpations: Abdomen is soft.      Tenderness: There is no abdominal tenderness. There is no guarding.   Musculoskeletal:         General: No tenderness.      Right lower leg: No edema.      Left lower leg: No edema.   Skin:     General: Skin is dry.      Coloration: Skin is not jaundiced.   Neurological:      General: No focal deficit present.      Mental Status: She is oriented to person, place, and time.   Psychiatric:         Mood and Affect: Mood normal.         Speech: Speech normal.         Behavior: Behavior normal.         Laboratory:  Recent Labs     11/03/20  0652 11/05/20  1341   WBC 6.7 7.9   RBC 4.51 4.63   HEMOGLOBIN 13.1 13.6   HEMATOCRIT 42.3 42.8   MCV 93.8 92.4   MCH 29.0 29.4   MCHC 31.0* 31.8*   RDW 51.3* 50.8*   PLATELETCT 292 338   MPV 10.9 10.4     Recent Labs     11/03/20  0652 11/05/20  1341   SODIUM 130* 137   POTASSIUM 5.7* 5.5   CHLORIDE 100 104   CO2 18* 15*   GLUCOSE 172* 149*   BUN 58* 70*   CREATININE 2.60* 3.55*   CALCIUM 9.9 10.4     Recent Labs     11/03/20  0652 11/05/20  1341   ALTSGPT 29 33   ASTSGOT 18 22   ALKPHOSPHAT 52 56   TBILIRUBIN 0.5 0.4   GLUCOSE 172* 149*         No results for input(s): NTPROBNP in the last 72 hours.  Recent Labs     11/03/20  0652   TRIGLYCERIDE 218*   HDL 41   LDL 70     No results for input(s): TROPONINT in the last 72 hours.    Imaging:  US-RENAL    (Results Pending)         Assessment/Plan:  I anticipate this patient is appropriate for observation status at this time.    * Metabolic acidosis- (present on admission)  Assessment & Plan  2/2 polypharmacy and poor PO intake. Bicarb down to 15  - holding BP medications  - bicarb drip then repeat BMP in the AM  - nephrology consulted, appreciate recs    Acute kidney injury superimposed on chronic kidney disease (HCC)- (present on  admission)  Assessment & Plan  TIMMY on CKD stage III. Suspect this is 2/2 hypoperfusion from multiple BP medications.   - renal US pending  - nephrology consulted, appreciate recs  - bicarb drip tonight; repeat BMP    Pre-syncope- (present on admission)  Assessment & Plan  2/2 dehydration and polypharmacy.   - fall precautions  - PT/OT eval pending    Type 2 diabetes mellitus with kidney complication, without long-term current use of insulin (HCC)- (present on admission)  Assessment & Plan  A1C elevated at 8.7% with mild hyperglycemia.   - sensitive sliding scale  - lantus 5U  - holding home metformin and jardiance     Obesity with serious comorbidity- (present on admission)  Assessment & Plan  Body mass index is 32.18 kg/m². She reports 9 pound weight loss over the last 2 weeks 2/2 poor PO intake    Essential hypertension- (present on admission)  Assessment & Plan  Currently normotensive.   - holding BP medications  - labetalol IV PRN    Dyslipidemia- (present on admission)  Assessment & Plan  - continue home statin    Chronic left-sided low back pain with left-sided sciatica- (present on admission)  Assessment & Plan  Acute on chronic back pain. 2/2 sleeping/laying down more?  - supportive care

## 2020-11-06 NOTE — ASSESSMENT & PLAN NOTE
TIMMY on CKD stage III. Of unclear etiology  - due to hypotension and excessive diuresis?  - renal US without acute findings  - nephrology consulted  - bicarb drip stopped  - encourage PO intake   - labs are improving  ; repeat BMP in am   - urinalysis cutlure sent and started on IV abx   - nephrology following

## 2020-11-06 NOTE — ED NOTES
Pharmacy Medication Reconciliation      Medication reconciliation updated and complete per pt at bedside  Allergies have been verified and updated   No oral ABX within the last 14 days  Patient home pharmacy:[facility]

## 2020-11-06 NOTE — PROGRESS NOTES
Hospital Medicine Daily Progress Note    Date of Service  11/6/2020    Chief Complaint  65 y.o. female admitted 11/5/2020 with fatigure, dizziness.     Hospital Course  65 y.o. female who presented 11/5/2020 with worsening fatigue and dizziness that has been progressing over the last 2 weeks. She reports that many of her medications were changed when she was discharged from the hospital in September. New medications from this discharge included hydralazine, HCTZ, spironolactone which were added to her Diltazem and losartan. On med rec today, her BP medications include amlodipine, olmesartan, spironolactone, and chlorthalidone. Unclear when this most recent change was made. In the ED, she was hemodynamically stable and afebrile. Saturating well on RA. Nephrology was consulted by the ERP as bicarb was 15, BUN 70 and Cr 3.55.    Interval Problem Update  Worsening renal function despite IVF  BP stable   No acute events overnight  Fatigue improved  Endorses urinary symptoms of dysuria and frequency, UA with evidence of uti    Consultants/Specialty  Nephrology     Code Status  Full Code    Disposition  TBD    Review of Systems  Review of Systems   Constitutional: Positive for malaise/fatigue. Negative for chills and fever.   HENT: Negative for congestion, hearing loss and tinnitus.    Eyes: Negative for blurred vision, double vision and discharge.   Respiratory: Negative for cough, hemoptysis and shortness of breath.    Cardiovascular: Negative for chest pain, palpitations and leg swelling.   Gastrointestinal: Negative for abdominal pain, heartburn, nausea and vomiting.   Genitourinary: Positive for dysuria and frequency. Negative for flank pain.   Musculoskeletal: Negative for joint pain and myalgias.   Skin: Negative for rash.   Neurological: Negative for dizziness, sensory change, speech change, focal weakness and weakness.   Endo/Heme/Allergies: Negative for environmental allergies. Does not bruise/bleed easily.    Psychiatric/Behavioral: Negative for depression, hallucinations and substance abuse.        Physical Exam  Temp:  [36.1 °C (97 °F)-36.8 °C (98.2 °F)] 36.6 °C (97.8 °F)  Pulse:  [61-84] 72  Resp:  [14-18] 15  BP: (104-137)/(45-72) 123/57  SpO2:  [93 %-97 %] 94 %    Physical Exam  Vitals signs reviewed.   Constitutional:       Appearance: Normal appearance.   HENT:      Head: Normocephalic and atraumatic.      Nose: No congestion.      Mouth/Throat:      Mouth: Mucous membranes are dry.   Eyes:      Extraocular Movements: Extraocular movements intact.      Pupils: Pupils are equal, round, and reactive to light.   Neck:      Musculoskeletal: Normal range of motion and neck supple. No muscular tenderness.   Cardiovascular:      Rate and Rhythm: Normal rate and regular rhythm.      Pulses: Normal pulses.      Heart sounds: Normal heart sounds. No murmur.   Pulmonary:      Effort: Pulmonary effort is normal. No respiratory distress.      Breath sounds: Normal breath sounds. No stridor.   Abdominal:      General: Bowel sounds are normal. There is no distension.      Palpations: Abdomen is soft.      Tenderness: There is no abdominal tenderness.   Musculoskeletal:         General: No swelling or deformity.   Skin:     General: Skin is warm and dry.      Capillary Refill: Capillary refill takes 2 to 3 seconds.   Neurological:      General: No focal deficit present.      Mental Status: She is alert and oriented to person, place, and time.   Psychiatric:         Mood and Affect: Mood normal.         Behavior: Behavior normal.         Thought Content: Thought content normal.         Judgment: Judgment normal.         Fluids    Intake/Output Summary (Last 24 hours) at 11/6/2020 1025  Last data filed at 11/6/2020 0600  Gross per 24 hour   Intake 607.5 ml   Output --   Net 607.5 ml       Laboratory  Recent Labs     11/05/20  1341   WBC 7.9   RBC 4.63   HEMOGLOBIN 13.6   HEMATOCRIT 42.8   MCV 92.4   MCH 29.4   MCHC 31.8*   RDW  50.8*   PLATELETCT 338   MPV 10.4     Recent Labs     11/05/20  1341 11/06/20  0037   SODIUM 137 135   POTASSIUM 5.5 5.0   CHLORIDE 104 101   CO2 15* 16*   GLUCOSE 149* 195*   BUN 70* 73*   CREATININE 3.55* 4.12*   CALCIUM 10.4 9.5                   Imaging  US-RENAL   Final Result      Mild left caliectasis.      Renal parenchyma appears echogenic which can be seen in medical renal disease.                 Assessment/Plan  * Acute kidney injury superimposed on chronic kidney disease (HCC)- (present on admission)  Assessment & Plan  TIMMY on CKD stage III. Of unclear etiology  - renal US without acute findings  - nephrology consulted  - bicarb drip uptitrated to 100 cc/hr  ; repeat BMP in am   - urinalysis cutlure sent and started on IV abx   - Follow up recs from Dr. Ventura    Pre-syncope- (present on admission)  Assessment & Plan  2/2 dehydration and polypharmacy.   - fall precautions  - PT/OT eval pending    Metabolic acidosis- (present on admission)  Assessment & Plan  2/2 polypharmacy and poor PO intake  - bicarb drip then repeat BMP in the AM  - nephrology consulted, appreciate recs    Type 2 diabetes mellitus with kidney complication, without long-term current use of insulin ([facility])- (present on admission)  Assessment & Plan  A1C elevated at 8.7% with mild hyperglycemia.   - sensitive sliding scale  - lantus 5U  - holding home metformin and jardiance     UTI (urinary tract infection)  Assessment & Plan  Cont with IV ropcehin   Follow cultures   descilate therapy as appropriate    Obesity with serious comorbidity- (present on admission)  Assessment & Plan  Body mass index is 32.18 kg/m².     Essential hypertension- (present on admission)  Assessment & Plan  - holding BP medications  - labetalol IV PRN    Dyslipidemia- (present on admission)  Assessment & Plan  - continue home statin    Chronic left-sided low back pain with left-sided sciatica- (present on admission)  Assessment & Plan  Acute on chronic back pain.  2/2 sleeping/laying down more?  - supportive care       VTE prophylaxis: heparin

## 2020-11-06 NOTE — CARE PLAN
Problem: Communication  Goal: The ability to communicate needs accurately and effectively will improve  Outcome: PROGRESSING AS EXPECTED     Problem: Safety  Goal: Will remain free from injury  Outcome: PROGRESSING AS EXPECTED  Goal: Will remain free from falls  Outcome: PROGRESSING AS EXPECTED     Problem: Venous Thromboembolism (VTW)/Deep Vein Thrombosis (DVT) Prevention:  Goal: Patient will participate in Venous Thrombosis (VTE)/Deep Vein Thrombosis (DVT)Prevention Measures  Outcome: PROGRESSING AS EXPECTED     Problem: Bowel/Gastric:  Goal: Normal bowel function is maintained or improved  Outcome: PROGRESSING AS EXPECTED     Problem: Knowledge Deficit  Goal: Knowledge of disease process/condition, treatment plan, diagnostic tests, and medications will improve  Outcome: PROGRESSING AS EXPECTED  Goal: Knowledge of the prescribed therapeutic regimen will improve  Outcome: PROGRESSING AS EXPECTED     Problem: Pain Management  Goal: Pain level will decrease to patient's comfort goal  Outcome: PROGRESSING AS EXPECTED

## 2020-11-06 NOTE — ASSESSMENT & PLAN NOTE
2/2 polypharmacy and poor PO intake  - bicarb drip then repeat BMP in the AM  - nephrology consulted, appreciate recs

## 2020-11-07 LAB
ALBUMIN SERPL BCP-MCNC: 3.7 G/DL (ref 3.2–4.9)
ALBUMIN/GLOB SERPL: 1.4 G/DL
ALP SERPL-CCNC: 45 U/L (ref 30–99)
ALT SERPL-CCNC: 22 U/L (ref 2–50)
ANION GAP SERPL CALC-SCNC: 11 MMOL/L (ref 7–16)
AST SERPL-CCNC: 12 U/L (ref 12–45)
BASOPHILS # BLD AUTO: 0.5 % (ref 0–1.8)
BASOPHILS # BLD: 0.03 K/UL (ref 0–0.12)
BILIRUB SERPL-MCNC: 0.2 MG/DL (ref 0.1–1.5)
BUN SERPL-MCNC: 61 MG/DL (ref 8–22)
C3 SERPL-MCNC: 112.7 MG/DL (ref 87–200)
C4 SERPL-MCNC: 31.6 MG/DL (ref 19–52)
CALCIUM SERPL-MCNC: 8.5 MG/DL (ref 8.5–10.5)
CHLORIDE SERPL-SCNC: 102 MMOL/L (ref 96–112)
CO2 SERPL-SCNC: 25 MMOL/L (ref 20–33)
CREAT SERPL-MCNC: 2.47 MG/DL (ref 0.5–1.4)
EOSINOPHIL # BLD AUTO: 0.19 K/UL (ref 0–0.51)
EOSINOPHIL NFR BLD: 3.1 % (ref 0–6.9)
ERYTHROCYTE [DISTWIDTH] IN BLOOD BY AUTOMATED COUNT: 47.8 FL (ref 35.9–50)
GLOBULIN SER CALC-MCNC: 2.6 G/DL (ref 1.9–3.5)
GLUCOSE BLD-MCNC: 127 MG/DL (ref 65–99)
GLUCOSE BLD-MCNC: 136 MG/DL (ref 65–99)
GLUCOSE BLD-MCNC: 179 MG/DL (ref 65–99)
GLUCOSE BLD-MCNC: 194 MG/DL (ref 65–99)
GLUCOSE SERPL-MCNC: 154 MG/DL (ref 65–99)
HCT VFR BLD AUTO: 33.9 % (ref 37–47)
HGB BLD-MCNC: 11.2 G/DL (ref 12–16)
IMM GRANULOCYTES # BLD AUTO: 0.01 K/UL (ref 0–0.11)
IMM GRANULOCYTES NFR BLD AUTO: 0.2 % (ref 0–0.9)
LYMPHOCYTES # BLD AUTO: 3.25 K/UL (ref 1–4.8)
LYMPHOCYTES NFR BLD: 53.1 % (ref 22–41)
MCH RBC QN AUTO: 29.8 PG (ref 27–33)
MCHC RBC AUTO-ENTMCNC: 33 G/DL (ref 33.6–35)
MCV RBC AUTO: 90.2 FL (ref 81.4–97.8)
MONOCYTES # BLD AUTO: 0.51 K/UL (ref 0–0.85)
MONOCYTES NFR BLD AUTO: 8.3 % (ref 0–13.4)
NEUTROPHILS # BLD AUTO: 2.13 K/UL (ref 2–7.15)
NEUTROPHILS NFR BLD: 34.8 % (ref 44–72)
NRBC # BLD AUTO: 0 K/UL
NRBC BLD-RTO: 0 /100 WBC
PLATELET # BLD AUTO: 263 K/UL (ref 164–446)
PMV BLD AUTO: 10.6 FL (ref 9–12.9)
POTASSIUM SERPL-SCNC: 3.9 MMOL/L (ref 3.6–5.5)
PROT SERPL-MCNC: 6.3 G/DL (ref 6–8.2)
RBC # BLD AUTO: 3.76 M/UL (ref 4.2–5.4)
SODIUM SERPL-SCNC: 138 MMOL/L (ref 135–145)
WBC # BLD AUTO: 6.1 K/UL (ref 4.8–10.8)

## 2020-11-07 PROCEDURE — 99214 OFFICE O/P EST MOD 30 MIN: CPT | Performed by: INTERNAL MEDICINE

## 2020-11-07 PROCEDURE — 80053 COMPREHEN METABOLIC PANEL: CPT

## 2020-11-07 PROCEDURE — 86038 ANTINUCLEAR ANTIBODIES: CPT

## 2020-11-07 PROCEDURE — 86255 FLUORESCENT ANTIBODY SCREEN: CPT

## 2020-11-07 PROCEDURE — 700111 HCHG RX REV CODE 636 W/ 250 OVERRIDE (IP): Performed by: HOSPITALIST

## 2020-11-07 PROCEDURE — 82962 GLUCOSE BLOOD TEST: CPT

## 2020-11-07 PROCEDURE — G0378 HOSPITAL OBSERVATION PER HR: HCPCS

## 2020-11-07 PROCEDURE — 36415 COLL VENOUS BLD VENIPUNCTURE: CPT

## 2020-11-07 PROCEDURE — 700111 HCHG RX REV CODE 636 W/ 250 OVERRIDE (IP)

## 2020-11-07 PROCEDURE — 700102 HCHG RX REV CODE 250 W/ 637 OVERRIDE(OP): Performed by: HOSPITALIST

## 2020-11-07 PROCEDURE — 99225 PR SUBSEQUENT OBSERVATION CARE,LEVEL II: CPT | Performed by: HOSPITALIST

## 2020-11-07 PROCEDURE — 700105 HCHG RX REV CODE 258: Performed by: HOSPITALIST

## 2020-11-07 PROCEDURE — A9270 NON-COVERED ITEM OR SERVICE: HCPCS | Performed by: HOSPITALIST

## 2020-11-07 PROCEDURE — 86160 COMPLEMENT ANTIGEN: CPT | Mod: 91

## 2020-11-07 PROCEDURE — 85025 COMPLETE CBC W/AUTO DIFF WBC: CPT

## 2020-11-07 PROCEDURE — 96372 THER/PROPH/DIAG INJ SC/IM: CPT

## 2020-11-07 RX ORDER — HEPARIN SODIUM 5000 [USP'U]/ML
INJECTION, SOLUTION INTRAVENOUS; SUBCUTANEOUS
Status: DISPENSED
Start: 2020-11-07 | End: 2020-11-07

## 2020-11-07 RX ORDER — HEPARIN SODIUM 5000 [USP'U]/ML
INJECTION, SOLUTION INTRAVENOUS; SUBCUTANEOUS
Status: COMPLETED
Start: 2020-11-07 | End: 2020-11-07

## 2020-11-07 RX ORDER — AMOXICILLIN 250 MG
CAPSULE ORAL
Status: COMPLETED
Start: 2020-11-07 | End: 2020-11-07

## 2020-11-07 RX ADMIN — INSULIN GLARGINE 5 UNITS: 100 INJECTION, SOLUTION SUBCUTANEOUS at 16:27

## 2020-11-07 RX ADMIN — ATORVASTATIN CALCIUM 40 MG: 40 TABLET, FILM COATED ORAL at 16:23

## 2020-11-07 RX ADMIN — DOCUSATE SODIUM 50 MG AND SENNOSIDES 8.6 MG 2 TABLET: 8.6; 5 TABLET, FILM COATED ORAL at 05:14

## 2020-11-07 RX ADMIN — ASPIRIN 81 MG: 81 TABLET, COATED ORAL at 05:14

## 2020-11-07 RX ADMIN — HEPARIN SODIUM: 5000 INJECTION INTRAVENOUS; SUBCUTANEOUS at 20:45

## 2020-11-07 RX ADMIN — INSULIN HUMAN 1 UNITS: 100 INJECTION, SOLUTION PARENTERAL at 20:39

## 2020-11-07 RX ADMIN — HEPARIN SODIUM: 5000 INJECTION, SOLUTION INTRAVENOUS; SUBCUTANEOUS at 20:45

## 2020-11-07 RX ADMIN — CEFTRIAXONE SODIUM 2 G: 2 INJECTION, POWDER, FOR SOLUTION INTRAMUSCULAR; INTRAVENOUS at 05:13

## 2020-11-07 RX ADMIN — INSULIN HUMAN 1 UNITS: 100 INJECTION, SOLUTION PARENTERAL at 11:27

## 2020-11-07 RX ADMIN — HEPARIN SODIUM 5000 UNITS: 5000 INJECTION INTRAVENOUS; SUBCUTANEOUS at 14:10

## 2020-11-07 RX ADMIN — HEPARIN SODIUM 5000 UNITS: 5000 INJECTION INTRAVENOUS; SUBCUTANEOUS at 05:14

## 2020-11-07 ASSESSMENT — ENCOUNTER SYMPTOMS
BLURRED VISION: 0
DEPRESSION: 0
WHEEZING: 0
ABDOMINAL PAIN: 0
BRUISES/BLEEDS EASILY: 0
HEMOPTYSIS: 0
SPEECH CHANGE: 0
ORTHOPNEA: 0
FLANK PAIN: 0
WEAKNESS: 0
FOCAL WEAKNESS: 0
MYALGIAS: 0
SHORTNESS OF BREATH: 0
EYES NEGATIVE: 1
DOUBLE VISION: 0
FEVER: 0
NAUSEA: 0
DIZZINESS: 0
CHILLS: 0
SINUS PAIN: 0
SENSORY CHANGE: 0
HEARTBURN: 0
EYE DISCHARGE: 0
VOMITING: 0
COUGH: 0
PALPITATIONS: 0
HALLUCINATIONS: 0

## 2020-11-07 ASSESSMENT — LIFESTYLE VARIABLES: SUBSTANCE_ABUSE: 0

## 2020-11-07 ASSESSMENT — PAIN DESCRIPTION - PAIN TYPE
TYPE: ACUTE PAIN
TYPE: ACUTE PAIN

## 2020-11-07 NOTE — CARE PLAN
Problem: Communication  Goal: The ability to communicate needs accurately and effectively will improve  Outcome: PROGRESSING AS EXPECTED     Problem: Safety  Goal: Will remain free from injury  Outcome: PROGRESSING AS EXPECTED  Goal: Will remain free from falls  Outcome: PROGRESSING AS EXPECTED     Problem: Infection  Goal: Will remain free from infection  Outcome: PROGRESSING AS EXPECTED     Problem: Venous Thromboembolism (VTW)/Deep Vein Thrombosis (DVT) Prevention:  Goal: Patient will participate in Venous Thrombosis (VTE)/Deep Vein Thrombosis (DVT)Prevention Measures  Outcome: PROGRESSING AS EXPECTED     Problem: Knowledge Deficit  Goal: Knowledge of disease process/condition, treatment plan, diagnostic tests, and medications will improve  Outcome: PROGRESSING AS EXPECTED  Goal: Knowledge of the prescribed therapeutic regimen will improve  Outcome: PROGRESSING AS EXPECTED

## 2020-11-07 NOTE — CONSULTS
DATE OF SERVICE:  11/06/2020    NEPHROLOGY CONSULTATION    REQUESTING PHYSICIAN:  Sergo Umanzor MD    REASON FOR CONSULTATION:  Evaluate patient with acute kidney injury.    HISTORY OF PRESENT ILLNESS:  The patient is a 65-year-old female with past   medical history of hypertension.  Actually, she was hospitalized several weeks   ago with uncontrolled blood pressure, and at that time, her creatinine level   was at 1.1-1.2.  Discharged with new medications.  Blood pressure under better   control; however, states that since then, she has been feeling progressively   worse, more fatigued, dizziness, poor appetite, presented to the emergency   room on 11/03/2020 and found to have acute kidney injury with creatinine of   2.6, potassium 5.7, CO2 18, and sodium 130.  Advised to come to the emergency   room.  Upon evaluation in the emergency room, found with a creatinine level of   3.55, worsened to 4.12, metabolic acidosis with CO2 of 15-16, and   hyperkalemia actually improved to 5.5.  Urine electrolytes; sodium 49,   potassium 51, trace protein.  Currently, still not feeling well, noticed   decreased urine output, still with some lightheadedness and nausea.  Over the   past several weeks, has been having recurrent episodes of diarrhea.    REVIEW OF SYSTEMS:  GENERAL:  Positive for fatigue, weight loss, poor appetite.  No fever or   chills.  HEENT:  No sinus pain, no nosebleeds, no sore throat.  EYES:  No double or blurry vision.  NECK:  No pain, no stiffness.  RESPIRATORY:  No cough, no hemoptysis, no wheezes.  CARDIOVASCULAR:  No dyspnea, no swellings, no palpitations.  GASTROINTESTINAL:  No abdominal pain.  Positive for nausea.  No vomiting.    Positive for diarrhea.  GENITOURINARY:  No dysuria or hematuria.  No flank pain.  MUSCULOSKELETAL:  No back pain, no joint pain, no stiffness.  NEUROLOGICAL:  Positive for dizziness.  No headache, no focal weakness.    All other systems reviewed, all negative.    PAST  MEDICAL HISTORY:  Sciatica pain, chronic low back pain, diabetes mellitus   type 2, hyperlipidemia, hypertension.    PAST SURGICAL HISTORY:  Pelviscopy, lysis of adhesions, GYN surgery,   colonoscopy, arthroscopy, cholecystectomy, appendectomy, bowel resection.    SOCIAL HISTORY:  No tobacco, alcohol, or drug use.    FAMILY HISTORY:  Positive for breast cancer, emphysema, hypertension,   Alzheimer disease, dementia, hypertension.    ALLERGIES:  ALLERGIC TO ACE INHIBITOR CAUSING COUGH, CODEINE, METRONIDAZOLE.    OUTPATIENT MEDICATIONS:  Amlodipine, Lipitor, chlorthalidone, Jardiance,   hydralazine, insulin, metformin, losartan, spironolactone.    PHYSICAL EXAMINATION:  VITAL SIGNS:  Blood pressure 104/45, heart rate 61, temperature 36.3.  GENERAL APPEARANCE:  Well-developed, well-nourished female in no acute   distress.  HEENT:  Normocephalic, atraumatic.  Pupils equal, round, reactive to light.    Extraocular movement intact.  Nares patent.  Oropharynx is clear, moist   mucosa.  No erythema or exudate.  NECK:  Supple, no lymphadenopathy, no thyromegaly appreciated.  LUNGS:  Clear to auscultation bilaterally.  No rales, wheezes, no rhonchi.  HEART:  Regular rate.  No rub or gallop.  ABDOMEN:  Soft, nontender, and nondistended.  Bowel sounds present.  No   palpable masses.  No costovertebral tenderness to palpation.  EXTREMITIES:  No cyanosis, no clubbing, no edema.  NEUROLOGIC:  Alert and oriented x3, no focal deficits.    LABORATORY RESULTS:  Reviewed, revealed hemoglobin level 13.6.  Sodium 135,   potassium 5.0, CO2 of 16, BUN 73, creatinine 4.12.    Urinalysis, large leukocyte esterase, packed white blood cells, rbc's 10-20.    Renal ultrasound revealed mild left ____ disease, echogenic parenchyma, no   hydronephrosis.    ASSESSMENT AND PLAN:  The patient is a 65-year-old female with past medical   history significant for hypertension, diabetes mellitus type 2, chronic kidney   disease stage III, admitted to the  hospital with complaints of fatigue,   weakness, poor p.o. intake, diarrhea, found to be in acute kidney injury and   metabolic acidosis.  1.  Acute kidney injury of unclear etiology, possible renal hypoperfusion from   low blood pressure upon admission.  Also, cannot exclude other like nephritis   causes.  2.  Electrolytes.  Hyperkalemia improved; hyponatremia, mild; normalized.  3.  Hypertension.  Blood pressure in the lower side.  Hold blood pressure   medications at present time.  4.  Metabolic acidosis.  Continue bicarbonate drip.  5.  Volume.  Continue IV fluids.    RECOMMENDATIONS:  1.  Continue to monitor renal function closely.  No need for emergent   dialysis.  2.  To complete serology to rule out nephritis.    Adjust all medications to renal doses.  Avoid nephrotoxic agents.  We will   follow up patient closely.    Thank you for the consult.       ____________________________________     MD DONNA VALDERRAMA / KOBE    DD:  11/06/2020 15:31:02  DT:  11/06/2020 18:44:12    D#:  [id number]  Job#:  [id number]

## 2020-11-07 NOTE — PROGRESS NOTES
Hospital Medicine Daily Progress Note    Date of Service  11/7/2020    Chief Complaint  65 y.o. female admitted 11/5/2020 with fatigure, dizziness.     Hospital Course  65 y.o. female who presented 11/5/2020 with worsening fatigue and dizziness that has been progressing over the last 2 weeks. She reports that many of her medications were changed when she was discharged from the hospital in September. New medications from this discharge included hydralazine, HCTZ, spironolactone which were added to her Diltazem and losartan. On med rec today, her BP medications include amlodipine, olmesartan, spironolactone, and chlorthalidone. Unclear when this most recent change was made. In the ED, she was hemodynamically stable and afebrile. Saturating well on RA. Nephrology was consulted by the ERP as bicarb was 15, BUN 70 and Cr 3.55.    Interval Problem Update  Renal function appears to be improving  BP stable off anti hypertensive medicatoins   No acute events overnight  VS stable   No complaints this am    Consultants/Specialty  Nephrology     Code Status  Full Code    Disposition  TBD    Review of Systems  Review of Systems   Constitutional: Positive for malaise/fatigue. Negative for chills and fever.   HENT: Negative for congestion, hearing loss and tinnitus.    Eyes: Negative for blurred vision, double vision and discharge.   Respiratory: Negative for cough, hemoptysis and shortness of breath.    Cardiovascular: Negative for chest pain, palpitations and leg swelling.   Gastrointestinal: Negative for abdominal pain, heartburn, nausea and vomiting.   Genitourinary: Positive for dysuria and frequency. Negative for flank pain.   Musculoskeletal: Negative for joint pain and myalgias.   Skin: Negative for rash.   Neurological: Negative for dizziness, sensory change, speech change, focal weakness and weakness.   Endo/Heme/Allergies: Negative for environmental allergies. Does not bruise/bleed easily.   Psychiatric/Behavioral:  Negative for depression, hallucinations and substance abuse.        Physical Exam  Temp:  [36.2 °C (97.2 °F)-36.6 °C (97.8 °F)] 36.6 °C (97.8 °F)  Pulse:  [64-75] 64  Resp:  [14-16] 16  BP: (118-131)/(58-66) 128/66  SpO2:  [91 %-95 %] 94 %    Physical Exam  Vitals signs reviewed.   Constitutional:       Appearance: Normal appearance.   HENT:      Head: Normocephalic and atraumatic.      Nose: No congestion.      Mouth/Throat:      Mouth: Mucous membranes are dry.   Eyes:      Extraocular Movements: Extraocular movements intact.      Pupils: Pupils are equal, round, and reactive to light.   Neck:      Musculoskeletal: Normal range of motion and neck supple. No muscular tenderness.   Cardiovascular:      Rate and Rhythm: Normal rate and regular rhythm.      Pulses: Normal pulses.      Heart sounds: Normal heart sounds. No murmur.   Pulmonary:      Effort: Pulmonary effort is normal. No respiratory distress.      Breath sounds: Normal breath sounds. No stridor.   Abdominal:      General: Bowel sounds are normal. There is no distension.      Palpations: Abdomen is soft.      Tenderness: There is no abdominal tenderness.   Musculoskeletal:         General: No swelling or deformity.   Skin:     General: Skin is warm and dry.      Capillary Refill: Capillary refill takes 2 to 3 seconds.   Neurological:      General: No focal deficit present.      Mental Status: She is alert and oriented to person, place, and time.   Psychiatric:         Mood and Affect: Mood normal.         Behavior: Behavior normal.         Thought Content: Thought content normal.         Judgment: Judgment normal.         Fluids    Intake/Output Summary (Last 24 hours) at 11/7/2020 0831  Last data filed at 11/7/2020 0400  Gross per 24 hour   Intake 600 ml   Output --   Net 600 ml       Laboratory  Recent Labs     11/05/20  1341 11/07/20  0040   WBC 7.9 6.1   RBC 4.63 3.76*   HEMOGLOBIN 13.6 11.2*   HEMATOCRIT 42.8 33.9*   MCV 92.4 90.2   MCH 29.4 29.8    MCHC 31.8* 33.0*   RDW 50.8* 47.8   PLATELETCT 338 263   MPV 10.4 10.6     Recent Labs     11/06/20  0037 11/06/20  1818 11/07/20  0040   SODIUM 135 134* 138   POTASSIUM 5.0 4.5 3.9   CHLORIDE 101 98 102   CO2 16* 21 25   GLUCOSE 195* 219* 154*   BUN 73* 68* 61*   CREATININE 4.12* 2.86* 2.47*   CALCIUM 9.5 8.7 8.5                   Imaging  US-RENAL   Final Result      Mild left caliectasis.      Renal parenchyma appears echogenic which can be seen in medical renal disease.                 Assessment/Plan  * Acute kidney injury superimposed on chronic kidney disease (HCC)- (present on admission)  Assessment & Plan  TIMMY on CKD stage III. Of unclear etiology  - due to hypotension and excessive diuresis?  - renal US without acute findings  - nephrology consulted  - bicarb drip stopped  - encourage PO intake   - labs are improving  ; repeat BMP in am   - urinalysis cutlure sent and started on IV abx   - nephrology following    Pre-syncope- (present on admission)  Assessment & Plan  2/2 dehydration and polypharmacy.   - fall precautions  - PT/OT eval pending    Metabolic acidosis- (present on admission)  Assessment & Plan  2/2 polypharmacy and poor PO intake  - bicarb drip then repeat BMP in the AM  - nephrology consulted, appreciate recs    Type 2 diabetes mellitus with kidney complication, without long-term current use of insulin ([facility])- (present on admission)  Assessment & Plan  A1C elevated at 8.7% with mild hyperglycemia.   - sensitive sliding scale  - lantus 5U      UTI (urinary tract infection)  Assessment & Plan  Cont with IV ropcehin   Follow cultures   descilate therapy as appropriate    Obesity with serious comorbidity- (present on admission)  Assessment & Plan  Body mass index is 32.18 kg/m².     Essential hypertension- (present on admission)  Assessment & Plan  - holding BP medications  - labetalol IV PRN    Dyslipidemia- (present on admission)  Assessment & Plan  - continue home statin    Chronic  left-sided low back pain with left-sided sciatica- (present on admission)  Assessment & Plan  Acute on chronic back pain. 2/2 sleeping/laying down more?  - supportive care       VTE prophylaxis: heparin

## 2020-11-07 NOTE — PROGRESS NOTES
Nephrology Daily Progress Note    Date of Service  11/7/2020    Chief Complaint  65 y.o. female with CKD III, HTN, admitted 11/5/2020 with fatigue, poor po intake in TIMMY    Interval Problem Update  11/7  Doing better  Good UOP  Creat level improving    Review of Systems  Review of Systems   Constitutional: Positive for malaise/fatigue. Negative for chills and fever.   HENT: Negative for congestion, hearing loss and sinus pain.    Eyes: Negative.    Respiratory: Negative for cough, hemoptysis, shortness of breath and wheezing.    Cardiovascular: Negative for chest pain, palpitations, orthopnea and leg swelling.   Gastrointestinal: Negative for abdominal pain, nausea and vomiting.   Genitourinary: Negative for dysuria, flank pain, frequency, hematuria and urgency.   Skin: Negative.    All other systems reviewed and are negative.       Physical Exam  Temp:  [36.2 °C (97.2 °F)-36.6 °C (97.8 °F)] 36.2 °C (97.2 °F)  Pulse:  [65-75] 75  Resp:  [14-16] 14  BP: (118-131)/(57-64) 131/63  SpO2:  [91 %-95 %] 91 %    Physical Exam  Vitals signs and nursing note reviewed.   Constitutional:       General: She is not in acute distress.     Appearance: Normal appearance. She is well-developed. She is not diaphoretic.   HENT:      Head: Normocephalic and atraumatic.      Nose: Nose normal.      Mouth/Throat:      Mouth: Mucous membranes are moist.      Pharynx: Oropharynx is clear.   Eyes:      Extraocular Movements: Extraocular movements intact.      Conjunctiva/sclera: Conjunctivae normal.      Pupils: Pupils are equal, round, and reactive to light.   Neck:      Musculoskeletal: Normal range of motion and neck supple.   Cardiovascular:      Rate and Rhythm: Normal rate and regular rhythm.      Pulses: Normal pulses.      Heart sounds: Normal heart sounds. No gallop.    Pulmonary:      Effort: Pulmonary effort is normal. No respiratory distress.      Breath sounds: Normal breath sounds. No wheezing, rhonchi or rales.   Abdominal:       General: Bowel sounds are normal. There is no distension.      Palpations: Abdomen is soft. There is no mass.      Tenderness: There is no abdominal tenderness. There is no right CVA tenderness or guarding.   Musculoskeletal:      Right lower leg: No edema.      Left lower leg: No edema.   Skin:     General: Skin is warm.      Coloration: Skin is not jaundiced.      Findings: No erythema or rash.   Neurological:      General: No focal deficit present.      Mental Status: She is alert and oriented to person, place, and time.      Cranial Nerves: No cranial nerve deficit.      Coordination: Coordination normal.   Psychiatric:         Mood and Affect: Mood normal.         Behavior: Behavior normal.         Thought Content: Thought content normal.         Judgment: Judgment normal.         Fluids    Intake/Output Summary (Last 24 hours) at 11/7/2020 0550  Last data filed at 11/7/2020 0400  Gross per 24 hour   Intake 900 ml   Output --   Net 900 ml       Laboratory  Recent Labs     11/05/20  1341 11/07/20  0040   WBC 7.9 6.1   RBC 4.63 3.76*   HEMOGLOBIN 13.6 11.2*   HEMATOCRIT 42.8 33.9*   MCV 92.4 90.2   MCH 29.4 29.8   MCHC 31.8* 33.0*   RDW 50.8* 47.8   PLATELETCT 338 263   MPV 10.4 10.6     Recent Labs     11/06/20  0037 11/06/20  1818 11/07/20  0040   SODIUM 135 134* 138   POTASSIUM 5.0 4.5 3.9   CHLORIDE 101 98 102   CO2 16* 21 25   GLUCOSE 195* 219* 154*   BUN 73* 68* 61*   CREATININE 4.12* 2.86* 2.47*   CALCIUM 9.5 8.7 8.5         Recent Labs     11/05/20  1341   NTPROBNP 95           Imaging  Renal US reviewed    Assessment/Plan  1.TIMMY/CKD III -improving -to monitor  2.HTN: BP well controlled  3.Electrolytes: well controlled.  4.Anemia: drop in Hb level -to monitor  5.Metabolic acidosis corrected  6.Volume: controlled with IVF    Recs:  D/c bicarb gtt  Increase po fluid intake  Monitor BMP  Low salt diet  Avoid nephrotoxins  Will follow

## 2020-11-07 NOTE — PROGRESS NOTES
Report received. Assumed care. Pt in bed awake. A/O x4. VSS. Responds appropriately. Denies pain, SOB. Assessment complete. Discussed POC, monitor VS/labs, IV antbx, mobility, pain control, safety, DC planning , pt verbalizes understanding. Explained importance of calling before getting OOB. Call light and belongings within reach. Bed in the lowest position. Treaded socks in place. Hourly rounding in progress. Will continue to monitor .

## 2020-11-07 NOTE — DISCHARGE PLANNING
Anticipated Discharge Disposition:   Home    Action:    Pt with acute kidney injury superimposed on chronic kidney disease, pre-syncope, metabolic acidosis, type 2 DM, UTI, obesity, essential HTN, dyslipidemia, chonic left-sided low back pain with sciatica.    RN SHE spoke to patient.  She stated she lives alone with her dog in a mobile home in [place].  Her niece is watching her dog.  Pt works full time at [employer].  She hadn't been feeling well and was informed that her kidneys had a problem and came to the hospital.  Pt had questions about disability.  Information provided for her review.  Discussed FMLA and steps for assistance were given.    Barriers to Discharge:    Medical clearance    Plan:    Provide transitional care coordination as needed.    Care Transition Team Assessment    Information Source  Orientation : Oriented x 4  Information Given By: Patient  Who is responsible for making decisions for patient? : Patient    Readmission Evaluation  Is this a readmission?: No    Elopement Risk  Legal Hold: No  Ambulatory or Self Mobile in Wheelchair: Yes  Disoriented: No  Psychiatric Symptoms: None  History of Wandering: No  Elopement this Admit: No  Vocalizing Wanting to Leave: No  Displays Behaviors, Body Language Wanting to Leave: No-Not at Risk for Elopement  Elopement Risk: Not at Risk for Elopement    Interdisciplinary Discharge Planning  Lives with - Patient's Self Care Capacity: Alone and Able to Care For Self  Patient or legal guardian wants to designate a caregiver: No  Support Systems: Children  Housing / Facility: Mobile Home  Durable Medical Equipment: Not Applicable    Discharge Preparedness  What is your plan after discharge?: Uncertain - pending medical team collaboration  What are your discharge supports?: Child, Sibling  Prior Functional Level: Ambulatory, Drives Self, Independent with Activities of Daily Living, Independent with Medication Management  Difficulity with ADLs: None  Difficulity with  IADLs: None    Functional Assesment  Prior Functional Level: Ambulatory, Drives Self, Independent with Activities of Daily Living, Independent with Medication Management    Finances  Financial Barriers to Discharge: No  Prescription Coverage: Yes    Vision / Hearing Impairment  Vision Impairment : No  Hearing Impairment : No         Advance Directive  Advance Directive?: None    Domestic Abuse  Have you ever been the victim of abuse or violence?: No  Physical Abuse or Sexual Abuse: No  Verbal Abuse or Emotional Abuse: No  Possible Abuse/Neglect Reported to:: Not Applicable         Discharge Risks or Barriers  Discharge risks or barriers?: No    Anticipated Discharge Information  Discharge Disposition: Still a Patient (30)  Discharge Address: [address] [place] [address] [place] [id number]  Discharge Contact Phone Number: [phone]

## 2020-11-08 VITALS
OXYGEN SATURATION: 95 % | SYSTOLIC BLOOD PRESSURE: 126 MMHG | TEMPERATURE: 98 F | BODY MASS INDEX: 32.25 KG/M2 | WEIGHT: 205.47 LBS | RESPIRATION RATE: 18 BRPM | HEART RATE: 76 BPM | HEIGHT: 67 IN | DIASTOLIC BLOOD PRESSURE: 76 MMHG

## 2020-11-08 PROBLEM — N18.9 ACUTE KIDNEY INJURY SUPERIMPOSED ON CHRONIC KIDNEY DISEASE (HCC): Status: RESOLVED | Noted: 2020-11-05 | Resolved: 2020-11-08

## 2020-11-08 PROBLEM — N17.9 ACUTE KIDNEY INJURY SUPERIMPOSED ON CHRONIC KIDNEY DISEASE (HCC): Status: RESOLVED | Noted: 2020-11-05 | Resolved: 2020-11-08

## 2020-11-08 LAB
ANION GAP SERPL CALC-SCNC: 9 MMOL/L (ref 7–16)
BUN SERPL-MCNC: 43 MG/DL (ref 8–22)
CALCIUM SERPL-MCNC: 9.1 MG/DL (ref 8.5–10.5)
CHLORIDE SERPL-SCNC: 102 MMOL/L (ref 96–112)
CO2 SERPL-SCNC: 26 MMOL/L (ref 20–33)
CREAT SERPL-MCNC: 1.54 MG/DL (ref 0.5–1.4)
GLUCOSE BLD-MCNC: 123 MG/DL (ref 65–99)
GLUCOSE BLD-MCNC: 191 MG/DL (ref 65–99)
GLUCOSE SERPL-MCNC: 142 MG/DL (ref 65–99)
POTASSIUM SERPL-SCNC: 4 MMOL/L (ref 3.6–5.5)
SODIUM SERPL-SCNC: 137 MMOL/L (ref 135–145)

## 2020-11-08 PROCEDURE — 99214 OFFICE O/P EST MOD 30 MIN: CPT | Performed by: INTERNAL MEDICINE

## 2020-11-08 PROCEDURE — A9270 NON-COVERED ITEM OR SERVICE: HCPCS

## 2020-11-08 PROCEDURE — 99217 PR OBSERVATION CARE DISCHARGE: CPT | Performed by: HOSPITALIST

## 2020-11-08 PROCEDURE — 97161 PT EVAL LOW COMPLEX 20 MIN: CPT

## 2020-11-08 PROCEDURE — 80048 BASIC METABOLIC PNL TOTAL CA: CPT

## 2020-11-08 PROCEDURE — 700105 HCHG RX REV CODE 258: Performed by: HOSPITALIST

## 2020-11-08 PROCEDURE — 97166 OT EVAL MOD COMPLEX 45 MIN: CPT

## 2020-11-08 PROCEDURE — 700111 HCHG RX REV CODE 636 W/ 250 OVERRIDE (IP): Performed by: HOSPITALIST

## 2020-11-08 PROCEDURE — G0378 HOSPITAL OBSERVATION PER HR: HCPCS

## 2020-11-08 PROCEDURE — 96372 THER/PROPH/DIAG INJ SC/IM: CPT

## 2020-11-08 PROCEDURE — 82962 GLUCOSE BLOOD TEST: CPT

## 2020-11-08 PROCEDURE — 700102 HCHG RX REV CODE 250 W/ 637 OVERRIDE(OP)

## 2020-11-08 PROCEDURE — 700111 HCHG RX REV CODE 636 W/ 250 OVERRIDE (IP)

## 2020-11-08 PROCEDURE — 36415 COLL VENOUS BLD VENIPUNCTURE: CPT

## 2020-11-08 RX ORDER — AMLODIPINE BESYLATE 5 MG/1
5 TABLET ORAL DAILY
Qty: 30 TAB | Refills: 11 | Status: SHIPPED | OUTPATIENT
Start: 2020-11-08 | End: 2020-12-07 | Stop reason: SDUPTHER

## 2020-11-08 RX ORDER — HEPARIN SODIUM 5000 [USP'U]/ML
INJECTION, SOLUTION INTRAVENOUS; SUBCUTANEOUS
Status: COMPLETED
Start: 2020-11-08 | End: 2020-11-08

## 2020-11-08 RX ADMIN — INSULIN HUMAN 1 UNITS: 100 INJECTION, SOLUTION PARENTERAL at 11:39

## 2020-11-08 RX ADMIN — HEPARIN SODIUM 5000 UNITS: 5000 INJECTION, SOLUTION INTRAVENOUS; SUBCUTANEOUS at 04:35

## 2020-11-08 RX ADMIN — CEFTRIAXONE SODIUM 2 G: 2 INJECTION, POWDER, FOR SOLUTION INTRAMUSCULAR; INTRAVENOUS at 04:30

## 2020-11-08 RX ADMIN — ASPIRIN 81 MG: 81 TABLET, COATED ORAL at 04:30

## 2020-11-08 RX ADMIN — HEPARIN SODIUM 5000 UNITS: 5000 INJECTION INTRAVENOUS; SUBCUTANEOUS at 04:35

## 2020-11-08 ASSESSMENT — COGNITIVE AND FUNCTIONAL STATUS - GENERAL
SUGGESTED CMS G CODE MODIFIER DAILY ACTIVITY: CH
SUGGESTED CMS G CODE MODIFIER MOBILITY: CH
DAILY ACTIVITIY SCORE: 24
MOBILITY SCORE: 24

## 2020-11-08 ASSESSMENT — ENCOUNTER SYMPTOMS
NAUSEA: 0
PALPITATIONS: 0
ORTHOPNEA: 0
EYES NEGATIVE: 1
MYALGIAS: 0
FEVER: 0
SINUS PAIN: 0
BACK PAIN: 0
VOMITING: 0
CHILLS: 0
COUGH: 0
NECK PAIN: 0
ABDOMINAL PAIN: 0
WHEEZING: 0
DIARRHEA: 0
SHORTNESS OF BREATH: 0
FLANK PAIN: 0
HEMOPTYSIS: 0
WEIGHT LOSS: 0

## 2020-11-08 ASSESSMENT — GAIT ASSESSMENTS
DEVIATION: NO DEVIATION
DISTANCE (FEET): 120
GAIT LEVEL OF ASSIST: MODIFIED INDEPENDENT

## 2020-11-08 ASSESSMENT — PAIN DESCRIPTION - PAIN TYPE: TYPE: ACUTE PAIN

## 2020-11-08 ASSESSMENT — ACTIVITIES OF DAILY LIVING (ADL): TOILETING: INDEPENDENT

## 2020-11-08 NOTE — THERAPY
Occupational Therapy   Initial Evaluation     Patient Name: Brenda Davidson  Age:  65 y.o., Sex:  female  Medical Record #: [id number]  Today's Date: 11/8/2020          Assessment  Patient is 65 y.o. female with a diagnosis of fatigue, dizziness, TIMMY.  Additional factors influencing patient status / progress: prior level of independence, motivated to return home.      Plan    Recommend Occupational Therapy for Evaluation only for the following treatments:  NA.    DC Equipment Recommendations: (P) None  Discharge Recommendations: (P) Anticipate that the patient will have no further occupational therapy needs after discharge from the hospital     Subjective    Pleasant and cooperative, appears to have returned to functional baseline     Objective       11/08/20 0915   Total Time Spent   Total Time Spent (Mins) 40   Charge Group   OT Evaluation OT Evaluation Mod   Prior Living Situation   Prior Services None   Housing / Facility Mobile Home   Steps Into Home 2   Steps In Home 0   Equipment Owned None   Lives with - Patient's Self Care Capacity Alone and Able to Care For Self   Comments independent prior, working at [employer]   Prior Level of ADL Function   Self Feeding Independent   Grooming / Hygiene Independent   Bathing Independent   Dressing Independent   Toileting Independent   Prior Level of IADL Function   Medication Management Independent   Laundry Independent   Kitchen Mobility Independent   Finances Independent   Home Management Independent   Shopping Independent   Prior Level Of Mobility Independent Without Device in Community   Driving / Transportation Driving Independent   Occupation (Pre-Hospital Vocational) Employed Full Time  ([employer])   History of Falls   History of Falls No   Vitals   O2 Delivery Device None - Room Air   Pain 0 - 10 Group   Therapist Pain Assessment Post Activity Pain Same as Prior to Activity;Nurse Notified;0   Cognition    Cognition / Consciousness WDL   Passive ROM Upper Body    Passive ROM Upper Body WDL   Active ROM Upper Body   Active ROM Upper Body  WDL   Dominant Hand Right   Strength Upper Body   Upper Body Strength  WDL   Sensation Upper Body   Upper Extremity Sensation  WDL   Upper Body Muscle Tone   Upper Body Muscle Tone  WDL   Coordination Upper Body   Coordination WDL   Balance Assessment   Sitting Balance (Static) Good   Sitting Balance (Dynamic) Good   Standing Balance (Static) Good   Standing Balance (Dynamic) Good   Weight Shift Sitting Good   Weight Shift Standing Good   Bed Mobility    Supine to Sit Modified Independent   Sit to Supine Modified Independent   Scooting Modified Independent   ADL Assessment   Eating Independent   Grooming Modified Independent;Standing   Bathing Supervision   Upper Body Dressing Supervision   Lower Body Dressing Supervision   Toileting Supervision   How much help from another person does the patient currently need...   Putting on and taking off regular lower body clothing? 4   Bathing (including washing, rinsing, and drying)? 4   Toileting, which includes using a toilet, bedpan, or urinal? 4   Putting on and taking off regular upper body clothing? 4   Taking care of personal grooming such as brushing teeth? 4   Eating meals? 4   6 Clicks Daily Activity Score 24   Functional Mobility   Sit to Stand Modified Independent   Bed, Chair, Wheelchair Transfer Modified Independent   Toilet Transfers Supervised   Transfer Method Stand Pivot   Visual Perception   Visual Perception  WDL   Education Group   Role of Occupational Therapist Patient Response Patient;Acceptance;Explanation;Demonstration;Verbal Demonstration;Action Demonstration   Anticipated Discharge Equipment and Recommendations   DC Equipment Recommendations None   Discharge Recommendations Anticipate that the patient will have no further occupational therapy needs after discharge from the hospital   Interdisciplinary Plan of Care Collaboration   IDT Collaboration with   Nursing;Occupational Therapist   Patient Position at End of Therapy In Bed;Call Light within Reach;Tray Table within Reach;Phone within Reach   Collaboration Comments report given   Session Information   Date / Session Number  11/8, 1/1

## 2020-11-08 NOTE — PROGRESS NOTES
Report received. Assumed care. Pt in bed awake. A/O x4. VSS. Responds appropriately. Denies pain, SOB. Assessment complete. Discussed POC, monitor VS/labs, pain control, mobility, safety, DC planning , pt verbalizes understanding. Explained importance of calling before getting OOB. Call light and belongings within reach.  Bed in the lowest position. Treaded socks in place. Hourly rounding in progress. Will continue to monitor .

## 2020-11-08 NOTE — CARE PLAN
Problem: Infection  Goal: Will remain free from infection  Outcome: PROGRESSING AS EXPECTED  Note: Afebrile. Denies chills. Vitals WNL.      Problem: Mobility  Goal: Risk for activity intolerance will decrease  Outcome: PROGRESSING AS EXPECTED  Note: Denies pain med needs. Resting and calm.

## 2020-11-08 NOTE — PROGRESS NOTES
D/C'd home.  Discharge instructions provided to pt.  Pt states understanding.  Pt states all questions have been answered.  Copy of discharge provided to pt.  Signed copy in chart.   Pt states that all personal belongings are in possession.   Pt escorted off unit with assistance from CNA via wheelchair at 1300 without incident.

## 2020-11-08 NOTE — PROGRESS NOTES
Nephrology Daily Progress Note    Date of Service  11/8/2020    Chief Complaint  65 y.o. female with CKD III, HTN, admitted 11/5/2020 with fatigue, poor po intake in TIMMY    Interval Problem Update  11/7  Doing better  Good UOP  Creat level improving  11/8 doing well, no complaints  Good UOP  Creat level improved to 1.5    Review of Systems  Review of Systems   Constitutional: Negative for chills, fever, malaise/fatigue and weight loss.   HENT: Negative for congestion, hearing loss and sinus pain.    Eyes: Negative.    Respiratory: Negative for cough, hemoptysis, shortness of breath and wheezing.    Cardiovascular: Negative for chest pain, palpitations, orthopnea and leg swelling.   Gastrointestinal: Negative for abdominal pain, diarrhea, nausea and vomiting.   Genitourinary: Negative for dysuria, flank pain, frequency, hematuria and urgency.   Musculoskeletal: Negative for back pain, joint pain, myalgias and neck pain.   Skin: Negative.    All other systems reviewed and are negative.       Physical Exam  Temp:  [36.3 °C (97.4 °F)-36.7 °C (98.1 °F)] 36.7 °C (98 °F)  Pulse:  [62-76] 76  Resp:  [17-18] 18  BP: (126-146)/(67-79) 126/76  SpO2:  [91 %-95 %] 95 %    Physical Exam  Vitals signs and nursing note reviewed.   Constitutional:       General: She is not in acute distress.     Appearance: Normal appearance. She is well-developed. She is not diaphoretic.   HENT:      Head: Normocephalic and atraumatic.      Nose: Nose normal.      Mouth/Throat:      Mouth: Mucous membranes are moist.      Pharynx: Oropharynx is clear.   Eyes:      Extraocular Movements: Extraocular movements intact.      Conjunctiva/sclera: Conjunctivae normal.      Pupils: Pupils are equal, round, and reactive to light.   Neck:      Musculoskeletal: Normal range of motion and neck supple.   Cardiovascular:      Rate and Rhythm: Normal rate and regular rhythm.      Pulses: Normal pulses.      Heart sounds: Normal heart sounds.   Pulmonary:       Effort: Pulmonary effort is normal.      Breath sounds: Normal breath sounds. No rhonchi.   Abdominal:      General: Bowel sounds are normal. There is no distension.      Palpations: There is no mass.      Tenderness: There is no abdominal tenderness. There is no right CVA tenderness or left CVA tenderness.   Musculoskeletal:      Right lower leg: No edema.      Left lower leg: No edema.   Skin:     General: Skin is warm.      Coloration: Skin is not jaundiced.      Findings: No erythema or rash.   Neurological:      General: No focal deficit present.      Mental Status: She is alert and oriented to person, place, and time.      Cranial Nerves: No cranial nerve deficit.      Coordination: Coordination normal.   Psychiatric:         Mood and Affect: Mood normal.         Behavior: Behavior normal.         Thought Content: Thought content normal.         Judgment: Judgment normal.         Fluids    Intake/Output Summary (Last 24 hours) at 11/8/2020 1124  Last data filed at 11/7/2020 2000  Gross per 24 hour   Intake 560 ml   Output --   Net 560 ml       Laboratory  Recent Labs     11/05/20  1341 11/07/20  0040   WBC 7.9 6.1   RBC 4.63 3.76*   HEMOGLOBIN 13.6 11.2*   HEMATOCRIT 42.8 33.9*   MCV 92.4 90.2   MCH 29.4 29.8   MCHC 31.8* 33.0*   RDW 50.8* 47.8   PLATELETCT 338 263   MPV 10.4 10.6     Recent Labs     11/06/20  1818 11/07/20  0040 11/08/20  0245   SODIUM 134* 138 137   POTASSIUM 4.5 3.9 4.0   CHLORIDE 98 102 102   CO2 21 25 26   GLUCOSE 219* 154* 142*   BUN 68* 61* 43*   CREATININE 2.86* 2.47* 1.54*   CALCIUM 8.7 8.5 9.1         Recent Labs     11/05/20  1341   NTPROBNP 95           Imaging  Renal US reviewed    Assessment/Plan  1.TIMMY/CKD III -improving -to monitor  2.HTN: BP well controlled  3.Electrolytes: well controlled.  4.Anemia: drop in Hb level -to monitor  5.Metabolic acidosis corrected    Recs:  Keep well hydrated  Monitor BP  Low salt diet  Avoid nephrotoxins  F/u in Nephrology

## 2020-11-08 NOTE — DISCHARGE INSTRUCTIONS
Acute Kidney Injury, Adult    Acute kidney injury is a sudden worsening of kidney function. The kidneys are organs that have several jobs. They filter the blood to remove waste products and extra fluid. They also maintain a healthy balance of minerals and hormones in the body, which helps control blood pressure and keep bones strong. With this condition, your kidneys do not do their jobs as well as they should.  This condition ranges from mild to severe. Over time it may develop into long-lasting (chronic) kidney disease. Early detection and treatment may prevent acute kidney injury from developing into a chronic condition.  What are the causes?  Common causes of this condition include:  · A problem with blood flow to the kidneys. This may be caused by:  ? Low blood pressure (hypotension) or shock.  ? Blood loss.  ? Heart and blood vessel (cardiovascular) disease.  ? Severe burns.  ? Liver disease.  · Direct damage to the kidneys. This may be caused by:  ? Certain medicines.  ? A kidney infection.  ? Poisoning.  ? Being around or in contact with toxic substances.  ? A surgical wound.  ? A hard, direct hit to the kidney area.  · A sudden blockage of urine flow. This may be caused by:  ? Cancer.  ? Kidney stones.  ? An enlarged prostate in males.  What are the signs or symptoms?  Symptoms of this condition may not be obvious until the condition becomes severe. Symptoms of this condition can include:  · Tiredness (lethargy), or difficulty staying awake.  · Nausea or vomiting.  · Swelling (edema) of the face, legs, ankles, or feet.  · Problems with urination, such as:  ? Abdominal pain, or pain along the side of your stomach (flank).  ? Decreased urine production.  ? Decrease in the force of urine flow.  · Muscle twitches and cramps, especially in the legs.  · Confusion or trouble concentrating.  · Loss of appetite.  · Fever.  How is this diagnosed?  This condition may be diagnosed with tests, including:  · Blood  tests.  · Urine tests.  · Imaging tests.  · A test in which a sample of tissue is removed from the kidneys to be examined under a microscope (kidney biopsy).  How is this treated?  Treatment for this condition depends on the cause and how severe the condition is. In mild cases, treatment may not be needed. The kidneys may heal on their own. In more severe cases, treatment will involve:  · Treating the cause of the kidney injury. This may involve changing any medicines you are taking or adjusting your dosage.  · Fluids. You may need specialized IV fluids to balance your body's needs.  · Having a catheter placed to drain urine and prevent blockages.  · Preventing problems from occurring. This may mean avoiding certain medicines or procedures that can cause further injury to the kidneys.  In some cases treatment may also require:  · A procedure to remove toxic wastes from the body (dialysis or continuous renal replacement therapy - CRRT).  · Surgery. This may be done to repair a torn kidney, or to remove the blockage from the urinary system.  Follow these instructions at home:  Medicines  · Take over-the-counter and prescription medicines only as told by your health care provider.  · Do not take any new medicines without your health care provider's approval. Many medicines can worsen your kidney damage.  · Do not take any vitamin and mineral supplements without your health care provider's approval. Many nutritional supplements can worsen your kidney damage.  Lifestyle  · If your health care provider prescribed changes to your diet, follow them. You may need to decrease the amount of protein you eat.  · Achieve and maintain a healthy weight. If you need help with this, ask your health care provider.  · Start or continue an exercise plan. Try to exercise at least 30 minutes a day, 5 days a week.  · Do not use any tobacco products, such as cigarettes, chewing tobacco, and e-cigarettes. If you need help quitting, ask your  health care provider.  General instructions  · Keep track of your blood pressure. Report changes in your blood pressure as told by your health care provider.  · Stay up to date with immunizations. Ask your health care provider which immunizations you need.  · Keep all follow-up visits as told by your health care provider. This is important.  Where to find more information  · American Association of Kidney Patients: www.aakp.org  · National Kidney Foundation: www.kidney.org  · American Kidney Fund: www.akfinc.org  · Life Options Rehabilitation Program:  ? www.lifeoptions.org  ? www.kidneyschool.org  Contact a health care provider if:  · Your symptoms get worse.  · You develop new symptoms.  Get help right away if:  · You develop symptoms of worsening kidney disease, which include:  ? Headaches.  ? Abnormally dark or light skin.  ? Easy bruising.  ? Frequent hiccups.  ? Chest pain.  ? Shortness of breath.  ? End of menstruation in women.  ? Seizures.  ? Confusion or altered mental status.  ? Abdominal or back pain.  ? Itchiness.  · You have a fever.  · Your body is producing less urine.  · You have pain or bleeding when you urinate.  Summary  · Acute kidney injury is a sudden worsening of kidney function.  · Acute kidney injury can be caused by problems with blood flow to the kidneys, direct damage to the kidneys, and sudden blockage of urine flow.  · Symptoms of this condition may not be obvious until it becomes severe. Symptoms may include edema, lethargy, confusion, nausea or vomiting, and problems passing urine.  · This condition can usually be diagnosed with blood tests, urine tests, and imaging tests. Sometimes a kidney biopsy is done to diagnose this condition.  · Treatment for this condition often involves treating the underlying cause. It is treated with fluids, medicines, dialysis, diet changes, or surgery.  This information is not intended to replace advice given to you by your health care provider. Make  sure you discuss any questions you have with your health care provider.  Document Released: 07/02/2012 Document Revised: 11/30/2018 Document Reviewed: 12/08/2017  [employer] Patient Education © 2020 [employer]  Discharge Instructions    Discharged to home by car with relative. Discharged via wheelchair, hospital escort: Yes.  Special equipment needed: Not Applicable    Be sure to schedule a follow-up appointment with your primary care doctor or any specialists as instructed.     Discharge Plan:   Influenza Vaccine Indication: Patient Refuses(Pt has already taken this season.)    I understand that a diet low in cholesterol, fat, and sodium is recommended for good health. Unless I have been given specific instructions below for another diet, I accept this instruction as my diet prescription.   Other diet: Diabetic diet    Special Instructions: None    · Is patient discharged on Warfarin / Coumadin?   No           Depression / Suicide Risk    As you are discharged from this [facility] facility, it is important to learn how to keep safe from harming yourself.    Recognize the warning signs:  · Abrupt changes in personality, positive or negative- including increase in energy   · Giving away possessions  · Change in eating patterns- significant weight changes-  positive or negative  · Change in sleeping patterns- unable to sleep or sleeping all the time   · Unwillingness or inability to communicate  · Depression  · Unusual sadness, discouragement and loneliness  · Talk of wanting to die  · Neglect of personal appearance   · Rebelliousness- reckless behavior  · Withdrawal from people/activities they love  · Confusion- inability to concentrate     If you or a loved one observes any of these behaviors or has concerns about self-harm, here's what you can do:  · Talk about it- your feelings and reasons for harming yourself  · Remove any means that you might use to hurt yourself (examples: pills, rope, extension cords,  firearm)  · Get professional help from the community (Mental Health, Substance Abuse, psychological counseling)  · Do not be alone:Call your Safe Contact- someone whom you trust who will be there for you.  · Call your local CRISIS HOTLINE 319-7085 or 371-554-9247  · Call your local Children's Mobile Crisis Response Team [place] (781) 397-6836 or www.[employer]  · Call the toll free National Suicide Prevention Hotlines   · National Suicide Prevention Lifeline 200-028-WYXL (7390)  · National Hope Line Network 800-SUICIDE (884-7382)

## 2020-11-08 NOTE — CARE PLAN
Problem: Safety  Goal: Will remain free from injury  Outcome: PROGRESSING AS EXPECTED  Note: Treaded socks in place, bed in the lowest position, call light and belongings within reach, pt call for assistance appropriately      Problem: Knowledge Deficit  Goal: Knowledge of disease process/condition, treatment plan, diagnostic tests, and medications will improve  Outcome: PROGRESSING AS EXPECTED  Note: Educated on POC, all questions answered, hourly rounding in progress     Problem: Pain Management  Goal: Pain level will decrease to patient's comfort goal  Outcome: PROGRESSING AS EXPECTED  Note: VSS, denies pain at this time, hourly rounding in progress

## 2020-11-08 NOTE — PROGRESS NOTES
Pt a&o 4. On RA. Respirations equal and unlabored. Denies pain.  Ambulatory steady gait. Repositions self in bed.  Good po intake without any s/sx of aspiration noted. AGOSTO.  Plan of care reviewed including: labs, vitals frequency, fall precauitons. Verbalized understanding and agrees. White board updated. Instructed to use call light prior to getting oob to prevent falls. Able to make needs known.  Call light within reach.

## 2020-11-08 NOTE — DISCHARGE SUMMARY
Discharge Summary    CHIEF COMPLAINT ON ADMISSION  Chief Complaint   Patient presents with   • Sent by MD       Reason for Admission  Sent by RUBIO     Admission Date  11/5/2020    CODE STATUS  Full Code    HPI & HOSPITAL COURSE  65 y.o. female who presented 11/5/2020 with worsening fatigue and dizziness that has been progressing over the last 2 weeks. She reports that many of her medications were changed when she was discharged from the hospital in September. New medications from this discharge included hydralazine, HCTZ, spironolactone which were added to her Diltazem and losartan. On med rec today, her BP medications include amlodipine, olmesartan, spironolactone, and chlorthalidone. Unclear when this most recent change was made. In the ED, she was hemodynamically stable and afebrile. Saturating well on RA. Nephrology was consulted by the ERP as bicarb was 15, BUN 70 and Cr 3.55. She was placed on a bicarb drip and all Bp medications were held. Her Renal function improved after bicarb drip and oral intake. Suspected her BP was overcorrected as she was hypotensive upon arrival and she had hypoperfusion to her kidneys. She was stopped on bicarb drip and monitored for another night with PO challenge and renal function continued to improve. She will be discharged home and instructed to stop all BP medications except amlodipine as her BP here was been stable on no anti hypertensive medications. She is to montior her BP and follow up with Dr. Ventura and pcp in nex t week         Therefore, she is discharged in good and stable condition to home with close outpatient follow-up.    The patient met 2-midnight criteria for an inpatient stay at the time of discharge.    Discharge Date  11/8/20    FOLLOW UP ITEMS POST DISCHARGE  pcp   nephrology    DISCHARGE DIAGNOSES  Principal Problem (Resolved):    Acute kidney injury superimposed on chronic kidney disease (HCC) POA: Yes  Active Problems:    Metabolic acidosis POA: Yes     Pre-syncope POA: Yes    Type 2 diabetes mellitus with kidney complication, without long-term current use of insulin (HCC) POA: Yes    Essential hypertension POA: Yes    Obesity with serious comorbidity POA: Yes    UTI (urinary tract infection) POA: Unknown    Dyslipidemia POA: Yes  Resolved Problems:    Chronic left-sided low back pain with left-sided sciatica POA: Yes      FOLLOW UP  Future Appointments   Date Time Provider Department Center   11/11/2020  9:00 AM [facility]   11/13/2020  4:00 PM V [facility] 5 [facility] None   11/23/2020  2:20 PM Silvino Saldaña M.D. [facility] None     [facility]  [address]  [place] [address]  [phone]    In 1 week      Amie Ventura M.D.  [address]  [address]  [place] [address]  [phone]    In 1 week        MEDICATIONS ON DISCHARGE     Medication List      CONTINUE taking these medications      Instructions   Adult Aspirin Regimen 81 MG EC tablet  Generic drug: aspirin   Take 1 Tab by mouth every day.  Dose: 81 mg     amLODIPine 5 MG Tabs  Commonly known as: NORVASC   Take 1 Tab by mouth every day.  Dose: 5 mg     atorvastatin 40 MG Tabs  Commonly known as: LIPITOR   TAKE ONE TABLET BY MOUTH EVERY NIGHT AT BEDTIME     Blood Glucose Test Strips   Doctor's comments: Or per formulary preference. ICD-10 code: E11.65 Uncontrolled type 2 Diabetes Mellitus  Use one Claudette Contour Next strip to test blood sugar once daily .     Jardiance 10 MG Tabs  Generic drug: Empagliflozin   Take 1 tablet by mouth every day for 360 days.  Dose: 1 tablet     Lantus SoloStar 100 UNIT/ML Sopn injection  Generic drug: insulin glargine   Inject 7 Units as instructed every evening.  Dose: 7 Units     metformin 1000 MG tablet  Commonly known as: GLUCOPHAGE   Take 1 Tab by mouth 2 times a day, with meals.  Dose: 1,000 mg     TechLite Lancets Misc   Doctor's comments: Or per formulary preference. ICD-10 code: E11.65 Uncontrolled type 2 Diabetes Mellitus  Use one Claudette Contour  Next lancet to test blood sugar once daily .     Unifine Pentips  Generic drug: Insulin Pen Needle 32 G x 4 mm   Doctor's comments: Per patient/formulary preference. ICD-10 code: E11.65 Uncontrolled type 2 Diabetes Mellitus  Use one pen needle in pen device to inject insulin once daily.        STOP taking these medications    chlorthalidone 25 MG Tabs  Commonly known as: HYGROTON     Contour Next Test strip  Generic drug: glucose blood     hydrALAZINE 25 MG Tabs  Commonly known as: APRESOLINE     olmesartan 40 MG Tabs  Commonly known as: BENICAR     spironolactone 50 MG Tabs  Commonly known as: ALDACTONE            Allergies  Allergies   Allergen Reactions   • Ace Inhibitors Cough     cough   • Codeine Rash and Itching     Vicodin doesn't cause her any problems   • Metronidazole Vomiting and Nausea       DIET  Orders Placed This Encounter   Procedures   • Diet Order Diet: Consistent CHO (Diabetic)     Standing Status:   Standing     Number of Occurrences:   1     Order Specific Question:   Diet:     Answer:   Consistent CHO (Diabetic) [4]       ACTIVITY  As tolerated.  Weight bearing as tolerated    CONSULTATIONS  nephrology    PROCEDURES  none    LABORATORY  Lab Results   Component Value Date    SODIUM 137 11/08/2020    POTASSIUM 4.0 11/08/2020    CHLORIDE 102 11/08/2020    CO2 26 11/08/2020    GLUCOSE 142 (H) 11/08/2020    BUN 43 (H) 11/08/2020    CREATININE 1.54 (H) 11/08/2020        Lab Results   Component Value Date    WBC 6.1 11/07/2020    HEMOGLOBIN 11.2 (L) 11/07/2020    HEMATOCRIT 33.9 (L) 11/07/2020    PLATELETCT 263 11/07/2020        Total time of the discharge process exceeds 35 minutes.

## 2020-11-08 NOTE — THERAPY
Physical Therapy   Initial Evaluation     Patient Name: Brenda Davidson  Age:  65 y.o., Sex:  female  Medical Record #: [id number]  Today's Date: 11/8/2020          Assessment  Patient is 65 y.o. female admitted with fatigue and dizziness x2 weeks. Pt diagnosed with TIMMY likely due to recent medication change. Pt reported feeling much better than when admitted. Pt agreeable to work with therapy. Pt appears to be at her functional baseline without AD or assistance needed. No c/o dizziness throughout mobility, vitals stable. No further acute PT needs.    Plan    Recommend Physical Therapy for Evaluation only for the following treatments:  none    DC Equipment Recommendations: None  Discharge Recommendations: Anticipate that the patient will have no further physical therapy needs after discharge from the hospital          11/08/20 0901   Prior Living Situation   Prior Services None   Housing / Facility Mobile Home   Steps Into Home 2   Steps In Home 0   Equipment Owned None   Lives with - Patient's Self Care Capacity Alone and Able to Care For Self   Comments independent prior with dog at home   Prior Level of Functional Mobility   Bed Mobility Independent   Transfer Status Independent   Ambulation Independent   Distance Ambulation (Feet)   (community)   Assistive Devices Used None   Stairs Independent   Comments independent prior    Gait Analysis   Gait Level Of Assist Modified Independent   Assistive Device None   Distance (Feet) 120   # of Times Distance was Traveled 1   Deviation No deviation   # of Stairs Climbed 3   Level of Assist with Stairs Modified Independent   Weight Bearing Status fwv   Comments no assistance or verbal cues needed   Bed Mobility    Supine to Sit Modified Independent   Sit to Supine Modified Independent   Scooting Modified Independent   Functional Mobility   Sit to Stand Modified Independent   Bed, Chair, Wheelchair Transfer Modified Independent   Mobility on unit with no AD   Anticipated  Discharge Equipment and Recommendations   DC Equipment Recommendations None   Discharge Recommendations Anticipate that the patient will have no further physical therapy needs after discharge from the hospital

## 2020-11-10 LAB
ANCA IGG TITR SER IF: NORMAL {TITER}
NUCLEAR IGG SER QL IA: NORMAL

## 2020-11-11 ENCOUNTER — HOSPITAL ENCOUNTER (OUTPATIENT)
Dept: RADIOLOGY | Facility: MEDICAL CENTER | Age: 65
End: 2020-11-11
Attending: FAMILY MEDICINE
Payer: MEDICARE

## 2020-11-11 ENCOUNTER — NON-PROVIDER VISIT (OUTPATIENT)
Dept: MEDICAL GROUP | Facility: PHYSICIAN GROUP | Age: 65
End: 2020-11-11
Payer: MEDICARE

## 2020-11-11 VITALS — DIASTOLIC BLOOD PRESSURE: 82 MMHG | SYSTOLIC BLOOD PRESSURE: 150 MMHG

## 2020-11-11 DIAGNOSIS — I71.40 ABDOMINAL AORTIC ANEURYSM WITHOUT RUPTURE (HCC): ICD-10-CM

## 2020-11-11 NOTE — NON-PROVIDER
Brenda Cande Davidson is a 65 y.o. female here for a non-provider visit for BP check    If abnormal was an in office provider notified today (if so, indicate provider)? Yes  Routed to PCP? Yes

## 2020-11-12 ENCOUNTER — TELEPHONE (OUTPATIENT)
Dept: VASCULAR LAB | Facility: MEDICAL CENTER | Age: 65
End: 2020-11-12

## 2020-11-12 NOTE — TELEPHONE ENCOUNTER
"Pt had called yesterday \"Jeremias Seth     Pt called today with BP medication questions. She was in the hospital for the last 4 days. The admitting Dr took her off all her medications but amlodipine. She said she's feeling like her BP is going up since being taken off her meds. She went to [facility] urgent care earlier and her BP was 152/62.     Please call back.\"    Returned call, no answer. LM to call back.   "

## 2020-11-13 ENCOUNTER — APPOINTMENT (OUTPATIENT)
Dept: VASCULAR LAB | Facility: MEDICAL CENTER | Age: 65
End: 2020-11-13
Attending: INTERNAL MEDICINE
Payer: MEDICARE

## 2020-11-14 ENCOUNTER — HOSPITAL ENCOUNTER (OUTPATIENT)
Dept: RADIOLOGY | Facility: MEDICAL CENTER | Age: 65
End: 2020-11-14
Attending: FAMILY MEDICINE
Payer: MEDICARE

## 2020-11-14 ENCOUNTER — HOSPITAL ENCOUNTER (OUTPATIENT)
Dept: RADIOLOGY | Facility: MEDICAL CENTER | Age: 65
End: 2020-11-12
Attending: FAMILY MEDICINE
Payer: MEDICARE

## 2020-11-14 DIAGNOSIS — I71.40 ABDOMINAL AORTIC ANEURYSM WITHOUT RUPTURE (HCC): ICD-10-CM

## 2020-11-14 PROCEDURE — 74174 CTA ABD&PLVS W/CONTRAST: CPT

## 2020-11-14 PROCEDURE — 700117 HCHG RX CONTRAST REV CODE 255: Performed by: FAMILY MEDICINE

## 2020-11-14 RX ADMIN — IOHEXOL 100 ML: 350 INJECTION, SOLUTION INTRAVENOUS at 11:26

## 2020-11-16 ENCOUNTER — TELEPHONE (OUTPATIENT)
Dept: VASCULAR LAB | Facility: MEDICAL CENTER | Age: 65
End: 2020-11-16

## 2020-11-17 NOTE — TELEPHONE ENCOUNTER
CTA abd reviewed.   Mild AA ectasia, moderate L renal art stenosis, no signs of visceral aa stenosis.    Pending f/u to review further.   Plan for repeat renal art duplex in 1 yr.

## 2020-11-23 ENCOUNTER — OFFICE VISIT (OUTPATIENT)
Dept: VASCULAR LAB | Facility: MEDICAL CENTER | Age: 65
End: 2020-11-23
Attending: FAMILY MEDICINE
Payer: MEDICARE

## 2020-11-23 VITALS
WEIGHT: 205 LBS | DIASTOLIC BLOOD PRESSURE: 49 MMHG | BODY MASS INDEX: 32.18 KG/M2 | SYSTOLIC BLOOD PRESSURE: 128 MMHG | HEART RATE: 86 BPM | HEIGHT: 67 IN

## 2020-11-23 DIAGNOSIS — E11.22 TYPE 2 DIABETES MELLITUS WITH STAGE 3A CHRONIC KIDNEY DISEASE, WITH LONG-TERM CURRENT USE OF INSULIN (HCC): ICD-10-CM

## 2020-11-23 DIAGNOSIS — I51.7 MILD CONCENTRIC LEFT VENTRICULAR HYPERTROPHY (LVH): ICD-10-CM

## 2020-11-23 DIAGNOSIS — E55.9 VITAMIN D INSUFFICIENCY: ICD-10-CM

## 2020-11-23 DIAGNOSIS — E78.5 DYSLIPIDEMIA: ICD-10-CM

## 2020-11-23 DIAGNOSIS — Z79.4 TYPE 2 DIABETES MELLITUS WITH STAGE 3A CHRONIC KIDNEY DISEASE, WITH LONG-TERM CURRENT USE OF INSULIN (HCC): ICD-10-CM

## 2020-11-23 DIAGNOSIS — K55.1 SUPERIOR MESENTERIC ARTERY STENOSIS (HCC): ICD-10-CM

## 2020-11-23 DIAGNOSIS — N18.31 TYPE 2 DIABETES MELLITUS WITH STAGE 3A CHRONIC KIDNEY DISEASE, WITH LONG-TERM CURRENT USE OF INSULIN (HCC): ICD-10-CM

## 2020-11-23 DIAGNOSIS — N18.32 CKD STAGE G3B/A2, GFR 30-44 AND ALBUMIN CREATININE RATIO 30-299 MG/G: ICD-10-CM

## 2020-11-23 DIAGNOSIS — I10 ESSENTIAL HYPERTENSION: ICD-10-CM

## 2020-11-23 PROCEDURE — 99214 OFFICE O/P EST MOD 30 MIN: CPT | Performed by: FAMILY MEDICINE

## 2020-11-23 PROCEDURE — 99212 OFFICE O/P EST SF 10 MIN: CPT

## 2020-11-23 ASSESSMENT — ENCOUNTER SYMPTOMS
FEVER: 0
COUGH: 0
NAUSEA: 0
MYALGIAS: 0
NERVOUS/ANXIOUS: 0
FOCAL WEAKNESS: 0
VOMITING: 0
CHILLS: 0
ORTHOPNEA: 0
DIZZINESS: 0
SEIZURES: 0
PALPITATIONS: 0
SHORTNESS OF BREATH: 0
DEPRESSION: 0
TREMORS: 0
BRUISES/BLEEDS EASILY: 0
WEAKNESS: 0
INSOMNIA: 0
DOUBLE VISION: 0
HEMOPTYSIS: 0
BLURRED VISION: 0
HEADACHES: 0
SORE THROAT: 0
WHEEZING: 0
HEARTBURN: 0

## 2020-11-23 ASSESSMENT — FIBROSIS 4 INDEX: FIB4 SCORE: 0.64

## 2020-11-23 NOTE — PROGRESS NOTES
FOLLOW-UP VASCULAR VISIT  Subjective:   Brenda Davidson is a 65 y.o. y.o. female  who presents today 20 for   Chief Complaint   Patient presents with   • Office Visit     f/u HTN, SMA stenosis, CTA, HLD, T2D, labs     initially referred by Genoveva Roach A.P.N for eval and mgmt of HTN, HLD, mesenteric a stenosis      HPI:    Mesenteric artery stenosis:  No abd pain, CTA shows no significant mesenteric aa stenosis.   No prior bowel ischemic changes or hospitalizations in the past.   No prior abd surgeries or Endovasc tx.     HTN:  Current HTN concerns:  Recently d/c from [facility] on  due to hypotension, dizz, fatigue.  Noted to have TIMMY with high BUN, Cr climbed to 3.55 - presumed hypoperf kidneys.  Occurred after adjustments to BP meds after last visit with our office.  Reported poor hydration status at prior to admit.  At time of d/c advised to stop all BP meds except amlodipine.  She believes she may be taking another BP med still too - cannot recall name of med   Current ADRs: No  HTN sx:  No current blurred or changed vision, chest pain, shortness of breath, headache, nausea, dizziness/vertigo   Home BP los/70s.  Prior was <100/50 consistently   24h ABPM completed: not tested  Adherence to current HTN meds: compliant all of the time    Pertinent HTN hx (reviewed at initial visit):   Age at HTN dx: >5yrs   (if female, any hx of pregnancy-related HTN): no  Past HTN medications: acei - cough, no amlodipine   HTN crises:  2020 - o/w no  Lifestyle factors:  High salt: No    EtOH: No  Interfering substances/contributing factors:   NSAIDs/Rivera-2i: Yes, Details: meloxicam for foot issue     Hyperlipidemia:    Stable, no current concerns  Current treatment: High intensity statin   atorva   Myalgias? No  Other adverse drug reactions? No  Last lipid profile: reviewed with patient, as noted below     Antiplatelet/anticoagulation:   Yes, Details: ASA, no bleeding noted     T2D:  Stable. No current  symptoms reported.   Tolerating meds and no recent med changes.   Home blood sugars stable, reports no lows.   Last A1c   Lab Results   Component Value Date    HBA1C 8.7 (H) 11/03/2020      CKD:    Recent TIMMY due to renal hypoperfusion.    Has atrophic L kidney with moderate L renal art stenosis.    Had worsening Cr to 3.55.  Consulted with nephrology in hospital, pending o/p f/u with Dr. Ventura  Denies symptoms.      Clinical evidence of ASCVD:  None     Major ASCVD risk factors (3):    1) Age (Men>44, women>54):  yes   2) Fhx early CHD (MI, SCD, coronary revasc procedures in men <55, women <65):  no   3) cigarette smoking:   reports that she has never smoked. She has never used smokeless tobacco.   4) high BP >139/89 or on tx: yes   5) Low HDL-C (<40 men, <50 women): yes   HDL   Date Value Ref Range Status   09/11/2018 45 >=40 mg/dL Final      Current Outpatient Medications on File Prior to Visit   Medication Sig Dispense Refill   • amLODIPine (NORVASC) 5 MG Tab Take 1 Tab by mouth every day. 30 Tab 11   • insulin glargine (LANTUS SOLOSTAR) 100 UNIT/ML Solution Pen-injector injection Inject 7 Units as instructed every evening. 6 mL 0   • Empagliflozin (JARDIANCE) 10 MG Tab Take 1 tablet by mouth every day for 360 days. 90 Tab 3   • atorvastatin (LIPITOR) 40 MG Tab TAKE ONE TABLET BY MOUTH EVERY NIGHT AT BEDTIME 100 Tab 0   • Blood Glucose Test Strips Use one Claudette Contour Next strip to test blood sugar once daily . 100 Each 0   • Lancets Use one Claudette Contour Next lancet to test blood sugar once daily . 100 Each 0   • Insulin Pen Needle 32 G x 4 mm Use one pen needle in pen device to inject insulin once daily. 100 Each 0   • aspirin 81 MG EC tablet Take 1 Tab by mouth every day. 30 Tab 0   • metformin (GLUCOPHAGE) 1000 MG tablet Take 1 Tab by mouth 2 times a day, with meals. 200 Tab 3     No current facility-administered medications on file prior to visit.      Allergies   Allergen Reactions   • Ace Inhibitors Cough  "    cough   • Codeine Rash and Itching     Vicodin doesn't cause her any problems   • Metronidazole Vomiting and Nausea     Social History     Tobacco Use   • Smoking status: Never Smoker   • Smokeless tobacco: Never Used   Substance Use Topics   • Alcohol use: No     Alcohol/week: 0.0 oz     Frequency: Never     Binge frequency: Never   • Drug use: No     DIET AND EXERCISE:  Weight Change:stable   BMI Readings from Last 5 Encounters:   11/23/20 32.11 kg/m²   11/05/20 32.18 kg/m²   11/05/20 33.36 kg/m²   10/16/20 33.36 kg/m²   09/21/20 32.26 kg/m²      Diet: common adult, low CHO   Exercise: no regular exercise program   Review of Systems   Constitutional: Negative for chills, fever and malaise/fatigue.   HENT: Negative for nosebleeds, sore throat and tinnitus.    Eyes: Negative for blurred vision and double vision.   Respiratory: Negative for cough, hemoptysis, shortness of breath and wheezing.    Cardiovascular: Negative for chest pain, palpitations, orthopnea and leg swelling.   Gastrointestinal: Negative for abdominal pain, blood in stool, diarrhea, heartburn, melena, nausea and vomiting.   Genitourinary: Negative for dysuria and hematuria.   Musculoskeletal: Negative for joint pain and myalgias.   Skin: Negative for itching and rash.   Neurological: Negative for dizziness, tremors, focal weakness, seizures, weakness and headaches.   Endo/Heme/Allergies: Does not bruise/bleed easily.   Psychiatric/Behavioral: Negative for depression. The patient is not nervous/anxious and does not have insomnia.       Objective:     Vitals:    11/23/20 1435 11/23/20 1441   BP: 131/57 128/49   BP Location: Left arm Left arm   Patient Position: Sitting Sitting   BP Cuff Size: Adult Adult   Pulse: 86    Weight: 93 kg (205 lb)    Height: 1.702 m (5' 7\")       BP Readings from Last 5 Encounters:   11/23/20 128/49   11/11/20 150/82   11/08/20 126/76   11/05/20 124/82   10/16/20 134/59      Body mass index is 32.11 kg/m².     Physical " Exam  Vitals signs reviewed.   Constitutional:       Appearance: Normal appearance.   HENT:      Head: Normocephalic and atraumatic.      Nose: Nose normal.      Mouth/Throat:      Mouth: Mucous membranes are moist.      Pharynx: Oropharynx is clear.   Eyes:      Extraocular Movements: Extraocular movements intact.      Conjunctiva/sclera: Conjunctivae normal.   Neck:      Musculoskeletal: Normal range of motion and neck supple.   Cardiovascular:      Rate and Rhythm: Normal rate and regular rhythm.      Pulses: Normal pulses.           Carotid pulses are 2+ on the right side and 2+ on the left side.       Radial pulses are 2+ on the right side and 2+ on the left side.        Dorsalis pedis pulses are 2+ on the right side and 2+ on the left side.        Posterior tibial pulses are 2+ on the right side and 2+ on the left side.      Heart sounds: Normal heart sounds.      Comments:    Spider telangectasia:       RLE:  None      LLE: none   Varicosities:           RLE: none      LLE: none   Corona phlebectatica:      RLE:  None        LLE:  None   Cording:         RLE:  None     LLE: None     Pulmonary:      Effort: Pulmonary effort is normal.      Breath sounds: Normal breath sounds.   Abdominal:      General: Abdomen is flat. Bowel sounds are normal.      Palpations: Abdomen is soft.   Musculoskeletal:      Right lower leg: No edema.      Left lower leg: No edema.   Skin:     General: Skin is warm and dry.      Capillary Refill: Capillary refill takes less than 2 seconds.   Neurological:      General: No focal deficit present.      Mental Status: She is alert and oriented to person, place, and time. Mental status is at baseline.   Psychiatric:         Mood and Affect: Mood normal.         Behavior: Behavior normal.       Lab Results   Component Value Date    CHOLSTRLTOT 155 11/03/2020    LDL 70 11/03/2020    HDL 41 11/03/2020    TRIGLYCERIDE 218 (H) 11/03/2020           Lab Results   Component Value Date    HBA1C 8.7  (H) 2020      Lab Results   Component Value Date    SODIUM 137 2020    POTASSIUM 4.0 2020    CHLORIDE 102 2020    CO2 26 2020    GLUCOSE 142 (H) 2020    BUN 43 (H) 2020    CREATININE 1.54 (H) 2020    IFAFRICA 41 (A) 2020    IFNOTAFR 34 (A) 2020        Lab Results   Component Value Date    WBC 6.1 2020    RBC 3.76 (L) 2020    HEMOGLOBIN 11.2 (L) 2020    HEMATOCRIT 33.9 (L) 2020    MCV 90.2 2020    MCH 29.8 2020    MCHC 33.0 (L) 2020    MPV 10.6 2020      Ref. Range 11/3/2020 06:52   LDL Direct Latest Ref Range: 0 - 129 mg/dL 80   Lipoprotein A Latest Ref Range: <=29 mg/dL 8     VASCULAR IMAGING:   Last EKG:   Results for orders placed or performed during the hospital encounter of 20   EKG (NOW)   Result Value Ref Range    Report       [facility] Emergency Dept[facility]    Test Date:  2020  Pt Name:    NISHA GORDILLO                Department: ER  MRN:        [id number]                      Room:  Gender:     Female                       Technician: [id number]  :        [id number]                   Requested By:DEWAYNE YE  Order #:    [id number]                    Reading MD: DEWAYNE YE MD    Measurements  Intervals                                Axis  Rate:       71                           P:          44  HI:         168                          QRS:        5  QRSD:       90                           T:          11  QT:         388  QTc:        422    Interpretive Statements  SINUS RHYTHM  BASELINE WANDER IN LEAD(S) V6  Compared to ECG 2020 14:25:21  Left ventricular hypertrophy no longer present  T-wave abnormality no longer present  Electronically Signed On 2020 17:10:15 PST by DEWAYNE YE MD       CT abd/pelvis   Duplex left kidney with a bifid ureter and mild hydroureteronephrosis. No renal, ureteral or bladder calculi are identified.   Renal  cortical scarring on the left.   Hypodense right renal lesion is again noted likely a small cyst.   Hepatic steatosis with peripheral hyperenhancement likely related to ovarian perfusion.   Atherosclerotic plaque within an ectatic aorta.   Colonic diverticulosis.   Status post cholecystectomy and right hemicolectomy.    Echo 9/2020  No prior study is available for comparison.   Normal left ventricular chamber size.  Mild concentric left ventricular hypertrophy.  Left ventricular ejection fraction is visually estimated to be 60%.  Mild to moderate tricuspid regurgitation.  Right ventricular systolic pressure is estimated to be 45 mmHg.    JAISON duplex 9/3/20   No evidence of abdominal aortic aneurysm.    Elevated velocities are seen at the proximal superficial mesenteric artery    velocity consistent with 70-99% stenosis.    Velocities and waveforms are normal through the right renal artery.   The left renal artery is not seen from midline or flank.     CTA abd/pelvis 11/14/20  AORTA AND BRANCH VESSELS:  Minimal infrarenal aortic ectasia, with aorta measuring up to 2.1 cm. No evidence of aortic aneurysm or dissection. No intramural hematoma. Atherosclerosis.  Patent origins of the celiac, superior mesenteric and renal arteries. Moderate stenosis of the left renal artery origin. No significant celiac or SMA stenosis. Patent common iliac and visualized iliac and femoral.  1. Minimal infrarenal abdominal aortic ectasia. No aneurysm.  2. Moderate stenosis of the left renal artery origin.   No significant celiac or SMA stenosis.  3. Partially duplicated left renal collecting system, with mild left renal cortical scarring.    Medical Decision Making:  Today's Assessment / Status / Plan:     1. Essential hypertension     2. Superior mesenteric artery stenosis (HCC)     3. CKD stage G3b/A2, GFR 30-44 and albumin creatinine ratio  mg/g     4. Type 2 diabetes mellitus with stage 3a chronic kidney disease, with long-term  "current use of insulin (HCC)     5. Vitamin D insufficiency     6. Dyslipidemia     7. Mild concentric left ventricular hypertrophy (LVH)       Patient Type: Primary Prevention and DM    Etiology of Established CVD if Present:   1) SMA stenosis, mod - resolved per CTA   2) LVH  3) mod TR  4) pulm HTN  5) mod stenosis, L renal art   6) mild Abd Ao ectasia     Antithrombotic therapy:  Indication: SMA stenosis   Anti-Platelet/Anti-Coagulant Tx: yes  - continue ASA 81mg daily   - stop nsaids     Lipid Management: Qualifies for Statin Therapy Based on 2018 ACC/AHA Guidelines: yes  Calculated 10-Year Risk of ASCVD (if LDL <189, no CKD G3b/4/5): N/A  Currently on Statin: Yes  NLA/ACC/AHA risk category: Very high:  T2D with 2 or more RFs  Tx goal: non-HDL-C <100,  LDL-C <70  (optional: apoB <80)  At goal:  yes  Plan:  - reinforced ongoing TLC measures   - monitor labs Q3-12mo   - continue atorvastatin 40mg - least renally cleared   - add zetia if not at goal     Blood Pressure Management: Goal: ACC/AHA (2017) goal <130/80  Home BP at goal:  no  Office BP at goal:  No, SBP elevated only   Indications of end organ damage:   Echo/EKG: abnormal LVH    UACR: elevated   Renal parenchymal disease:  abnormal G3a baseline  Ophthalmo: pending    Device candidate? No  Suspicious for \"pressor kidney\" based upon severe hypotension after reduced renal perfusion due to aggressive BP mgmt.  Has returned back to normal on limited meds, and will need to monitor for return to possible stage II HTN  May need to consider formal renal arteriography in the future - currently CTA shows moderate Left renal art stenosis to mild atrophic L kidney.   Plan:   Monitoring:   - start/continue home BP monitoring, reviewed correct technique, provide BP log and instructions  - order 24h ABPM:  UNDECIDED  - monitor lytes/gfr routinely   - contact office if BP consistently >140/>90 to discussion of tx adjustments   Medications:  ACEi/ARB: stopped ARB due to " "TIMMY and hypotension   DHP-CCB: continue amlodipine 5mg daily  Thiazide: stopped chlorthalidone due to TIMMY  Faraz-receptor Antagonist: holding spironolactone   Peripheral Alpha Blocker: not indicated   Loop: not indicated   BB: not indicated   Non-DHP-CCB: stopped dilt to start amlodipine   Direct Vasodilator: may still be on hydralazine - will call with med list     Glycemic Status: Diabetic, type 2   Goal A1c < 7.0  Last A1c    Lab Results   Component Value Date    HBA1C 8.7 (H) 11/03/2020    UACR: A2  Plan:  - continue current medication plan  - ok to continue metformin 1g BID if gfr >30  - jardiance 10mg - will help with BP control and CKD as long as gfr >30  - glargine 7 Units QHS, increase 1 unit daily until consistently at  fasting AM  - recommmend for routine care with PCP (or endocrine) to include regular A1c monitoring, annual albumin/creatinine ratio (ACR), annual diabetic retinopathy screening, foot exams, annual flu vaccine, and updates to pneumonia vaccines as appropriate     Smoking:   reports that she has never smoked. She has never used smokeless tobacco.   - continued complete avoidance of all tobacco products     Physical Activity: continue healthy activity to improve CV fitness, see care instructions for additional details     Weight Management and Nutrition: Dietary plan was discussed with patient at this visit including DASH, low sodium and/or as outlined in care instructions     Other:     1) left renal atrophy, hx of renal cortical scarring - unclear etiology  CKD G3b/A2   Consider pressor kidney   L renal art moderate stenosis on CTA   Had TIMMY and is now improving   - repeat renal duplex in 1 yr (Nov)  - continue hydration, holding   - monitor gfr/lytes with ongoing care with nephrology (Dr. Ventura)  - consider PTRA if identified stenosis with lowering gfr and increased BP as may be \"pressor kidney\"  - unable to check faraz/renin due to spironolactone use     2) SMA stenosis, no findings on " CTA, no current symptoms    11/2020 - No indications of celiac or SMA stenosis on CTA   - no further w/u     3) possible pulm HTN per echo RVSP 45mmHg, no symptoms  Associated with mod TR   - consider repeat echo in 1 yr, eval with cardiology if worsening sx or pressures      Instructed to follow-up with PCP for remainder of adult medical needs: yes  We will partner with other providers in the management of established vascular disease and cardiometabolic risk factors.    Studies to Be Obtained: renal art duplex 11/2021 - not ordered, aprn to track   Labs to Be Obtained: as noted above     Follow up in: 3mo with aprn, pharmacotx DM clinic      Silvino Saldaña M.D.  [facility]   [facility]

## 2020-11-24 ENCOUNTER — TELEPHONE (OUTPATIENT)
Dept: VASCULAR LAB | Facility: MEDICAL CENTER | Age: 65
End: 2020-11-24

## 2020-11-24 ASSESSMENT — ENCOUNTER SYMPTOMS
ABDOMINAL PAIN: 0
BLOOD IN STOOL: 0
DIARRHEA: 0

## 2020-11-24 NOTE — TELEPHONE ENCOUNTER
Pt saw Dr. Saldaña yesterday and wanted to know what BP med she is on and it is hydralazine 25 mg. She states that she needs a refill on it. However it is not her medication list for me to refill and pend.

## 2020-11-25 ENCOUNTER — TELEPHONE (OUTPATIENT)
Dept: VASCULAR LAB | Facility: MEDICAL CENTER | Age: 65
End: 2020-11-25

## 2020-11-25 DIAGNOSIS — E11.29 TYPE 2 DIABETES MELLITUS WITH OTHER DIABETIC KIDNEY COMPLICATION, WITHOUT LONG-TERM CURRENT USE OF INSULIN (HCC): ICD-10-CM

## 2020-11-25 DIAGNOSIS — N18.32 CKD STAGE G3B/A2, GFR 30-44 AND ALBUMIN CREATININE RATIO 30-299 MG/G: ICD-10-CM

## 2020-11-25 NOTE — TELEPHONE ENCOUNTER
Received request via: Patient    Was the patient seen in the last year in this department? Yes LOV 09/21/2020    Does the patient have an active prescription (recently filled or refills available) for medication(s) requested? No

## 2020-11-25 NOTE — TELEPHONE ENCOUNTER
"Rolo - please schedule for \"f/u HTN, lab\" in about 3 weeks with APRN.  Please make sure patient aware there is a new non-fasting BMP ordered to complete prior to next visit to monitor her kidney function.  thanks  "

## 2020-11-27 RX ORDER — HYDRALAZINE HYDROCHLORIDE 25 MG/1
75 TABLET, FILM COATED ORAL EVERY 8 HOURS
Qty: 270 TAB | Refills: 0 | OUTPATIENT
Start: 2020-11-27

## 2020-11-27 NOTE — TELEPHONE ENCOUNTER
Requested Prescriptions     Pending Prescriptions Disp Refills   • Blood Glucose Test Strips 100 Each 3     Sig: Use one Claudette Contour Next strip to test blood sugar once daily .     Refused Prescriptions Disp Refills   • hydrALAZINE (APRESOLINE) 25 MG Tab 270 Tab 0     Sig: Take 3 Tabs by mouth every 8 hours.       JEANNE Kaminski.

## 2020-11-30 ENCOUNTER — OFFICE VISIT (OUTPATIENT)
Dept: MEDICAL GROUP | Facility: PHYSICIAN GROUP | Age: 65
End: 2020-11-30
Payer: MEDICARE

## 2020-11-30 ENCOUNTER — HOSPITAL ENCOUNTER (OUTPATIENT)
Facility: MEDICAL CENTER | Age: 65
End: 2020-11-30
Attending: NURSE PRACTITIONER
Payer: MEDICARE

## 2020-11-30 VITALS
WEIGHT: 209 LBS | SYSTOLIC BLOOD PRESSURE: 140 MMHG | TEMPERATURE: 97.6 F | DIASTOLIC BLOOD PRESSURE: 60 MMHG | HEART RATE: 88 BPM | HEIGHT: 67 IN | OXYGEN SATURATION: 97 % | BODY MASS INDEX: 32.8 KG/M2

## 2020-11-30 DIAGNOSIS — N30.01 ACUTE CYSTITIS WITH HEMATURIA: ICD-10-CM

## 2020-11-30 PROBLEM — N39.0 UTI (URINARY TRACT INFECTION): Status: RESOLVED | Noted: 2020-11-06 | Resolved: 2020-11-30

## 2020-11-30 LAB
APPEARANCE UR: NORMAL
BILIRUB UR STRIP-MCNC: NEGATIVE MG/DL
COLOR UR AUTO: NORMAL
GLUCOSE UR STRIP.AUTO-MCNC: 500 MG/DL
KETONES UR STRIP.AUTO-MCNC: NEGATIVE MG/DL
LEUKOCYTE ESTERASE UR QL STRIP.AUTO: NORMAL
NITRITE UR QL STRIP.AUTO: NEGATIVE
PH UR STRIP.AUTO: 5 [PH] (ref 5–8)
PROT UR QL STRIP: >=300 MG/DL
RBC UR QL AUTO: NORMAL
SP GR UR STRIP.AUTO: 1.02
UROBILINOGEN UR STRIP-MCNC: 0.2 MG/DL

## 2020-11-30 PROCEDURE — 87086 URINE CULTURE/COLONY COUNT: CPT

## 2020-11-30 PROCEDURE — 99214 OFFICE O/P EST MOD 30 MIN: CPT | Performed by: NURSE PRACTITIONER

## 2020-11-30 PROCEDURE — 99000 SPECIMEN HANDLING OFFICE-LAB: CPT | Performed by: NURSE PRACTITIONER

## 2020-11-30 PROCEDURE — 81002 URINALYSIS NONAUTO W/O SCOPE: CPT | Performed by: NURSE PRACTITIONER

## 2020-11-30 PROCEDURE — 87077 CULTURE AEROBIC IDENTIFY: CPT

## 2020-11-30 PROCEDURE — 87186 SC STD MICRODIL/AGAR DIL: CPT

## 2020-11-30 RX ORDER — NITROFURANTOIN 25; 75 MG/1; MG/1
100 CAPSULE ORAL 2 TIMES DAILY
Qty: 14 CAP | Refills: 0 | Status: SHIPPED
Start: 2020-11-30 | End: 2021-05-10

## 2020-11-30 RX ORDER — PHENAZOPYRIDINE HYDROCHLORIDE 200 MG/1
200 TABLET, FILM COATED ORAL 3 TIMES DAILY PRN
Qty: 6 TAB | Refills: 0 | Status: SHIPPED
Start: 2020-11-30 | End: 2021-05-10

## 2020-11-30 ASSESSMENT — FIBROSIS 4 INDEX: FIB4 SCORE: 0.64

## 2020-12-01 DIAGNOSIS — N30.01 ACUTE CYSTITIS WITH HEMATURIA: ICD-10-CM

## 2020-12-01 NOTE — ASSESSMENT & PLAN NOTE
Pt noticed blood in her urine 3 days ago. Blood has come and gone since then. Patient reports there is an abnormal smell with associated dysuria, urgency, and frequency. She denies back pain, fevers, or nausea/vomiting. Had previous UTI the beginning of November and in October. She received IV antibiotics at that time. Patient is currently taking Jardiance and she states that her blood sugars have not been well controled.   POCT urine shows large blood, + leukocytes. + glucose

## 2020-12-01 NOTE — PROGRESS NOTES
Chief Complaint   Patient presents with   • Blood in Urine     and odor        Subjective:   Brenda Davidson is a 65 y.o. female here today for evaluation and management of:    Acute cystitis with hematuria  Pt noticed blood in her urine 3 days ago. Blood has come and gone since then. Patient reports there is an abnormal smell with associated dysuria, urgency, and frequency. She denies back pain, fevers, or nausea/vomiting. Had previous UTI the beginning of November and in October. She received IV antibiotics at that time. Patient is currently taking Jardiance and she states that her blood sugars have not been well controled.   POCT urine shows large blood, + leukocytes. + glucose        Current medicines (including changes today)  Current Outpatient Medications   Medication Sig Dispense Refill   • nitrofurantoin (MACROBID) 100 MG Cap Take 1 Cap by mouth 2 times a day. 14 Cap 0   • phenazopyridine (PYRIDIUM) 200 MG Tab Take 1 Tab by mouth 3 times a day as needed. 6 Tab 0   • Blood Glucose Test Strips Use one Claudette Contour Next strip to test blood sugar once daily . 100 Each 3   • amLODIPine (NORVASC) 5 MG Tab Take 1 Tab by mouth every day. 30 Tab 11   • insulin glargine (LANTUS SOLOSTAR) 100 UNIT/ML Solution Pen-injector injection Inject 7 Units as instructed every evening. 6 mL 0   • Empagliflozin (JARDIANCE) 10 MG Tab Take 1 tablet by mouth every day for 360 days. 90 Tab 3   • atorvastatin (LIPITOR) 40 MG Tab TAKE ONE TABLET BY MOUTH EVERY NIGHT AT BEDTIME 100 Tab 0   • Lancets Use one Claudette Contour Next lancet to test blood sugar once daily . 100 Each 0   • Insulin Pen Needle 32 G x 4 mm Use one pen needle in pen device to inject insulin once daily. 100 Each 0   • aspirin 81 MG EC tablet Take 1 Tab by mouth every day. 30 Tab 0   • metformin (GLUCOPHAGE) 1000 MG tablet Take 1 Tab by mouth 2 times a day, with meals. 200 Tab 3     No current facility-administered medications for this visit.      She  has a past  "medical history of Acute left-sided low back pain without sciatica (2/24/2020), Anesthesia, Chronic low back pain, Dental disorder, DM (diabetes mellitus) ([facility]), Hospital discharge follow-up (9/23/2020), Hypercholesteremia, Hypertension, Mass of cecum (9/15), Obesity (BMI 30.0-34.9), and Other specified disorder of intestines. She also has no past medical history of Breast cancer (HCC).    ROS As stated in HPI  No chest pain, no shortness of breath, no abdominal pain       Objective:     /60 (BP Location: Left arm, Patient Position: Sitting)   Pulse 88   Temp 36.4 °C (97.6 °F) (Temporal)   Ht 1.702 m (5' 7\")   Wt 94.8 kg (209 lb)   SpO2 97%  Body mass index is 32.73 kg/m².   Physical Exam:  Constitutional: Alert, no distress.  Skin: Warm, dry, good turgor,no cyanosis, no rashes in visible areas.  Eye: Equal, round and reactive, conjunctiva clear, lids normal.  Ears: No tenderness, no discharge.  External canals are without any significant edema or erythema.    Neck: Trachea midline, no masses, no obvious thyroid enlargement.. No cervical or supraclavicular lymphadenopathy. Range of motion within normal limits.  Neuro: Cranial nerves 2-12 grossly intact.  No sensory deficit.  Respiratory: Unlabored respiratory effort, lungs clear to auscultation, no wheezes, no ronchi.  Cardiovascular: Normal S1, S2, no murmur, no edema.  MSK: No CVA tenderness  Psych: Alert and oriented x3, normal affect and mood and judgement.        Assessment and Plan:   The following treatment plan was discussed    1. Acute cystitis with hematuria  New problem. Start macrobid and pyridium. Culture urine. Discussed bladder hygiene. Encouraged to increase fluids. Has appointment with diabetic pharmacist tomorrow through her vascular physician. Encouraged her to discuss her frequent UTIs at that appointment. Copy of UA sent with patient to share with pharmacist.   Recheck with PCP in 2 weeks to recheck urine.   - POCT Urinalysis  - URINE " CULTURE(NEW); Future      Followup: Return in about 2 weeks with PCP for follow up UA (around 12/14/2020), or sooner if symptoms worsen or fail to improve.         Educated in proper administration of medication(s) ordered today including safety, possible SE, risks, benefits, rationale and alternatives to therapy.     Please note that this dictation was created using voice recognition software. I have made every reasonable attempt to correct obvious errors, but I expect that there are errors of grammar and possibly content that I did not discover before finalizing the note.

## 2020-12-02 ENCOUNTER — TELEPHONE (OUTPATIENT)
Dept: VASCULAR LAB | Facility: MEDICAL CENTER | Age: 65
End: 2020-12-02

## 2020-12-07 DIAGNOSIS — I10 ESSENTIAL HYPERTENSION: ICD-10-CM

## 2020-12-07 RX ORDER — AMLODIPINE BESYLATE 5 MG/1
5 TABLET ORAL DAILY
Qty: 30 TAB | Refills: 11 | Status: SHIPPED | OUTPATIENT
Start: 2020-12-07 | End: 2021-01-04 | Stop reason: SDUPTHER

## 2020-12-08 NOTE — TELEPHONE ENCOUNTER
"Pt also would like her hydroCHLOROthiazide (HYDRODIURIL) 25 MG Tab refilled.       \"Direct Vasodilator: may still be on hydralazine - will call with med list \" was in Dr Saldaña last office note with pt      "

## 2020-12-09 ENCOUNTER — TELEPHONE (OUTPATIENT)
Dept: VASCULAR LAB | Facility: MEDICAL CENTER | Age: 65
End: 2020-12-09

## 2020-12-10 ENCOUNTER — TELEPHONE (OUTPATIENT)
Dept: MEDICAL GROUP | Facility: PHYSICIAN GROUP | Age: 65
End: 2020-12-10

## 2020-12-10 NOTE — TELEPHONE ENCOUNTER
Future Appointments       Provider Department Center    12/14/2020 3:40 PM FILI Kaminski [facility]    2/23/2021 11:20 AM VASCULAR NURSE PRACTITIONER [facility]          ESTABLISHED PATIENT PRE-VISIT PLANNING     Patient was NOT contacted to complete PVP.    1.  Reviewed notes from the last few office visits within the medical group: Yes    2.  If any orders were placed at last visit or intended to be done for this visit (i.e. 6 mos follow-up), do we have Results/Consult Notes?        •  Labs - Labs ordered, completed on 11/30/2020 and results are in chart.       •  Imaging - Imaging was not ordered at last office visit.       •  Referrals - No referrals were ordered at last office visit.    3. Is this appointment scheduled as a Hospital Follow-Up? No    4.  Immunizations were updated in Decalog using WebIZ?: Yes       •  Web Iz Recommendations: TDAP, VARICELLA (Chicken Pox)  and SHINGRIX (Shingles)    5.  Patient is due for the following Health Maintenance Topics:   Health Maintenance Due   Topic Date Due   • HEPATITIS C SCREENING  1955   • RETINAL SCREENING  10/13/2015   • MAMMOGRAM  06/29/2016   • BONE DENSITY  05/14/2019     6. Orders for overdue Health Maintenance topics pended in Pre-Charting? NO    7.  AHA (MDX) form printed for Provider? No, already completed    8.  Patient was NOT informed to arrive 15 min prior to their scheduled appointment and bring in their medication bottles.

## 2020-12-23 ENCOUNTER — TELEPHONE (OUTPATIENT)
Dept: VASCULAR LAB | Facility: MEDICAL CENTER | Age: 65
End: 2020-12-23

## 2020-12-23 NOTE — LETTER
[address]  [place] [address]        Dear Brenda Davidson ,    We have been unsuccessful in our attempts to contact you regarding your Diabetes Clinical Pharmacist referral.  Please contact us if you would like to schedule an appointment for help managing your Diabetes.    Please contact our clinic so we may assist you.  We are open Monday-Friday 8 am until 5 pm.  You may reach our Service at [phone].        Sincerely,       Silvino Brown PharmD, [facility]  Pharmacy Clinical Supervisor  [facility]  Outpatient Ambulatory Care Service

## 2020-12-23 NOTE — TELEPHONE ENCOUNTER
Pt has cancelled/no showed last two visits for DM.  Phone is out of service  Letter sent     Trudi Mireles, Clinical Pharmacist, CDE, CACP

## 2020-12-28 ENCOUNTER — TELEPHONE (OUTPATIENT)
Dept: VASCULAR LAB | Facility: MEDICAL CENTER | Age: 65
End: 2020-12-28

## 2020-12-29 NOTE — TELEPHONE ENCOUNTER
Pt called clinic requesting a medication refill but didn't mention which one. I called pt back to ask but had to leave a voicemail.

## 2021-01-04 ENCOUNTER — TELEPHONE (OUTPATIENT)
Dept: VASCULAR LAB | Facility: MEDICAL CENTER | Age: 66
End: 2021-01-04

## 2021-01-04 DIAGNOSIS — I10 ESSENTIAL HYPERTENSION: ICD-10-CM

## 2021-01-04 DIAGNOSIS — E11.22 TYPE 2 DIABETES MELLITUS WITH STAGE 3A CHRONIC KIDNEY DISEASE, WITH LONG-TERM CURRENT USE OF INSULIN (HCC): ICD-10-CM

## 2021-01-04 DIAGNOSIS — Z79.4 TYPE 2 DIABETES MELLITUS WITH STAGE 3A CHRONIC KIDNEY DISEASE, WITH LONG-TERM CURRENT USE OF INSULIN (HCC): ICD-10-CM

## 2021-01-04 DIAGNOSIS — N18.31 TYPE 2 DIABETES MELLITUS WITH STAGE 3A CHRONIC KIDNEY DISEASE, WITH LONG-TERM CURRENT USE OF INSULIN (HCC): ICD-10-CM

## 2021-01-04 RX ORDER — HYDRALAZINE HYDROCHLORIDE 25 MG/1
TABLET, FILM COATED ORAL
Qty: 90 TAB | Refills: 5 | Status: SHIPPED
Start: 2021-01-04 | End: 2021-05-10

## 2021-01-04 RX ORDER — EMPAGLIFLOZIN 10 MG/1
1 TABLET, FILM COATED ORAL DAILY
Qty: 30 TAB | Refills: 5 | Status: SHIPPED | OUTPATIENT
Start: 2021-01-04 | End: 2021-08-17

## 2021-01-04 RX ORDER — AMLODIPINE BESYLATE 5 MG/1
5 TABLET ORAL 2 TIMES DAILY
Qty: 60 TAB | Refills: 5 | Status: SHIPPED | OUTPATIENT
Start: 2021-01-04

## 2021-01-04 NOTE — TELEPHONE ENCOUNTER
LM to call back in regards to rx amlodipine. Wanting her dose to be upped but also needs a refill.

## 2021-01-05 DIAGNOSIS — I10 ESSENTIAL HYPERTENSION: ICD-10-CM

## 2021-01-05 RX ORDER — SPIRONOLACTONE 25 MG/1
TABLET ORAL
Qty: 90 TAB | Refills: 0 | OUTPATIENT
Start: 2021-01-05

## 2021-01-05 NOTE — TELEPHONE ENCOUNTER
Received request via: Pharmacy    Was the patient seen in the last year in this department? Yes  Last office visit 11/30/2020    Does the patient have an active prescription (recently filled or refills available) for medication(s) requested? No     Spironolactone was discontinued by Silvino Saldaña MD, on 10/16/2020.

## 2021-01-15 ENCOUNTER — TELEPHONE (OUTPATIENT)
Dept: VASCULAR LAB | Facility: MEDICAL CENTER | Age: 66
End: 2021-01-15

## 2021-01-15 NOTE — TELEPHONE ENCOUNTER
[facility] Pharmacotherapy [facility]    Received DM referral from Dr. Saldaña on 10/16/20    LVM    Insurance: SCP  PCP: [facility]  Locations to be seen: [address]    [facility]ticoagulation/Pharmacotherapy [facility] at [phone], fax [phone]    Lobo Garcia, MichaelD

## 2021-01-18 DIAGNOSIS — E78.5 DYSLIPIDEMIA: ICD-10-CM

## 2021-01-18 RX ORDER — ATORVASTATIN CALCIUM 40 MG/1
40 TABLET, FILM COATED ORAL
Qty: 100 TAB | Refills: 2 | Status: SHIPPED | OUTPATIENT
Start: 2021-01-18 | End: 2021-08-18 | Stop reason: SDUPTHER

## 2021-01-18 NOTE — TELEPHONE ENCOUNTER
Requested Prescriptions     Pending Prescriptions Disp Refills   • atorvastatin (LIPITOR) 40 MG Tab [Pharmacy Med Name: ATORVASTATIN 40 MG TABLET] 100 Tab 2     Sig: Take 1 Tab by mouth every bedtime.       JEANNE Kaminski.

## 2021-01-18 NOTE — TELEPHONE ENCOUNTER
Received request via: Pharmacy    Was the patient seen in the last year in this department? Yes 11/30/20    Does the patient have an active prescription (recently filled or refills available) for medication(s) requested? No

## 2021-02-02 ENCOUNTER — TELEPHONE (OUTPATIENT)
Dept: VASCULAR LAB | Facility: MEDICAL CENTER | Age: 66
End: 2021-02-02

## 2021-02-02 NOTE — TELEPHONE ENCOUNTER
Pt has cancelled/no-showed her last two DM visits with the pharmacists.  Pt has not responded to multiple calls to reschedule. Pt has not responded to letter.  Left VM message with contact information  2nd Letter sent  Trudi Mireles, Clinical Pharmacist, CDE, CACP

## 2021-02-02 NOTE — LETTER
[address]  [place] [address]        Dear Brenda Davidson ,    We have been unsuccessful in our attempts to contact you regarding your Diabetes Clinical Pharmacist referral.  Please contact us if you would like to schedule an appointment for help managing your Diabetes.    Please contact our clinic so we may assist you.  We are open Monday-Friday 8 am until 5 pm.  You may reach our Service at [phone].        Sincerely,       Silvino Brown PharmD, [facility]  Pharmacy Clinical Supervisor  [facility]  Outpatient Ambulatory Care Service

## 2021-02-17 ENCOUNTER — TELEPHONE (OUTPATIENT)
Dept: VASCULAR LAB | Facility: MEDICAL CENTER | Age: 66
End: 2021-02-17

## 2021-02-17 NOTE — TELEPHONE ENCOUNTER
[facility]     Received DM referral from Dr. Saldaña on 10/16/20     LVM     Insurance: SCP  PCP: [facility]  Locations to be seen: [address]     [facility] at [phone], fax [phone]     Lobo Garcia, MichaelD

## 2021-02-23 ENCOUNTER — TELEPHONE (OUTPATIENT)
Dept: VASCULAR LAB | Facility: MEDICAL CENTER | Age: 66
End: 2021-02-23

## 2021-02-24 ENCOUNTER — TELEPHONE (OUTPATIENT)
Dept: VASCULAR LAB | Facility: MEDICAL CENTER | Age: 66
End: 2021-02-24

## 2021-02-24 NOTE — TELEPHONE ENCOUNTER
[facility]    Received pharmacotherapy referral for DMII management from Dr. Saldaña on 10-16-20    Patient has no-showed multiple appts, unresponsive to calls and letters.  He has also no-showed vascular appts.  Pending further contact will close referral secondary to noncompliance/unwillingness to establish care.    [facility] Anticoagulation/Pharmacotherapy Clinic at [phone], fax [phone]    José Manuel Gonzalez, PharmD, BCACP

## 2021-02-24 NOTE — TELEPHONE ENCOUNTER
No show for vasc med visit today.  Will ask ma to call and r/s    Michael Bloch, MD  Vascular Care

## 2021-03-02 ENCOUNTER — TELEPHONE (OUTPATIENT)
Dept: VASCULAR LAB | Facility: MEDICAL CENTER | Age: 66
End: 2021-03-02

## 2021-03-03 DIAGNOSIS — Z23 NEED FOR VACCINATION: ICD-10-CM

## 2021-04-02 ENCOUNTER — TELEPHONE (OUTPATIENT)
Dept: VASCULAR LAB | Facility: MEDICAL CENTER | Age: 66
End: 2021-04-02

## 2021-05-10 ENCOUNTER — HOSPITAL ENCOUNTER (OUTPATIENT)
Facility: MEDICAL CENTER | Age: 66
End: 2021-05-10
Attending: NURSE PRACTITIONER
Payer: MEDICARE

## 2021-05-10 ENCOUNTER — TELEPHONE (OUTPATIENT)
Dept: VASCULAR LAB | Facility: MEDICAL CENTER | Age: 66
End: 2021-05-10

## 2021-05-10 ENCOUNTER — OFFICE VISIT (OUTPATIENT)
Dept: MEDICAL GROUP | Facility: PHYSICIAN GROUP | Age: 66
End: 2021-05-10
Payer: MEDICARE

## 2021-05-10 VITALS
WEIGHT: 198 LBS | BODY MASS INDEX: 31.08 KG/M2 | DIASTOLIC BLOOD PRESSURE: 82 MMHG | HEART RATE: 67 BPM | TEMPERATURE: 98.2 F | OXYGEN SATURATION: 94 % | SYSTOLIC BLOOD PRESSURE: 134 MMHG | HEIGHT: 67 IN

## 2021-05-10 DIAGNOSIS — N30.01 ACUTE CYSTITIS WITH HEMATURIA: ICD-10-CM

## 2021-05-10 DIAGNOSIS — K55.1 SUPERIOR MESENTERIC ARTERY STENOSIS (HCC): ICD-10-CM

## 2021-05-10 DIAGNOSIS — E66.9 CLASS 1 OBESITY WITH SERIOUS COMORBIDITY IN ADULT, UNSPECIFIED BMI, UNSPECIFIED OBESITY TYPE: ICD-10-CM

## 2021-05-10 DIAGNOSIS — E11.22 TYPE 2 DIABETES MELLITUS WITH STAGE 3B CHRONIC KIDNEY DISEASE, WITHOUT LONG-TERM CURRENT USE OF INSULIN (HCC): ICD-10-CM

## 2021-05-10 DIAGNOSIS — N89.8 VAGINAL ITCHING: ICD-10-CM

## 2021-05-10 DIAGNOSIS — I10 ESSENTIAL HYPERTENSION: ICD-10-CM

## 2021-05-10 DIAGNOSIS — N18.32 STAGE 3B CHRONIC KIDNEY DISEASE: ICD-10-CM

## 2021-05-10 DIAGNOSIS — Z79.4 LONG TERM (CURRENT) USE OF INSULIN (HCC): ICD-10-CM

## 2021-05-10 DIAGNOSIS — E78.5 DYSLIPIDEMIA: ICD-10-CM

## 2021-05-10 DIAGNOSIS — N18.32 TYPE 2 DIABETES MELLITUS WITH STAGE 3B CHRONIC KIDNEY DISEASE, WITHOUT LONG-TERM CURRENT USE OF INSULIN (HCC): ICD-10-CM

## 2021-05-10 DIAGNOSIS — E55.9 VITAMIN D INSUFFICIENCY: ICD-10-CM

## 2021-05-10 DIAGNOSIS — Z23 NEED FOR VACCINATION: ICD-10-CM

## 2021-05-10 LAB
APPEARANCE UR: NORMAL
BILIRUB UR STRIP-MCNC: NEGATIVE MG/DL
COLOR UR AUTO: NORMAL
GLUCOSE UR STRIP.AUTO-MCNC: 500 MG/DL
KETONES UR STRIP.AUTO-MCNC: NEGATIVE MG/DL
LEUKOCYTE ESTERASE UR QL STRIP.AUTO: NORMAL
NITRITE UR QL STRIP.AUTO: NEGATIVE
PH UR STRIP.AUTO: 5 [PH] (ref 5–8)
PROT UR QL STRIP: 100 MG/DL
RBC UR QL AUTO: NORMAL
SP GR UR STRIP.AUTO: 1.02
UROBILINOGEN UR STRIP-MCNC: 0.2 MG/DL

## 2021-05-10 PROCEDURE — 87077 CULTURE AEROBIC IDENTIFY: CPT

## 2021-05-10 PROCEDURE — 87086 URINE CULTURE/COLONY COUNT: CPT

## 2021-05-10 PROCEDURE — 87186 SC STD MICRODIL/AGAR DIL: CPT

## 2021-05-10 PROCEDURE — 81002 URINALYSIS NONAUTO W/O SCOPE: CPT | Performed by: NURSE PRACTITIONER

## 2021-05-10 PROCEDURE — 99214 OFFICE O/P EST MOD 30 MIN: CPT | Performed by: NURSE PRACTITIONER

## 2021-05-10 RX ORDER — LOSARTAN POTASSIUM 100 MG/1
TABLET ORAL
COMMUNITY
Start: 2021-02-21 | End: 2021-06-04

## 2021-05-10 RX ORDER — PHENAZOPYRIDINE HYDROCHLORIDE 200 MG/1
200 TABLET, FILM COATED ORAL 3 TIMES DAILY PRN
Qty: 6 TABLET | Refills: 0 | Status: SHIPPED | OUTPATIENT
Start: 2021-05-10 | End: 2021-06-08

## 2021-05-10 RX ORDER — NITROFURANTOIN 25; 75 MG/1; MG/1
100 CAPSULE ORAL 2 TIMES DAILY
Qty: 10 CAPSULE | Refills: 0 | Status: SHIPPED | OUTPATIENT
Start: 2021-05-10 | End: 2021-05-15

## 2021-05-10 RX ORDER — FLUCONAZOLE 150 MG/1
150 TABLET ORAL DAILY
Qty: 1 TABLET | Refills: 0 | Status: SHIPPED | OUTPATIENT
Start: 2021-05-10 | End: 2021-05-11

## 2021-05-10 ASSESSMENT — PATIENT HEALTH QUESTIONNAIRE - PHQ9: CLINICAL INTERPRETATION OF PHQ2 SCORE: 0

## 2021-05-10 ASSESSMENT — FIBROSIS 4 INDEX: FIB4 SCORE: 0.64

## 2021-05-10 NOTE — TELEPHONE ENCOUNTER
LM for pt to call us back.   ----- Message from Michael J Bloch, M.D. sent at 5/7/2021 11:23 AM PDT -----  Regarding: RE: Rn-s 2-23 apn vasc f/u  Rolo. Can you try her one more time  ----- Message -----  From: Mira Marino, Med Ass't  Sent: 4/2/2021  11:35 AM PDT  To: Michael J Bloch, M.D.  Subject: RE: Rn-s 2-23 apn vasc f/u                       LM again for pt.   ----- Message -----  From: Michael J Bloch, M.D.  Sent: 4/1/2021   4:49 PM PDT  To: Michael J Bloch, M.D., Vascular Medicine Ma  Subject: RE: Rn-s 2-23 apn vasc f/u                       Pls try again to r/s with meet or miriam  ----- Message -----  From: Michael J Bloch, M.D.  Sent: 2/23/2021  11:11 PM PDT  To: Michael J Bloch, M.D., Vascular Medicine Ma  Subject: Rn-s 2-23 apn vasc f/u                           Patient was a no show for APN vasc visit today.   Pls call and r/s with meet or miriam.   Let me know if unable after a few attempts over a couple of weeks    Thx  ----- Message -----  From: Michael J Bloch, M.D.  Sent: 12/17/2020   6:15 PM PST  To: Michael J Bloch, M.D.  Subject: 2-23 apn vasc f/u                                  ----- Message -----  From: Silvino Saldaña M.D.  Sent: 12/9/2020  10:54 AM PST  To: Michael J Bloch, M.D.  Subject: FW: needs dec f/u                                FYI   ----- Message -----  From: Mira Marino, Med Ass't  Sent: 12/9/2020  10:23 AM PST  To: Silvino Saldaña M.D.  Subject: RE: needs dec f/u                                Called pt and LM. I will try again on Friday.   ----- Message -----  From: Silvino Saldaña M.D.  Sent: 12/9/2020   9:24 AM PST  To: Mira Marino, Med Ass't, #  Subject: FW: needs dec f/u                                Rolo - please continue to contact patient about getting labs and another appointemnt set up.  Looks like you contacted her on 12/2 and there has been no response.  Please be persistent on this.  Thanks   ----- Message -----  From: Michael J Bloch,  "M.D.  Sent: 12/7/2020   7:07 PM PST  To: Silvino Saldaña M.D., Michael J Bloch, M.D.  Subject: RE: needs dec f/u                                Did you talk to [facility] about getting this one rescheduled?    mb  ----- Message -----  From: Silvino Saldaña M.D.  Sent: 11/25/2020  12:31 PM PST  To: Michael J Bloch, M.D.  Subject: RE: needs dec f/u                                I do these types of med adjustments consistently with many folks with similar labs and renal function and this is the 1st I've seen bottom out like this and go that deep into TIMMY, so I did view this as an exception to my normal practice and wanted to see if there was more to the story - aka \"pressor kidney\".      I'll most definitely watch the gfr more closely on my resistant HTN people (it's a good learning reminder and thanks for that).     As for elytes/renal function monitoring, she is seeing supposed to be seeing nephrology, and I defer to them for that but see the point of ordering something to cover the bases until they assume care and surveillance for her.  I understand the need to monitor that but I don't want to double-duty nephrology.      Thanks for updates on need for PTRA in the future   Appreciate the review and helping with this one.      I'm out of appointments due to limited schedule, but I'll have [facility] schedule to APRN in a couple weeks with pre-visit BMP.     ----- Message -----  From: Michael J Bloch, M.D.  Sent: 11/25/2020  12:10 PM PST  To: Silvino Saldaña M.D., Michael J Bloch, M.D.  Subject: needs dec f/u                                    I think this is definitely a good learning opportunity, not about renal artery stenosis per se but more about medical management    When you saw patient in October just before she was admitted she already had a drop in GFR from greater than 60 to 46 in a month, and you instituted all the following changes at once -  1) change from losartan to olmesartan  2) change from " "hydrochlorothiazide to chlorthalidone  3) increase in spironolactone dose    Of course it is obvious in retrospect, but particularly patient with declining renal function need to be judicious in increasing medications that can drop preload and increase azotemia.  I would have gone much more slowly and maybe even made sure that her renal function was stabilizing before making any more than 1 of those changes at a time    This really does seem that there is some degree of renal artery stenosis playing a role in her hemodynamics, but that does not necessarily mean that we need to go ahead and revascularize.    I think it is time to start over and see how she does.  She will need slow increase in her blood pressure medications.  If she becomes more azotemic again I do think we should consider renal artery revascularization, but her lesion really does not seem that tight.  In any event, in the setting of recent TIMMY I would not send her for a angiogram until her renal function stabilizes.  There is almost no indication for urgent renal artery intervention    Importantly, I believe this is a patient that needs to be seen every 3 weeks (virtual okay) with frequent checks of her electrolyte and renal function.  I do not think that 3-month follow-up is appropriate.  Can you get her in to see someone sooner?    Thanks      ----- Message -----  From: Silvino Saldaña M.D.  Sent: 11/24/2020  10:54 AM PST  To: Michael J Bloch, M.D.  Subject: case review                                      This patient had \"accelerated\" severe HTN and with BP med intensification progressively, then had TIMMY with hypotension and had to be hospitalized for 4 days to recover renal and BP stability.  All BP meds stopped except amlodipine and hydralazine by bj SOLO.   Has background of mild L renal atrophy and moderate L renal art stenosis per CTA.      I had prior read about \"pressor kidney\" and have reviewed some case studies including this one: "   [email or web address]://[email or web address].asnjournals.org/content/9/[email or web address]/[email or web address]    I'm thinking she may have this scenario.  Would you review the case and hospital docs (sorry about the time commitment) and let me know if we should continue to just use judicious BP med mgmt and titration or should Angel/Jennifer do PTRA and consider stenting?      It's a good and complicated case and one that got brought to my attention by Abdirashid Salas at  as he saw her after about 1 week post intensification of her BP regimen under my care and he sent her to ED and subsequent admission.      I'd like to learn what I can from this one from you, my master-mentor (yaneth German)!!!    Thanks!

## 2021-05-10 NOTE — ASSESSMENT & PLAN NOTE
New to examiner and patient.  Patient states that vaginal itching started around the same time as her dysuria.  Reports that the itching is worse at night, denies vaginal discharge or odor.

## 2021-05-10 NOTE — PROGRESS NOTES
CC:   Chief Complaint   Patient presents with   • UTI     x 10 days     HISTORY OF THE PRESENT ILLNESS: Patient is a 65 y.o. female. This pleasant patient is here today to discuss possible UTI symptoms present for 10 days.    Health Maintenance: Reviewed    Annual Health Assessment Questions:    1.  Are you currently engaging in any exercise or physical activity? No    2.  How would you describe your mood or emotional well-being today? good    3.  Have you had any falls in the last year? No    4.  Have you noticed any problems with your balance or had difficulty walking? No    5.  In the last six months have you experienced any leakage of urine? Yes    6. DPA/Advanced Directive: Patient does not have an Advanced Directive.  A packet and workshop information was given on Advanced Directives.    Dysuria  New to examiner.  Current episode started about 10 days ago.  Patient states that symptoms began with severe pain when voiding, feeling the need to void every 5 minutes with no urine.  Reports that there may have been blood in her urine as it looked dark, she was also experiencing some pelvic pressure.  Denies fevers, chills, flank pain.  She has not tried any over-the-counter remedies.    Vaginal itching  New to examiner and patient.  Patient states that vaginal itching started around the same time as her dysuria.  Reports that the itching is worse at night, denies vaginal discharge or odor.    Type 2 diabetes mellitus with stage 3b chronic kidney disease, without long-term current use of insulin (HCC)  Chronic, ongoing.  Continues Metformin 1000 mg twice daily, Jardiance 10 mg daily, and Lantus 7 units every evening.  Reports that she does not check her morning fasting blood sugars daily, last check was Friday morning, nonfasting 127.  Denies any episodes of hypoglycemia.  Due for updated labs.    Superior mesenteric artery stenosis (HCC)  Continues to follow with vascular medicine, overdue for follow-up  appointment.  Superior mesenteric artery stenosis resolved per CTA.    Long term (current) use of insulin (HCC)  New to examiner.  Chronic problem for the patient.  Continues Lantus 7 units nightly.  Due for updated labs.    Allergies: Codeine; Ace inhibitors; and Flagyl [metronidazole]  Current Outpatient Medications Ordered in Epic   Medication Sig Dispense Refill   • losartan (COZAAR) 100 MG Tab      • Zoster Vac Recomb Adjuvanted (SHINGRIX) 50 MCG/0.5ML Recon Susp Inject 0.5 mL into the shoulder, thigh, or buttocks one time for 1 dose. 0.5 mL 0   • nitrofurantoin (MACROBID) 100 MG Cap Take 1 capsule by mouth 2 times a day for 5 days. 10 capsule 0   • phenazopyridine (PYRIDIUM) 200 MG Tab Take 1 tablet by mouth 3 times a day as needed. 6 tablet 0   • fluconazole (DIFLUCAN) 150 MG tablet Take 1 tablet by mouth every day for 1 day. Take once antibiotics completed 1 tablet 0   • atorvastatin (LIPITOR) 40 MG Tab Take 1 Tab by mouth every bedtime. 100 Tab 2   • amLODIPine (NORVASC) 5 MG Tab Take 1 Tab by mouth 2 Times a Day. 60 Tab 5   • Empagliflozin (JARDIANCE) 10 MG Tab Take 1 tablet by mouth every day for 360 days. 30 Tab 5   • CONTOUR NEXT TEST strip      • Blood Glucose Test Strips Use one Claudette Contour Next strip to test blood sugar once daily . 100 Each 3   • insulin glargine (LANTUS SOLOSTAR) 100 UNIT/ML Solution Pen-injector injection Inject 7 Units as instructed every evening. 6 mL 0   • aspirin 81 MG EC tablet Take 1 Tab by mouth every day. 30 Tab 0   • metformin (GLUCOPHAGE) 1000 MG tablet Take 1 Tab by mouth 2 times a day, with meals. 200 Tab 3   • Lancets Use one Claudette Contour Next lancet to test blood sugar once daily . 100 Each 0   • Insulin Pen Needle 32 G x 4 mm Use one pen needle in pen device to inject insulin once daily. 100 Each 0     No current [facility]-ordered facility-administered medications on file.     Past Medical History:   Diagnosis Date   • Acute left-sided low back pain without sciatica  2/24/2020   • Anesthesia     hard time waking up   • Chronic low back pain    • Dental disorder    • DM (diabetes mellitus) (HCC)     oral medication   • Hospital discharge follow-up 9/23/2020   • Hypercholesteremia    • Hypertension    • Mass of cecum 9/15    tubulovillous adenoma polyp   • Obesity (BMI 30.0-34.9)    • Other specified disorder of intestines    • Superior mesenteric artery stenosis (HCC) 9/21/2020     Past Surgical History:   Procedure Laterality Date   • CHOLECYSTECTOMY  8/21/2015    Procedure: LAPAROSCOPIC CHOLECYSTECTOMY;  Surgeon: Kris Garrison M.D.;  Location: SURGERY [facility];  Service:    • BOWEL RESECTION LAPAROSCOPIC  8/21/2015    Procedure: LAPAROSCOPIC ILEOCOLIC RESECTION;  Surgeon: Kris Garrison M.D.;  Location: SURGERY [facility];  Service:    • COLONOSCOPY WITH POLYP  8/13/15    multiple polyps, tumor cecum, diverticulosis, int hemorrhoids   • APPENDECTOMY  2015   • ARTHROSCOPY, KNEE  3/14    torn meniscus   • PELVISCOPY  4/6/2012    Performed by HILDA MACE at SURGERY SAME DAY [facility] ORS   • LYSIS ADHESIONS GYN  4/6/2012    Performed by HILDA MACE at SURGERY SAME DAY [facility] ORS   • COLONOSCOPY WITH POLYP  2012    3 polyps - hyperplastic, benign serrated, tubulovillous adenoma , scattered diverticula sigmoid colon - [facility]   • OTHER      TONSILLECTOMY   • OTHER      REPAIR LT URETER AGE 19     Social History     Tobacco Use   • Smoking status: Never Smoker   • Smokeless tobacco: Never Used   Substance Use Topics   • Alcohol use: No     Alcohol/week: 0.0 oz   • Drug use: No     Social History     Social History Narrative   • Not on file     Family History   Problem Relation Age of Onset   • Cancer Mother         breast cancer   • Lung Disease Mother         emphysema   • Hypertension Father    • Alzheimer's Disease Sister    • Dementia Sister    • Hypertension Brother    • No Known Problems Sister    • No Known Problems Sister    • Hypertension Brother   "    ROS:   Constitutional: No fevers, chills, malaise/fatigue.  Eyes: No eye pain.  ENT: No sore throat, congestion.   Resp: No cough, shortness of breath.  CV: No chest pain, leg swelling, palpitations.  GI: No nausea/vomiting, abdominal pain, constipation, diarrhea.  : + dysuria, frequency, hematuria.  MSK: No weakness.  Skin: No rashes.  Neuro: No dizziness, weakness, headaches.  Psych: No suicidal ideations.    All remaining systems reviewed and found to be negative, except as stated above.        Exam: /82 (BP Location: Left arm, Patient Position: Sitting, BP Cuff Size: Adult)   Pulse 67   Temp 36.8 °C (98.2 °F) (Temporal)   Ht 1.702 m (5' 7\")   Wt 89.8 kg (198 lb)   SpO2 94%  Body mass index is 31.01 kg/m².    General: Well nourished, well developed female in NAD, awake and conversant.  Eyes: Normal conjunctiva, anicteric.  Round symmetrical pupils.  ENT: Hearing grossly intact.  No nasal discharge.  Neck: Neck is supple.  No masses or thyromegaly.  CV: No lower extremity edema.  Respiratory: Respirations are nonlabored.  No wheezing.  Abdomen: Non-Distended.  Skin: Warm.  No rashes or ulcers.  MSK: Normal ambulation.  No clubbing or cyanosis.  Neuro: Sensation and CN II-XII grossly normal.  Psych: Alert and oriented.  Cooperative, appropriate mood and affect, normal judgment.     Assessment/Plan:  1. Acute cystitis with hematuria  2. Vaginal itching  New problem, abnormal urine dipstick in office. Urine sent for culture. Start Macrobid 100 mg twice daily for 5 days, Pyridium 200 mg 3 times daily as needed for dysuria, and Diflucan 150 mg for 1 dose once completed with antibiotics..  Provided education to drink plenty of fluids, wipe front to back every void and bowel movement.   Return to clinic if symptoms not improving within 3-4 days or in case of vomiting, fever, increasing pain.   - POCT Urinalysis  - URINE CULTURE(NEW); Future  - nitrofurantoin (MACROBID) 100 MG Cap; Take 1 capsule by mouth " 2 times a day for 5 days.  Dispense: 10 capsule; Refill: 0  - phenazopyridine (PYRIDIUM) 200 MG Tab; Take 1 tablet by mouth 3 times a day as needed.  Dispense: 6 tablet; Refill: 0  - fluconazole (DIFLUCAN) 150 MG tablet; Take 1 tablet by mouth every day for 1 day. Take once antibiotics completed  Dispense: 1 tablet; Refill: 0    3. Type 2 diabetes mellitus with stage 3b chronic kidney disease, without long-term current use of insulin (HCC)  4. Long term (current) use of insulin (HCC)  5. Stage 3b chronic kidney disease  Chronic, ongoing.  Continue Metformin 1000 mg twice daily, Jardiance 10 mg daily, Lantus 7 units every evening.  Encourage patient to schedule follow-up with vascular medicine.  Due for updated labs.  - HEMOGLOBIN A1C; Future  - Comp Metabolic Panel; Future  - Lipid Profile; Future  - MICROALBUMIN CREAT RATIO URINE; Future  - CBC WITH DIFFERENTIAL; Future    6. Essential hypertension  Chronic, ongoing.  Continues to follow with vascular medicine, overdue for follow-up.  Continue amlodipine 5 mg twice daily, losartan 100 mg daily.  Due for updated annual labs.  - CBC WITH DIFFERENTIAL; Future  - Comp Metabolic Panel; Future  - MICROALBUMIN CREAT RATIO URINE; Future  - TSH WITH REFLEX TO FT4; Future    7. Dyslipidemia  Chronic, ongoing.  Continue to follow with vascular medicine, overdue for follow-up.  Continue atorvastatin 40 mg every bedtime, does not need refill at this time.  Due for updated annual labs.  - Comp Metabolic Panel; Future  - Lipid Profile; Future  - TSH WITH REFLEX TO FT4; Future    8. Class 1 obesity with serious comorbidity in adult, unspecified BMI, unspecified obesity type  Chronic, ongoing.  Encouraged healthy diet, exercise, weight loss.  Due for updated annual labs.  - HEMOGLOBIN A1C; Future  - CBC WITH DIFFERENTIAL; Future  - Comp Metabolic Panel; Future  - Lipid Profile; Future  - TSH WITH REFLEX TO FT4; Future    9. Vitamin D insufficiency  New to examiner.  Recommend  over-the-counter vitamin D supplement daily.  Due for updated annual labs.  - VITAMIN D,25 HYDROXY; Future    10. Superior mesenteric artery stenosis (HCC)  Resolved per CTA.  Continue to follow with vascular medicine.    11. Need for vaccination  Patient provided with paper prescription for Shingrix vaccine.  Advised patient to take prescription to pharmacy to check for availability and/or to be put on a waiting list.  Advised the patient that Shingrix is a series of two consecutive vaccines.   - Zoster Vac Recomb Adjuvanted (SHINGRIX) 50 MCG/0.5ML Recon Susp; Inject 0.5 mL into the shoulder, thigh, or buttocks one time for 1 dose.  Dispense: 0.5 mL; Refill: 0     Educated in proper administration of medication(s) ordered today including safety, possible SE, risks, benefits, rationale and alternatives to therapy.   Supportive care, differential diagnoses, and indications for immediate follow-up discussed with patient.    Pathogenesis of diagnosis discussed including typical length and natural progression.    Instructed to return to clinic or nearest emergency department for any change in condition, further concerns, or worsening of symptoms.  Patient states understanding of the plan of care and discharge instructions.    Return in about 8 weeks (around 7/5/2021) for HC/PCP Annual Wellness Visit- Medicare, Follow up Labs.    Please note that this dictation was created using voice recognition software. I have made every reasonable attempt to correct obvious errors, but I expect that there are errors of grammar and possibly content that I did not discover before finalizing the note.

## 2021-05-10 NOTE — ASSESSMENT & PLAN NOTE
Chronic, ongoing.  Continues Metformin 1000 mg twice daily, Jardiance 10 mg daily, and Lantus 7 units every evening.  Reports that she does not check her morning fasting blood sugars daily, last check was Friday morning, nonfasting 127.  Denies any episodes of hypoglycemia.  Due for updated labs.

## 2021-05-10 NOTE — ASSESSMENT & PLAN NOTE
New to examiner.  Current episode started about 10 days ago.  Patient states that symptoms began with severe pain when voiding, feeling the need to void every 5 minutes with no urine.  Reports that there may have been blood in her urine as it looked dark, she was also experiencing some pelvic pressure.  Denies fevers, chills, flank pain.  She has not tried any over-the-counter remedies.

## 2021-05-10 NOTE — ASSESSMENT & PLAN NOTE
Continues to follow with vascular medicine, overdue for follow-up appointment.  Superior mesenteric artery stenosis resolved per CTA.

## 2021-05-10 NOTE — ASSESSMENT & PLAN NOTE
New to examiner.  Chronic problem for the patient.  Continues Lantus 7 units nightly.  Due for updated labs.

## 2021-05-11 NOTE — TELEPHONE ENCOUNTER
Patient overdue for follow-up appt with vascular care.  Medical assistant has been unable to reach and reschedule  Multiple messages have been left  Await further patient contact.  Pending further contact, will defer all vascular care, including management of cardiac vascular risk factors, to PCP    Michael J. Bloch, MD  Vascular Care    Cc:  BRANDIN Roach

## 2021-05-18 ENCOUNTER — PATIENT MESSAGE (OUTPATIENT)
Dept: HEALTH INFORMATION MANAGEMENT | Facility: OTHER | Age: 66
End: 2021-05-18

## 2021-06-04 RX ORDER — LOSARTAN POTASSIUM 100 MG/1
TABLET ORAL
Qty: 100 TABLET | Refills: 0 | Status: SHIPPED | OUTPATIENT
Start: 2021-06-04

## 2021-06-04 NOTE — TELEPHONE ENCOUNTER
Requested Prescriptions     Pending Prescriptions Disp Refills   • losartan (COZAAR) 100 MG Tab [Pharmacy Med Name: LOSARTAN POTASSIUM 100 MG TAB] 100 tablet 0     Sig: TAKE ONE TABLET BY MOUTH DAILY       JEANNE Kaminski.

## 2021-06-08 ENCOUNTER — HOSPITAL ENCOUNTER (OUTPATIENT)
Facility: MEDICAL CENTER | Age: 66
End: 2021-06-08
Attending: NURSE PRACTITIONER
Payer: MEDICARE

## 2021-06-08 ENCOUNTER — OFFICE VISIT (OUTPATIENT)
Dept: MEDICAL GROUP | Facility: PHYSICIAN GROUP | Age: 66
End: 2021-06-08
Payer: MEDICARE

## 2021-06-08 VITALS
HEIGHT: 67 IN | TEMPERATURE: 96.9 F | DIASTOLIC BLOOD PRESSURE: 70 MMHG | SYSTOLIC BLOOD PRESSURE: 138 MMHG | OXYGEN SATURATION: 95 % | WEIGHT: 198 LBS | HEART RATE: 79 BPM | BODY MASS INDEX: 31.08 KG/M2

## 2021-06-08 DIAGNOSIS — N18.32 TYPE 2 DIABETES MELLITUS WITH STAGE 3B CHRONIC KIDNEY DISEASE, WITH LONG-TERM CURRENT USE OF INSULIN (HCC): ICD-10-CM

## 2021-06-08 DIAGNOSIS — E11.22 TYPE 2 DIABETES MELLITUS WITH STAGE 3B CHRONIC KIDNEY DISEASE, WITHOUT LONG-TERM CURRENT USE OF INSULIN (HCC): ICD-10-CM

## 2021-06-08 DIAGNOSIS — Z12.31 ENCOUNTER FOR SCREENING MAMMOGRAM FOR BREAST CANCER: ICD-10-CM

## 2021-06-08 DIAGNOSIS — Z23 NEED FOR VACCINATION: ICD-10-CM

## 2021-06-08 DIAGNOSIS — N18.32 TYPE 2 DIABETES MELLITUS WITH STAGE 3B CHRONIC KIDNEY DISEASE, WITHOUT LONG-TERM CURRENT USE OF INSULIN (HCC): ICD-10-CM

## 2021-06-08 DIAGNOSIS — Z79.4 TYPE 2 DIABETES MELLITUS WITH STAGE 3B CHRONIC KIDNEY DISEASE, WITH LONG-TERM CURRENT USE OF INSULIN (HCC): ICD-10-CM

## 2021-06-08 DIAGNOSIS — Z78.0 POSTMENOPAUSAL STATUS (AGE-RELATED) (NATURAL): ICD-10-CM

## 2021-06-08 DIAGNOSIS — R10.9 LEFT FLANK PAIN: ICD-10-CM

## 2021-06-08 DIAGNOSIS — N18.32 STAGE 3B CHRONIC KIDNEY DISEASE: ICD-10-CM

## 2021-06-08 DIAGNOSIS — N95.1 MENOPAUSAL STATE: ICD-10-CM

## 2021-06-08 DIAGNOSIS — E11.22 TYPE 2 DIABETES MELLITUS WITH STAGE 3B CHRONIC KIDNEY DISEASE, WITH LONG-TERM CURRENT USE OF INSULIN (HCC): ICD-10-CM

## 2021-06-08 DIAGNOSIS — N30.01 ACUTE CYSTITIS WITH HEMATURIA: ICD-10-CM

## 2021-06-08 DIAGNOSIS — N39.0 RECURRENT UTI: ICD-10-CM

## 2021-06-08 LAB
APPEARANCE UR: NORMAL
BILIRUB UR STRIP-MCNC: NEGATIVE MG/DL
COLOR UR AUTO: NORMAL
GLUCOSE UR STRIP.AUTO-MCNC: 500 MG/DL
HBA1C MFR BLD: 9.1 % (ref 0–5.6)
INT CON NEG: ABNORMAL
INT CON POS: ABNORMAL
KETONES UR STRIP.AUTO-MCNC: 15 MG/DL
LEUKOCYTE ESTERASE UR QL STRIP.AUTO: NORMAL
NITRITE UR QL STRIP.AUTO: NEGATIVE
PH UR STRIP.AUTO: 5 [PH] (ref 5–8)
PROT UR QL STRIP: 100 MG/DL
RBC UR QL AUTO: NORMAL
SP GR UR STRIP.AUTO: 1.02
UROBILINOGEN UR STRIP-MCNC: 0.2 MG/DL

## 2021-06-08 PROCEDURE — 87086 URINE CULTURE/COLONY COUNT: CPT

## 2021-06-08 PROCEDURE — 81002 URINALYSIS NONAUTO W/O SCOPE: CPT | Performed by: NURSE PRACTITIONER

## 2021-06-08 PROCEDURE — 87077 CULTURE AEROBIC IDENTIFY: CPT

## 2021-06-08 PROCEDURE — 87186 SC STD MICRODIL/AGAR DIL: CPT

## 2021-06-08 PROCEDURE — 99214 OFFICE O/P EST MOD 30 MIN: CPT | Performed by: NURSE PRACTITIONER

## 2021-06-08 PROCEDURE — 83036 HEMOGLOBIN GLYCOSYLATED A1C: CPT | Performed by: NURSE PRACTITIONER

## 2021-06-08 RX ORDER — NITROFURANTOIN 25; 75 MG/1; MG/1
100 CAPSULE ORAL 2 TIMES DAILY
Qty: 10 CAPSULE | Refills: 0 | Status: SHIPPED | OUTPATIENT
Start: 2021-06-08 | End: 2021-06-13

## 2021-06-08 RX ORDER — CIPROFLOXACIN 500 MG/1
500 TABLET, FILM COATED ORAL 2 TIMES DAILY
Qty: 14 TABLET | Refills: 0 | Status: CANCELLED | OUTPATIENT
Start: 2021-06-08 | End: 2021-06-15

## 2021-06-08 RX ORDER — HYDROCODONE BITARTRATE AND ACETAMINOPHEN 5; 325 MG/1; MG/1
1 TABLET ORAL EVERY 4 HOURS PRN
Qty: 20 TABLET | Refills: 0 | Status: SHIPPED | OUTPATIENT
Start: 2021-06-08 | End: 2021-06-13

## 2021-06-08 RX ORDER — PHENAZOPYRIDINE HYDROCHLORIDE 200 MG/1
200 TABLET, FILM COATED ORAL 3 TIMES DAILY PRN
Qty: 6 TABLET | Refills: 0 | Status: SHIPPED | OUTPATIENT
Start: 2021-06-08

## 2021-06-08 ASSESSMENT — FIBROSIS 4 INDEX: FIB4 SCORE: 0.65

## 2021-06-08 NOTE — PROGRESS NOTES
CC:   Chief Complaint   Patient presents with   • UTI     x 1 week     HISTORY OF THE PRESENT ILLNESS: Patient is a 66 y.o. female. This pleasant patient is here today to discuss possible UTI symptoms present for 14 days, last UTI 5/10/2021.    Health Maintenance: Reviewed    Annual Health Assessment Questions:    1.  Are you currently engaging in any exercise or physical activity? No    2.  How would you describe your mood or emotional well-being today? good    3.  Have you had any falls in the last year? Yes    4.  Have you noticed any problems with your balance or had difficulty walking? No    5.  In the last six months have you experienced any leakage of urine? Yes    6. DPA/Advanced Directive: Patient does not have an Advanced Directive.  A packet and workshop information was given on Advanced Directives.    Dysuria  Chronic, ongoing.  Patient was seen on 5/10/2021 with symptoms of UTI for 10 days, she was treated with Macrobid with a positive urine culture for E. coli.  Patient states that symptoms seem to return after completing Macrobid, but have been bothersome for at least 2 weeks.  States that she is having pain with voiding, urinary frequency with small amount of urine, left-sided flank pain and left-sided groin pain.    Type 2 diabetes mellitus with stage 3b chronic kidney disease, with long-term current use of insulin ([facility])  Chronic, ongoing.  Continues Metformin 1000 mg twice daily, Jardiance 10 mg daily, and Lantus 7 units every evening.  The patient does check her morning fasting blood sugars and reports that they range 120-130 and at bedtime they range 150-170.  She has been working on decreasing sugar in her diet.  Recently had eye exam and got new glasses.  Is experiencing recurrent UTIs.    Stage 3b chronic kidney disease (HCC)  Chronic, ongoing. Labs last completed in November 2020 with a GFR 34, BUN 43, creatinine 1.54.  Due for updated labs.    Allergies: Codeine; Ace inhibitors; and Flagyl  [metronidazole]  Current Outpatient Medications Ordered in Epic   Medication Sig Dispense Refill   • nitrofurantoin (MACROBID) 100 MG Cap Take 1 capsule by mouth 2 times a day for 5 days. 10 capsule 0   • HYDROcodone-acetaminophen (NORCO) 5-325 MG Tab per tablet Take 1 tablet by mouth every four hours as needed (flank pain) for up to 5 days. 20 tablet 0   • phenazopyridine (PYRIDIUM) 200 MG Tab Take 1 tablet by mouth 3 times a day as needed for Moderate Pain. 6 tablet 0   • losartan (COZAAR) 100 MG Tab TAKE ONE TABLET BY MOUTH DAILY 100 tablet 0   • atorvastatin (LIPITOR) 40 MG Tab Take 1 Tab by mouth every bedtime. 100 Tab 2   • amLODIPine (NORVASC) 5 MG Tab Take 1 Tab by mouth 2 Times a Day. 60 Tab 5   • Empagliflozin (JARDIANCE) 10 MG Tab Take 1 tablet by mouth every day for 360 days. 30 Tab 5   • CONTOUR NEXT TEST strip      • Blood Glucose Test Strips Use one Claudette Contour Next strip to test blood sugar once daily . 100 Each 3   • insulin glargine (LANTUS SOLOSTAR) 100 UNIT/ML Solution Pen-injector injection Inject 7 Units as instructed every evening. 6 mL 0   • Lancets Use one Claudette Contour Next lancet to test blood sugar once daily . 100 Each 0   • Insulin Pen Needle 32 G x 4 mm Use one pen needle in pen device to inject insulin once daily. 100 Each 0   • aspirin 81 MG EC tablet Take 1 Tab by mouth every day. 30 Tab 0   • metformin (GLUCOPHAGE) 1000 MG tablet Take 1 Tab by mouth 2 times a day, with meals. 200 Tab 3     No current Epic-ordered facility-administered medications on file.     Past Medical History:   Diagnosis Date   • Acute left-sided low back pain without sciatica 2/24/2020   • Anesthesia     hard time waking up   • Chronic low back pain    • Dental disorder    • DM (diabetes mellitus) (HCC)     oral medication   • Hospital discharge follow-up 9/23/2020   • Hypercholesteremia    • Hypertension    • Mass of cecum 9/15    tubulovillous adenoma polyp   • Obesity (BMI 30.0-34.9)    • Other specified  disorder of intestines    • Superior mesenteric artery stenosis (HCC) 9/21/2020     Past Surgical History:   Procedure Laterality Date   • CHOLECYSTECTOMY  8/21/2015    Procedure: LAPAROSCOPIC CHOLECYSTECTOMY;  Surgeon: Kris Garrison M.D.;  Location: SURGERY [facility];  Service:    • BOWEL RESECTION LAPAROSCOPIC  8/21/2015    Procedure: LAPAROSCOPIC ILEOCOLIC RESECTION;  Surgeon: Kris Garrison M.D.;  Location: SURGERY [facility];  Service:    • COLONOSCOPY WITH POLYP  8/13/15    multiple polyps, tumor cecum, diverticulosis, int hemorrhoids   • APPENDECTOMY  2015   • ARTHROSCOPY, KNEE  3/14    torn meniscus   • PELVISCOPY  4/6/2012    Performed by HILDA MACE at SURGERY SAME DAY [facility] ORS   • LYSIS ADHESIONS GYN  4/6/2012    Performed by HILDA MACE at SURGERY SAME DAY [facility] ORS   • COLONOSCOPY WITH POLYP  2012    3 polyps - hyperplastic, benign serrated, tubulovillous adenoma , scattered diverticula sigmoid colon - [facility]   • OTHER      TONSILLECTOMY   • OTHER      REPAIR LT URETER AGE 19     Social History     Tobacco Use   • Smoking status: Never Smoker   • Smokeless tobacco: Never Used   Vaping Use   • Vaping Use: Never used   Substance Use Topics   • Alcohol use: No     Alcohol/week: 0.0 oz   • Drug use: No     Social History     Social History Narrative   • Not on file     Family History   Problem Relation Age of Onset   • Cancer Mother         breast cancer   • Lung Disease Mother         emphysema   • Hypertension Father    • Alzheimer's Disease Sister    • Dementia Sister    • Hypertension Brother    • No Known Problems Sister    • No Known Problems Sister    • Hypertension Brother      ROS:   Constitutional: No fevers, chills, malaise/fatigue.  Eyes: No eye pain.  ENT: No sore throat, congestion.   Resp: No cough, shortness of breath.  CV: No chest pain, leg swelling, palpitations.  GI: No nausea/vomiting, abdominal pain, constipation, diarrhea.  : + dysuria, frequency, left  "flank and left groin pain.  MSK: No weakness.  Skin: No rashes.  Neuro: No dizziness, weakness, headaches.  Psych: No suicidal ideations.    All remaining systems reviewed and found to be negative, except as stated above.        Exam: /70 (BP Location: Left arm, Patient Position: Sitting, BP Cuff Size: Adult)   Pulse 79   Temp 36.1 °C (96.9 °F) (Temporal)   Ht 1.702 m (5' 7\")   Wt 89.8 kg (198 lb)   SpO2 95%  Body mass index is 31.01 kg/m².    General: Well nourished, well developed female in NAD, awake and conversant.  Eyes: Normal conjunctiva, anicteric.  Round symmetrical pupils.  ENT: Hearing grossly intact.  No nasal discharge.  Neck: Neck is supple.  No masses or thyromegaly.  CV: No lower extremity edema.  Respiratory: Respirations are nonlabored.  No wheezing.  Abdomen: Positive left CVA tenderness. Non-Distended.  Skin: Warm.  No rashes or ulcers.  MSK: Normal ambulation.  No clubbing or cyanosis.  Neuro: Sensation and CN II-XII grossly normal.  Psych: Alert and oriented.  Cooperative, appropriate mood and affect, normal judgment.     Assessment/Plan:  1. Acute cystitis with hematuria  2. Left flank pain  3. Recurrent UTI  New problem to examiner, symptoms present for approximately 2 weeks.  Patient's last UTI 5/10/2021.  Urine sent for culture.  Prescription for Macrobid sent to pharmacy in addition to Pyridium as needed for dysuria.  Considering patient's history, frequent UTI, significant left flank and groin pain with UTI symptoms, and concern for pyelonephritis, it is strongly recommended for the patient to be evaluated in the emergency room.  Patient understands and states that she will go to the emergency room today.  Due to patient's chronic stage IIIb kidney disease, NSAIDs are not indicated.  Patient has tolerated Vicodin in the past, hydrocodone sent to patient's pharmacy for short-term pain management.  Consent for opiate prescription signed and scanned into chart.    Referral to " urology for recurrent UTIs.  CT renal colic ordered.  - POCT Urinalysis  - URINE CULTURE(NEW); Future  - Basic Metabolic Panel; Future  - CT-RENAL COLIC EVALUATION(A/P W/O); Future  - REFERRAL TO UROLOGY  - nitrofurantoin (MACROBID) 100 MG Cap; Take 1 capsule by mouth 2 times a day for 5 days.  Dispense: 10 capsule; Refill: 0  - HYDROcodone-acetaminophen (NORCO) 5-325 MG Tab per tablet; Take 1 tablet by mouth every four hours as needed (flank pain) for up to 5 days.  Dispense: 20 tablet; Refill: 0  - Consent for Opiate Prescription  - phenazopyridine (PYRIDIUM) 200 MG Tab; Take 1 tablet by mouth 3 times a day as needed for Moderate Pain.  Dispense: 6 tablet; Refill: 0    4. Type 2 diabetes mellitus with stage 3b chronic kidney disease, with long-term current use of insulin (HCC)  5. Stage 3b chronic kidney disease (HCC)  Chronic, uncontrolled.  A1c in November 2020 8.7%, in clinic today 9.1%.  Continue Metformin 1000 mg twice daily, Jardiance 10 mg daily, Lantus 7 units nightly.  Referral to pharmacotherapy services for uncontrolled diabetes.  Up-to-date on retinal screen per patient.  - POCT  A1C  - Basic Metabolic Panel; Future  - REFERRAL TO PHARMACOTHERAPY SERVICE    6. Postmenopausal status (age-related) (natural)  7. Menopausal state  Due for screening.  - DS-BONE DENSITY STUDY (DEXA)    8. Encounter for screening mammogram for breast cancer  Due for screening.  - MA-SCREENING MAMMO BILAT W/CAD; Future    9. Need for vaccination  Patient provided with paper prescription for Shingrix vaccine.  Advised patient to take prescription to pharmacy to check for availability and/or to be put on a waiting list.  Advised the patient that Shingrix is a series of two consecutive vaccines.   - Zoster Vac Recomb Adjuvanted (SHINGRIX) 50 MCG/0.5ML Recon Susp; Inject 0.5 mL into the shoulder, thigh, or buttocks one time for 1 dose.  Dispense: 0.5 mL; Refill: 0     Educated in proper administration of medication(s) ordered  today including safety, possible SE, risks, benefits, rationale and alternatives to therapy.   Supportive care, differential diagnoses, and indications for immediate follow-up discussed with patient.    Pathogenesis of diagnosis discussed including typical length and natural progression.    Instructed to return to clinic or nearest emergency department for any change in condition, further concerns, or worsening of symptoms.  Patient states understanding of the plan of care and discharge instructions.    Return if symptoms worsen or fail to improve.    I have placed the below orders and discussed them with an approved delegating provider.  The MA is performing the below orders under the direction of Dr. Reyez.     Please note that this dictation was created using voice recognition software. I have made every reasonable attempt to correct obvious errors, but I expect that there are errors of grammar and possibly content that I did not discover before finalizing the note.

## 2021-06-09 DIAGNOSIS — N30.01 ACUTE CYSTITIS WITH HEMATURIA: ICD-10-CM

## 2021-06-09 NOTE — ASSESSMENT & PLAN NOTE
Chronic, ongoing.  Continues Metformin 1000 mg twice daily, Jardiance 10 mg daily, and Lantus 7 units every evening.  The patient does check her morning fasting blood sugars and reports that they range 120-130 and at bedtime they range 150-170.  She has been working on decreasing sugar in her diet.  Recently had eye exam and got new glasses.  Is experiencing recurrent UTIs.

## 2021-06-09 NOTE — ASSESSMENT & PLAN NOTE
Chronic, ongoing.  Patient was seen on 5/10/2021 with symptoms of UTI for 10 days, she was treated with Macrobid with a positive urine culture for E. coli.  Patient states that symptoms seem to return after completing Macrobid, but have been bothersome for at least 2 weeks.  States that she is having pain with voiding, urinary frequency with small amount of urine, left-sided flank pain and left-sided groin pain.

## 2021-06-09 NOTE — ASSESSMENT & PLAN NOTE
Chronic, ongoing. Labs last completed in November 2020 with a GFR 34, BUN 43, creatinine 1.54.  Due for updated labs.

## 2021-06-11 ENCOUNTER — TELEPHONE (OUTPATIENT)
Dept: VASCULAR LAB | Facility: MEDICAL CENTER | Age: 66
End: 2021-06-11

## 2021-06-11 NOTE — TELEPHONE ENCOUNTER
Left voicemail message for patient to return call to [facility] to establish care      RE Mireles, Clinical Pharmacist, CDE, CACP

## 2021-06-14 ENCOUNTER — PATIENT OUTREACH (OUTPATIENT)
Dept: HEALTH INFORMATION MANAGEMENT | Facility: OTHER | Age: 66
End: 2021-06-14

## 2021-06-14 NOTE — PROGRESS NOTES
Outcome: Left Message     Please transfer to Patient Outreach Team at [phone] when patient returns call.    HealthConnect Verified: yes     Attempt # 2

## 2021-06-16 ENCOUNTER — DOCUMENTATION (OUTPATIENT)
Dept: VASCULAR LAB | Facility: MEDICAL CENTER | Age: 66
End: 2021-06-16

## 2021-06-16 NOTE — PROGRESS NOTES
[facility] Heart and Vascular Health and Pharmacotherapy Programs    Received pharmacotherapy referral due to hx of DMII from Genoveva Roach on 6-8-21.    LM for patient to call back to establish care.    Insurance: [facility]  PCP: [facility]  Locations to be seen: Any    [facility]tico[facility]Pharmacotherapy [facility] at [phone], fax [phone]    José Manuel Gonzalez, PharmD, BCACP

## 2021-06-22 NOTE — PROGRESS NOTES
Outcome: Left Message    Received member's voicemail regarding a medication appointment. Called back and left message to call [phone] per encounter from PharmaD department. Also advised it's time to schedule OWEN.      Please transfer to Patient Outreach Team at [phone] when patient returns call.          Attempt # 3

## 2021-06-23 ENCOUNTER — HOSPITAL ENCOUNTER (OUTPATIENT)
Dept: RADIOLOGY | Facility: MEDICAL CENTER | Age: 66
End: 2021-06-23
Attending: NURSE PRACTITIONER
Payer: MEDICARE

## 2021-06-23 ENCOUNTER — DOCUMENTATION (OUTPATIENT)
Dept: VASCULAR LAB | Facility: MEDICAL CENTER | Age: 66
End: 2021-06-23

## 2021-06-23 PROCEDURE — 77080 DXA BONE DENSITY AXIAL: CPT

## 2021-06-23 NOTE — PROGRESS NOTES
[facility] Heart and Vascular Health and Pharmacotherapy Programs    Received pharmacotherapy referral due to hx of DMII from Genoveva Roach on 6-8-21.    LM for patient to call back to establish care.    Insurance: [facility]  PCP: [facility]  Locations to be seen: Any    [facility]ticoagulation/Pharmacotherapy [facility] at [phone], fax [phone]    José Manuel Gonzalez, PharmD, BCACP

## 2021-06-30 ENCOUNTER — DOCUMENTATION (OUTPATIENT)
Dept: VASCULAR LAB | Facility: MEDICAL CENTER | Age: 66
End: 2021-06-30

## 2021-06-30 ENCOUNTER — TELEPHONE (OUTPATIENT)
Dept: MEDICAL GROUP | Facility: PHYSICIAN GROUP | Age: 66
End: 2021-06-30

## 2021-06-30 NOTE — PROGRESS NOTES
[facility] Heart and Vascular Health and Pharmacotherapy Programs    Received pharmacotherapy referral for DMII  from Genoveva OWRRELL on 6-8-21.    LM for patient to call back to establish care.    Insurance: [facility]  PCP: [facility]  Locations to be seen: Any    [facility] Anticoagulation/Pharmacotherapy Clinic at [phone], fax [phone]    José Manuel Gonzalez, MichaelD, BCACP

## 2021-06-30 NOTE — TELEPHONE ENCOUNTER
Future Appointments       Provider Department Center    7/6/2021 3:00 PM FILI Kaminski [facility]        ANNUAL WELLNESS VISIT PRE-VISIT PLANNING    1.  Reviewed notes from the last office visit: Yes,  LOV 06/08/2021    2.  If any orders were ordered or intended to be done prior to visit (i.e. 6 mos follow-up), do we have results/consult notes or has patient scheduled?        •  Labs - pt cancelled her appt  Note: If patient appointment is for lab review and patient did not complete labs, check with provider if OK to reschedule patient until labs completed.       •  Imaging - Imaging was not ordered at last office visit.       •  Referrals - No referrals were ordered at last office visit.    3.  Immunizations were updated in Epic using Reconcile Outside Information activity? Yes       •  Is patient due for Tdap? NO       •  Is patient due for Shingrix? YES. Patient was not notified of copay/out of pocket cost.    4.  Patient is due for the following Health Maintenance Topics:   Health Maintenance Due   Topic Date Due   • COVID-19 Vaccine (1) Never done   • IMM ZOSTER VACCINES (1 of 2) Never done   • RETINAL SCREENING  10/13/2015   • MAMMOGRAM  06/29/2016     5.  Reviewed/Updated the following with patient:       •   Preferred Pharmacy? No       •   Preferred Lab? No       •   Preferred Communication? No       •   Allergies? No       •   Medications? NO       •   Social History? No       •   Family History (document living status of immediate family members and if + hx of  cancer, diabetes, hypertension, hyperlipidemia, heart attack, stroke) No    6.  Care Team Updated:       •   DME Company (gait device, O2, CPAP, etc.): N\A       •   Other Specialists (eye doctor, derm, GYN, cardiology, endo, etc): N\A    7.  Patient was not advised: “This is a free wellness visit. The provider will screen for medical conditions to help you stay healthy. If you have other concerns to address you  may be asked to discuss these at a separate visit or there may be an additional fee.”     8.  AHA (Puls[employer]) form printed for Provider? N/A   Left message for patient to call back regarding pre-visit planning. Please transfer call to [phone].  Second mess left 07/01/21 07/02/21 @ 11:25  Patient cancelled her appointment

## 2021-07-06 ENCOUNTER — DOCUMENTATION (OUTPATIENT)
Dept: VASCULAR LAB | Facility: MEDICAL CENTER | Age: 66
End: 2021-07-06

## 2021-07-06 NOTE — PROGRESS NOTES
Spoke to Brenda.  She recently changed her insurance from [employer] to [employer].  Unfortunately, we are not contracted with this insurance.  We will not be able to assist her with her diabetes management at this point.  We will contact her referring provider.    CC  FILI Kaminski, PharmD, MS, BCACP, [facility] of Heart and Vascular Health  Phone: [phone],  Fax: [phone]  On call: [phone]    This note was created using voice recognition software (Dragon). The accuracy of the dictation is limited by the abilities of the software. I have reviewed the note prior to signing, however some errors in grammar and context are still possible. If you have any questions related to this note please do not hesitate to contact our office.

## 2021-07-06 NOTE — Clinical Note
Genoveva,  We received your referral for diabetes management.  Unfortunately, we are not contracted with her insurance.  We will not be able to see her for diabetes management at [facility] with [employer].  We did contact the patient.  Thanks  Eliu Dent, PharmD, MS, BCACP, [facility] of Heart and Vascular Health  Phone: [phone],  Fax: [phone]  On call: [phone]    This note was created using voice recognition software (Dragon). The accuracy of the dictation is limited by the abilities of the software. I have reviewed the note prior to signing, however some errors in grammar and context are still possible. If you have any questions related to this note please do not hesitate to contact our office.

## 2021-08-18 DIAGNOSIS — E11.9 TYPE 2 DIABETES MELLITUS WITHOUT COMPLICATION, WITHOUT LONG-TERM CURRENT USE OF INSULIN (HCC): ICD-10-CM

## 2021-08-18 DIAGNOSIS — E78.5 DYSLIPIDEMIA: ICD-10-CM

## 2021-08-18 DIAGNOSIS — N18.30 TYPE 2 DIABETES MELLITUS WITH STAGE 3 CHRONIC KIDNEY DISEASE, WITHOUT LONG-TERM CURRENT USE OF INSULIN (HCC): ICD-10-CM

## 2021-08-18 DIAGNOSIS — E11.22 TYPE 2 DIABETES MELLITUS WITH STAGE 3 CHRONIC KIDNEY DISEASE, WITHOUT LONG-TERM CURRENT USE OF INSULIN (HCC): ICD-10-CM

## 2021-08-19 RX ORDER — ATORVASTATIN CALCIUM 40 MG/1
40 TABLET, FILM COATED ORAL
Qty: 100 TABLET | Refills: 0 | Status: SHIPPED | OUTPATIENT
Start: 2021-08-19

## 2021-08-19 RX ORDER — INSULIN GLARGINE 100 [IU]/ML
7 INJECTION, SOLUTION SUBCUTANEOUS EVERY EVENING
Qty: 6 ML | Refills: 0 | Status: SHIPPED | OUTPATIENT
Start: 2021-08-19

## 2021-08-19 NOTE — TELEPHONE ENCOUNTER
Requested Prescriptions     Pending Prescriptions Disp Refills   • insulin glargine (LANTUS SOLOSTAR) 100 UNIT/ML Solution Pen-injector injection 6 mL 0     Sig: Inject 7 Units under the skin every evening.   • metformin (GLUCOPHAGE) 1000 MG tablet 200 Tablet 0     Sig: Take 1 Tablet by mouth 2 times a day with meals.   • atorvastatin (LIPITOR) 40 MG Tab 100 Tablet 0     Sig: Take 1 Tablet by mouth at bedtime.       JEANNE Kaminski.

## 2021-08-27 ENCOUNTER — TELEPHONE (OUTPATIENT)
Dept: HEALTH INFORMATION MANAGEMENT | Facility: OTHER | Age: 66
End: 2021-08-27

## 2021-08-27 NOTE — TELEPHONE ENCOUNTER
Outcome: Left Message    Please transfer to Patient Outreach Team at [phone] when patient returns call.    HealthConnect Verified: yes    Attempt # 1

## 2021-12-11 DIAGNOSIS — I10 ESSENTIAL HYPERTENSION: ICD-10-CM

## 2021-12-14 RX ORDER — AMLODIPINE BESYLATE 5 MG/1
TABLET ORAL
Qty: 30 TABLET | Refills: 0 | OUTPATIENT
Start: 2021-12-14

## 2021-12-15 NOTE — TELEPHONE ENCOUNTER
I called and left a message for the patient with my phone number.  I want to find out if the patient has changed to a new PCP as Genoveva is not listed as PCP and we received a refill request.  The chart is absent the name of a PCP.

## 2022-09-28 ENCOUNTER — APPOINTMENT (OUTPATIENT)
Dept: LAB | Facility: MEDICAL CENTER | Age: 67
End: 2022-09-28

## 2023-04-25 NOTE — PROGRESS NOTES
Subjective:   Brenda Davidson is a 62 y.o. female here today for Left shoulder pain    Acute pain of left shoulder  New problem. Patient is reporting that 3 days ago she woke up and started having pain at the base of the neck on the left side that is radiated into the top part of the shoulder. She describes the pain as achy to sharp in nature; comes and goes; it is painful to mobilize the arm. She denies any numbness or tingling; she denies any arm weakness. She has tried over-the-counter ibuprofen and this is only mildly beneficial.         Current medicines (including changes today)  Current Outpatient Prescriptions   Medication Sig Dispense Refill   • metformin (GLUCOPHAGE) 1000 MG tablet Take 1 Tab by mouth 2 times a day, with meals. 180 Tab 3   • losartan (COZAAR) 100 MG Tab Take 1 Tab by mouth every day. 90 Tab 3   • glyBURIDE (DIABETA) 5 MG Tab TAKE ONE TABLET BY MOUTH EVERY MORNING WITH BREAKFAST(GENERIC DIABETA) 90 Tab 3   • atorvastatin (LIPITOR) 40 MG Tab Take 1 Tab by mouth every evening. 90 Tab 3   • diltiazem CD (CARTIA XT) 180 MG CAPSULE SR 24 HR Take 1 Cap by mouth every day. 90 Cap 3   • CARTIA  MG CAPSULE SR 24 HR TAKE ONE CAPSULE BY MOUTH DAILY 90 Cap 0   • ibuprofen (MOTRIN) 800 MG Tab Take 1 Tab by mouth every 8 hours as needed. 30 Tab 1   • Blood Glucose Monitoring Suppl SUPPLIES MISC Test strips order: Test strips for Accu Check Compact meter. Sig: use once daily before breakfast and prn ssx high or low sugar #150 RF x 3 150 Strip 5     No current facility-administered medications for this visit.      She  has a past medical history of Anesthesia; Chronic low back pain; Cold (3/29/12); Dental disorder; DM (diabetes mellitus) ([facility]); Hypercholesteremia; Hypertension; Mass of cecum (9/15); and Other specified disorder of intestines. She also has no past medical history of Breast cancer (CMS-[facility]).    ROS   No chest pain, no shortness of breath, no abdominal pain  +Left shoulder  "pain     Objective:     Blood pressure 132/82, pulse 79, temperature 37.1 °C (98.8 °F), height 1.702 m (5' 7\"), weight 95.3 kg (210 lb), SpO2 95 %. Body mass index is 32.89 kg/m².   Physical Exam:  Alert, oriented in no acute distress.  Eye contact is good, speech goal directed, affect calm  HEENT: conjunctiva non-injected, sclera non-icteric.  Pinna normal. Oral mucous membranes pink and moist with no lesions.  Neck No adenopathy or masses in the neck or supraclavicular regions.  Lungs: clear to auscultation bilaterally with good excursion.  CV: regular rate and rhythm.  Ext: no edema, color normal, vascularity normal, temperature normal  MSK: Left shoulder-tenderness to palpation at the trapezius and rhomboid; hypertonicity of both muscles; no swelling      Assessment and Plan:   The following treatment plan was discussed     1. Acute pain of left shoulder      Uncontrolled; differential diagnosis includes muscle strain, muscle spasm, idiopathic. Recommended over-the-counter ibuprofen; stretching exercises provided; Heating pad recommended also; monitor        Followup: Return if symptoms worsen or fail to improve.            " rolling walker

## 2023-05-12 ENCOUNTER — HOSPITAL ENCOUNTER (OUTPATIENT)
Facility: MEDICAL CENTER | Age: 68
End: 2023-05-12
Attending: PHYSICIAN ASSISTANT
Payer: COMMERCIAL

## 2023-05-13 LAB
FORWARD REASON: SPWHY: NORMAL
FORWARDED TO LAB: SPWHR: NORMAL
SPECIMEN SENT: SPWT1: NORMAL

## 2023-06-29 ENCOUNTER — HOSPITAL ENCOUNTER (OUTPATIENT)
Facility: MEDICAL CENTER | Age: 68
End: 2023-06-29
Attending: UROLOGY
Payer: COMMERCIAL

## 2023-06-29 LAB
FORWARD REASON: SPWHY: NORMAL
FORWARDED TO LAB: SPWHR: NORMAL
SPECIMEN SENT: SPWT1: NORMAL

## 2023-10-04 NOTE — TELEPHONE ENCOUNTER
Normal function and programming of loop recorder.  Implanted for AF management  There were no new observations noted.   Battery status is ok.  Presenting rhythm AF?      - 1 symptom episode, 10/3 at 947 PM - livewell message to inquire regarding symptoms.     On Toprol (25 mg daily) & Eliquis.                              Phone Number Called: [phone] (home)     In accordance with instructions from Genoveva Roach to  have the patient contact Dr. Saldaña to discuss if spironolactone should be refilled, I called the patient and left a message with my direct phone number, requesting that she call me.  I also sent a message to the patient on [employer] reiterating Genoveva's instructions.

## 2023-12-12 ENCOUNTER — HOSPITAL ENCOUNTER (OUTPATIENT)
Facility: MEDICAL CENTER | Age: 68
End: 2023-12-12
Attending: PHYSICIAN ASSISTANT
Payer: COMMERCIAL

## 2023-12-12 LAB
FORWARD REASON: SPWHY: NORMAL
FORWARDED TO LAB: SPWHR: NORMAL
SPECIMEN SENT: SPWT1: NORMAL

## 2024-01-09 NOTE — CARE PLAN
Problem: Communication  Goal: The ability to communicate needs accurately and effectively will improve  Outcome: PROGRESSING AS EXPECTED     Problem: Safety  Goal: Will remain free from injury  Outcome: PROGRESSING AS EXPECTED     Problem: Safety  Goal: Will remain free from falls  Outcome: PROGRESSING AS EXPECTED     Problem: Pain Management  Goal: Pain level will decrease to patient's comfort goal  Outcome: PROGRESSING AS EXPECTED      Speaking Coherently

## 2024-12-02 ENCOUNTER — HOSPITAL ENCOUNTER (OUTPATIENT)
Facility: MEDICAL CENTER | Age: 69
End: 2024-12-02
Attending: PODIATRIST | Admitting: PODIATRIST
Payer: MEDICARE